# Patient Record
Sex: MALE | Race: WHITE | NOT HISPANIC OR LATINO | Employment: OTHER | URBAN - METROPOLITAN AREA
[De-identification: names, ages, dates, MRNs, and addresses within clinical notes are randomized per-mention and may not be internally consistent; named-entity substitution may affect disease eponyms.]

---

## 2017-08-04 ENCOUNTER — HISTORICAL (OUTPATIENT)
Dept: RADIOLOGY | Facility: HOSPITAL | Age: 77
End: 2017-08-04

## 2017-08-04 LAB — PSA SERPL-MCNC: 5.32 NG/ML (ref 0–4)

## 2018-04-17 ENCOUNTER — HISTORICAL (OUTPATIENT)
Dept: LAB | Facility: HOSPITAL | Age: 78
End: 2018-04-17

## 2018-04-17 LAB
ABS NEUT (OLG): 2.9
ALBUMIN SERPL-MCNC: 3.5 GM/DL (ref 3.4–5)
ALBUMIN/GLOB SERPL: 1 RATIO (ref 1.1–2)
ALP SERPL-CCNC: 124 UNIT/L (ref 46–116)
ALT SERPL-CCNC: 29 UNIT/L (ref 12–78)
AST SERPL-CCNC: 45 UNIT/L (ref 10–37)
BASOPHILS # BLD AUTO: 0.03 X10(3)/MCL
BASOPHILS NFR BLD AUTO: 0.7 %
BILIRUB SERPL-MCNC: 0.6 MG/DL (ref 0.2–1)
BILIRUBIN DIRECT+TOT PNL SERPL-MCNC: 0.15 MG/DL (ref 0–0.2)
BILIRUBIN DIRECT+TOT PNL SERPL-MCNC: 0.45 MG/DL
BUN SERPL-MCNC: 23 MG/DL (ref 7–18)
CALCIUM SERPL-MCNC: 9.4 MG/DL (ref 8.5–10.1)
CHLORIDE SERPL-SCNC: 102 MMOL/L (ref 98–107)
CHOLEST SERPL-MCNC: 183 MG/DL (ref 50–200)
CHOLEST/HDLC SERPL: 2 {RATIO} (ref 0–5)
CO2 SERPL-SCNC: 27.5 MMOL/L (ref 21–32)
CREAT SERPL-MCNC: 0.98 MG/DL (ref 0.7–1.3)
EOSINOPHIL # BLD AUTO: 0.16 X10(3)/MCL
EOSINOPHIL NFR BLD AUTO: 3.5 %
ERYTHROCYTE [DISTWIDTH] IN BLOOD BY AUTOMATED COUNT: 13 %
GLOBULIN SER-MCNC: 3.6 GM/DL (ref 2.4–3.5)
GLUCOSE SERPL-MCNC: 101 MG/DL (ref 74–106)
HCT VFR BLD AUTO: 36.6 % (ref 39–49)
HDLC SERPL-MCNC: 81 MG/DL (ref 35–60)
HGB BLD-MCNC: 11.9 GM/DL (ref 12.6–16.6)
IMM GRANULOCYTES # BLD AUTO: 0.01 10*3/UL (ref 0–0.1)
IMM GRANULOCYTES NFR BLD AUTO: 0.2 % (ref 0–1)
LDLC SERPL CALC-MCNC: 93 MG/DL (ref 50–140)
LYMPHOCYTES # BLD AUTO: 0.99 X10(3)/MCL
LYMPHOCYTES NFR BLD AUTO: 21.8 %
MCH RBC QN AUTO: 29.2 PG (ref 27–33)
MCHC RBC AUTO-ENTMCNC: 32.5 GM/DL (ref 32–35)
MCV RBC AUTO: 89.7 FL (ref 84–97)
MONOCYTES # BLD AUTO: 0.46 X10(3)/MCL
MONOCYTES NFR BLD AUTO: 10.1 %
NEUTROPHILS # BLD AUTO: 2.9 X10(3)/MCL
NEUTROPHILS NFR BLD AUTO: 63.7 %
PLATELET # BLD AUTO: 231 X10(3)/MCL (ref 151–368)
PMV BLD AUTO: 9 FL
POTASSIUM SERPL-SCNC: 4.2 MMOL/L (ref 3.5–5.1)
PROT SERPL-MCNC: 7.1 GM/DL (ref 6.4–8.2)
RBC # BLD AUTO: 4.08 X10(6)/MCL (ref 4.3–5.6)
SODIUM SERPL-SCNC: 138 MMOL/L (ref 136–145)
TRIGL SERPL-MCNC: 45 MG/DL (ref 30–150)
VLDLC SERPL CALC-MCNC: 9 MG/DL
WBC # SPEC AUTO: 4.55 X10(3)/MCL (ref 3.4–9.2)

## 2018-05-17 ENCOUNTER — HISTORICAL (OUTPATIENT)
Dept: RESPIRATORY THERAPY | Facility: HOSPITAL | Age: 78
End: 2018-05-17

## 2018-08-20 ENCOUNTER — HISTORICAL (OUTPATIENT)
Dept: LAB | Facility: HOSPITAL | Age: 78
End: 2018-08-20

## 2018-08-20 LAB
ALBUMIN SERPL-MCNC: 3.6 GM/DL (ref 3.4–5)
ALP SERPL-CCNC: 113 UNIT/L (ref 46–116)
ALT SERPL-CCNC: 29 UNIT/L (ref 12–78)
AST SERPL-CCNC: 36 UNIT/L (ref 10–37)
BILIRUB SERPL-MCNC: 0.6 MG/DL (ref 0.2–1)
BILIRUBIN DIRECT+TOT PNL SERPL-MCNC: 0.13 MG/DL (ref 0–0.2)
BILIRUBIN DIRECT+TOT PNL SERPL-MCNC: 0.47 MG/DL
PROT SERPL-MCNC: 6.9 GM/DL (ref 6.4–8.2)

## 2019-07-02 ENCOUNTER — HISTORICAL (OUTPATIENT)
Dept: LAB | Facility: HOSPITAL | Age: 79
End: 2019-07-02

## 2019-07-02 LAB
ALBUMIN SERPL-MCNC: 3.9 GM/DL (ref 3.4–5)
BUN SERPL-MCNC: 26 MG/DL (ref 7–18)
CALCIUM SERPL-MCNC: 9.5 MG/DL (ref 8.5–10.1)
CHLORIDE SERPL-SCNC: 104 MMOL/L (ref 98–107)
CO2 SERPL-SCNC: 28 MMOL/L (ref 21–32)
CREAT SERPL-MCNC: 0.92 MG/DL (ref 0.7–1.3)
GLUCOSE SERPL-MCNC: 89 MG/DL (ref 74–106)
PHOSPHATE SERPL-MCNC: 3.9 MG/DL (ref 2.5–4.9)
POTASSIUM SERPL-SCNC: 4.5 MMOL/L (ref 3.5–5.1)
SODIUM SERPL-SCNC: 138 MMOL/L (ref 136–145)

## 2021-04-23 ENCOUNTER — HISTORICAL (OUTPATIENT)
Dept: WOUND CARE | Facility: HOSPITAL | Age: 81
End: 2021-04-23

## 2021-06-01 ENCOUNTER — HISTORICAL (OUTPATIENT)
Dept: LAB | Facility: HOSPITAL | Age: 81
End: 2021-06-01

## 2021-06-01 LAB
ALBUMIN SERPL-MCNC: 3.4 GM/DL (ref 3.4–4.8)
ALBUMIN/GLOB SERPL: 0.9 RATIO (ref 1.1–2)
ALP SERPL-CCNC: 190 UNIT/L (ref 40–150)
ALT SERPL-CCNC: 17 UNIT/L (ref 0–55)
AST SERPL-CCNC: 24 UNIT/L (ref 5–34)
BILIRUB SERPL-MCNC: 0.5 MG/DL
BILIRUBIN DIRECT+TOT PNL SERPL-MCNC: 0.2 MG/DL
BILIRUBIN DIRECT+TOT PNL SERPL-MCNC: 0.3 MG/DL (ref 0–0.5)
BUN SERPL-MCNC: 17 MG/DL (ref 8.4–25.7)
CALCIUM SERPL-MCNC: 9.3 MG/DL (ref 8.8–10)
CHLORIDE SERPL-SCNC: 101 MMOL/L (ref 98–107)
CHOLEST SERPL-MCNC: 147 MG/DL
CHOLEST/HDLC SERPL: 2 {RATIO} (ref 0–5)
CO2 SERPL-SCNC: 28 MEQ/L (ref 23–31)
CREAT SERPL-MCNC: 0.83 MG/DL (ref 0.73–1.18)
GLOBULIN SER-MCNC: 3.6 GM/DL (ref 2.4–3.5)
GLUCOSE SERPL-MCNC: 101 MG/DL (ref 82–115)
HDLC SERPL-MCNC: 61 MG/DL (ref 35–60)
LDLC SERPL CALC-MCNC: 77 MG/DL (ref 50–140)
POTASSIUM SERPL-SCNC: 4.2 MMOL/L (ref 3.5–5.1)
PROT SERPL-MCNC: 7 GM/DL (ref 5.8–7.6)
SODIUM SERPL-SCNC: 137 MMOL/L (ref 136–145)
TRIGL SERPL-MCNC: 46 MG/DL (ref 34–140)
VLDLC SERPL CALC-MCNC: 9 MG/DL

## 2021-07-14 ENCOUNTER — HISTORICAL (OUTPATIENT)
Dept: LAB | Facility: HOSPITAL | Age: 81
End: 2021-07-14

## 2021-07-14 LAB
ALBUMIN SERPL-MCNC: 3.6 GM/DL (ref 3.4–4.8)
ALBUMIN/GLOB SERPL: 1 RATIO (ref 1.1–2)
ALP SERPL-CCNC: 157 UNIT/L (ref 40–150)
ALT SERPL-CCNC: 23 UNIT/L (ref 0–55)
AST SERPL-CCNC: 31 UNIT/L (ref 5–34)
BILIRUB SERPL-MCNC: 0.5 MG/DL
BILIRUBIN DIRECT+TOT PNL SERPL-MCNC: 0.2 MG/DL (ref 0–0.5)
BILIRUBIN DIRECT+TOT PNL SERPL-MCNC: 0.3 MG/DL
BUN SERPL-MCNC: 21 MG/DL (ref 8.4–25.7)
CALCIUM SERPL-MCNC: 9.6 MG/DL (ref 8.8–10)
CHLORIDE SERPL-SCNC: 99 MMOL/L (ref 98–107)
CHOLEST SERPL-MCNC: 185 MG/DL
CHOLEST/HDLC SERPL: 3 {RATIO} (ref 0–5)
CO2 SERPL-SCNC: 30 MEQ/L (ref 23–31)
CREAT SERPL-MCNC: 0.9 MG/DL (ref 0.73–1.18)
GLOBULIN SER-MCNC: 3.6 GM/DL (ref 2.4–3.5)
GLUCOSE SERPL-MCNC: 99 MG/DL (ref 82–115)
HDLC SERPL-MCNC: 67 MG/DL (ref 35–60)
LDLC SERPL CALC-MCNC: 108 MG/DL (ref 50–140)
POTASSIUM SERPL-SCNC: 5.2 MMOL/L (ref 3.5–5.1)
PROT SERPL-MCNC: 7.2 GM/DL (ref 5.8–7.6)
SODIUM SERPL-SCNC: 136 MMOL/L (ref 136–145)
T4 SERPL-MCNC: 9.42 UG/DL (ref 4.87–11.72)
TRIGL SERPL-MCNC: 52 MG/DL (ref 34–140)
TSH SERPL-ACNC: 6.77 UIU/ML (ref 0.35–4.94)
VLDLC SERPL CALC-MCNC: 10 MG/DL

## 2021-07-26 ENCOUNTER — HISTORICAL (OUTPATIENT)
Dept: LAB | Facility: HOSPITAL | Age: 81
End: 2021-07-26

## 2021-07-26 LAB — POTASSIUM SERPL-SCNC: 4.1 MMOL/L (ref 3.5–5.1)

## 2021-08-27 ENCOUNTER — HISTORICAL (OUTPATIENT)
Dept: LAB | Facility: HOSPITAL | Age: 81
End: 2021-08-27

## 2021-08-27 LAB
T4 SERPL-MCNC: 9.93 UG/DL (ref 4.87–11.72)
TSH SERPL-ACNC: 3.7 UIU/ML (ref 0.35–4.94)

## 2022-03-02 ENCOUNTER — HISTORICAL (OUTPATIENT)
Dept: LAB | Facility: HOSPITAL | Age: 82
End: 2022-03-02

## 2022-03-02 LAB
ALBUMIN SERPL-MCNC: 3.6 G/DL (ref 3.4–4.8)
ALBUMIN/GLOB SERPL: 1 {RATIO} (ref 1.1–2)
ALP SERPL-CCNC: 128 U/L (ref 40–150)
ALT SERPL-CCNC: 16 U/L (ref 0–55)
AST SERPL-CCNC: 29 U/L (ref 5–34)
BILIRUB SERPL-MCNC: 0.6 MG/DL
BILIRUBIN DIRECT+TOT PNL SERPL-MCNC: 0.3
BILIRUBIN DIRECT+TOT PNL SERPL-MCNC: 0.3 (ref 0–0.5)
BUN SERPL-MCNC: 19 MG/DL (ref 8.4–25.7)
CALCIUM SERPL-MCNC: 9.7 MG/DL (ref 8.7–10.5)
CHLORIDE SERPL-SCNC: 101 MMOL/L (ref 98–107)
CHOLEST SERPL-MCNC: 172 MG/DL
CHOLEST/HDLC SERPL: 3 {RATIO} (ref 0–5)
CO2 SERPL-SCNC: 29 MMOL/L (ref 23–31)
CREAT SERPL-MCNC: 0.82 MG/DL (ref 0.73–1.18)
GLOBULIN SER-MCNC: 3.7 G/DL (ref 2.4–3.5)
GLUCOSE SERPL-MCNC: 95 MG/DL (ref 82–115)
HDLC SERPL-MCNC: 65 MG/DL (ref 35–60)
HEMOLYSIS INTERF INDEX SERPL-ACNC: 3
ICTERIC INTERF INDEX SERPL-ACNC: 1
LDLC SERPL CALC-MCNC: 96 MG/DL (ref 50–140)
LIPEMIC INTERF INDEX SERPL-ACNC: -1
POTASSIUM SERPL-SCNC: 4.3 MMOL/L (ref 3.5–5.1)
PROT SERPL-MCNC: 7.3 G/DL (ref 5.8–7.6)
SODIUM SERPL-SCNC: 138 MMOL/L (ref 136–145)
T4 SERPL-MCNC: 11.1 UG/DL (ref 4.87–11.72)
TRIGL SERPL-MCNC: 55 MG/DL (ref 34–140)
TSH SERPL-ACNC: 3.09 M[IU]/L (ref 0.35–4.94)
VLDLC SERPL CALC-MCNC: 11 MG/DL

## 2022-07-06 ENCOUNTER — LAB VISIT (OUTPATIENT)
Dept: LAB | Facility: HOSPITAL | Age: 82
End: 2022-07-06
Attending: FAMILY MEDICINE
Payer: MEDICARE

## 2022-07-06 DIAGNOSIS — E03.9 HYPOTHYROIDISM, ADULT: Primary | ICD-10-CM

## 2022-07-06 LAB — T4 FREE SERPL-MCNC: 1.09 NG/DL (ref 0.7–1.48)

## 2022-07-06 PROCEDURE — 36415 COLL VENOUS BLD VENIPUNCTURE: CPT

## 2022-07-06 PROCEDURE — 84439 ASSAY OF FREE THYROXINE: CPT

## 2022-10-04 ENCOUNTER — LAB VISIT (OUTPATIENT)
Dept: LAB | Facility: HOSPITAL | Age: 82
End: 2022-10-04
Attending: INTERNAL MEDICINE
Payer: MEDICARE

## 2022-10-04 DIAGNOSIS — E78.5 HYPERLIPIDEMIA, UNSPECIFIED HYPERLIPIDEMIA TYPE: ICD-10-CM

## 2022-10-04 DIAGNOSIS — I10 ESSENTIAL HYPERTENSION, MALIGNANT: ICD-10-CM

## 2022-10-04 DIAGNOSIS — I25.10 CORONARY ATHEROSCLEROSIS OF NATIVE CORONARY ARTERY: Primary | ICD-10-CM

## 2022-10-04 LAB
ALBUMIN SERPL-MCNC: 3.8 GM/DL (ref 3.4–4.8)
ALBUMIN/GLOB SERPL: 1 RATIO (ref 1.1–2)
ALP SERPL-CCNC: 121 UNIT/L (ref 40–150)
ALT SERPL-CCNC: 18 UNIT/L (ref 0–55)
AST SERPL-CCNC: 34 UNIT/L (ref 5–34)
BILIRUBIN DIRECT+TOT PNL SERPL-MCNC: 0.7 MG/DL
BUN SERPL-MCNC: 17 MG/DL (ref 8.4–25.7)
CALCIUM SERPL-MCNC: 10 MG/DL (ref 8.8–10)
CHLORIDE SERPL-SCNC: 103 MMOL/L (ref 98–107)
CHOLEST SERPL-MCNC: 162 MG/DL
CHOLEST/HDLC SERPL: 3 {RATIO} (ref 0–5)
CO2 SERPL-SCNC: 29 MMOL/L (ref 23–31)
CREAT SERPL-MCNC: 1.07 MG/DL (ref 0.73–1.18)
GFR SERPLBLD CREATININE-BSD FMLA CKD-EPI: >60 MLS/MIN/1.73/M2
GLOBULIN SER-MCNC: 3.8 GM/DL (ref 2.4–3.5)
GLUCOSE SERPL-MCNC: 105 MG/DL (ref 82–115)
HDLC SERPL-MCNC: 56 MG/DL (ref 35–60)
LDLC SERPL CALC-MCNC: 65 MG/DL (ref 50–140)
POTASSIUM SERPL-SCNC: 5 MMOL/L (ref 3.5–5.1)
PROT SERPL-MCNC: 7.6 GM/DL (ref 5.8–7.6)
SODIUM SERPL-SCNC: 141 MMOL/L (ref 136–145)
T4 SERPL-MCNC: 9.63 UG/DL (ref 4.87–11.72)
TRIGL SERPL-MCNC: 204 MG/DL (ref 34–140)
TSH SERPL-ACNC: 3.68 UIU/ML (ref 0.35–4.94)
VLDLC SERPL CALC-MCNC: 41 MG/DL

## 2022-10-04 PROCEDURE — 36415 COLL VENOUS BLD VENIPUNCTURE: CPT

## 2022-10-04 PROCEDURE — 80053 COMPREHEN METABOLIC PANEL: CPT

## 2022-10-04 PROCEDURE — 84436 ASSAY OF TOTAL THYROXINE: CPT

## 2022-10-04 PROCEDURE — 80061 LIPID PANEL: CPT

## 2022-10-04 PROCEDURE — 84443 ASSAY THYROID STIM HORMONE: CPT

## 2023-09-11 ENCOUNTER — LAB VISIT (OUTPATIENT)
Dept: LAB | Facility: HOSPITAL | Age: 83
End: 2023-09-11
Attending: FAMILY MEDICINE
Payer: MEDICARE

## 2023-09-11 DIAGNOSIS — R00.1 SEVERE SINUS BRADYCARDIA: ICD-10-CM

## 2023-09-11 DIAGNOSIS — R42 DIZZINESS AND GIDDINESS: Primary | ICD-10-CM

## 2023-09-11 DIAGNOSIS — Z00.00 ROUTINE GENERAL MEDICAL EXAMINATION AT A HEALTH CARE FACILITY: ICD-10-CM

## 2023-09-11 DIAGNOSIS — E78.5 HYPERLIPIDEMIA, UNSPECIFIED HYPERLIPIDEMIA TYPE: ICD-10-CM

## 2023-09-11 DIAGNOSIS — I25.10 CORONARY ATHEROSCLEROSIS OF NATIVE CORONARY ARTERY: ICD-10-CM

## 2023-09-11 LAB
ALBUMIN SERPL-MCNC: 3.6 G/DL (ref 3.4–4.8)
ALBUMIN/GLOB SERPL: 1 RATIO (ref 1.1–2)
ALP SERPL-CCNC: 122 UNIT/L (ref 40–150)
ALT SERPL-CCNC: 13 UNIT/L (ref 0–55)
APPEARANCE UR: CLEAR
AST SERPL-CCNC: 28 UNIT/L (ref 5–34)
BACTERIA #/AREA URNS AUTO: NORMAL /HPF
BASOPHILS # BLD AUTO: 0.03 X10(3)/MCL
BASOPHILS NFR BLD AUTO: 0.8 %
BILIRUB SERPL-MCNC: 0.7 MG/DL
BILIRUB UR QL STRIP.AUTO: NEGATIVE
BUN SERPL-MCNC: 15 MG/DL (ref 8.4–25.7)
CALCIUM SERPL-MCNC: 9.7 MG/DL (ref 8.8–10)
CHLORIDE SERPL-SCNC: 104 MMOL/L (ref 98–107)
CHOLEST SERPL-MCNC: 169 MG/DL
CHOLEST/HDLC SERPL: 3 {RATIO} (ref 0–5)
CO2 SERPL-SCNC: 24 MMOL/L (ref 23–31)
COLOR UR: YELLOW
CREAT SERPL-MCNC: 0.88 MG/DL (ref 0.73–1.18)
EOSINOPHIL # BLD AUTO: 0.08 X10(3)/MCL (ref 0–0.9)
EOSINOPHIL NFR BLD AUTO: 2.1 %
ERYTHROCYTE [DISTWIDTH] IN BLOOD BY AUTOMATED COUNT: 13.5 % (ref 11.5–17)
GFR SERPLBLD CREATININE-BSD FMLA CKD-EPI: >60 MLS/MIN/1.73/M2
GLOBULIN SER-MCNC: 3.5 GM/DL (ref 2.4–3.5)
GLUCOSE SERPL-MCNC: 109 MG/DL (ref 82–115)
GLUCOSE UR QL STRIP.AUTO: NEGATIVE
HCT VFR BLD AUTO: 39.7 % (ref 42–52)
HDLC SERPL-MCNC: 59 MG/DL (ref 35–60)
HGB BLD-MCNC: 12.8 G/DL (ref 14–18)
IMM GRANULOCYTES # BLD AUTO: 0 X10(3)/MCL (ref 0–0.04)
IMM GRANULOCYTES NFR BLD AUTO: 0 %
KETONES UR QL STRIP.AUTO: NEGATIVE
LDLC SERPL CALC-MCNC: 96 MG/DL (ref 50–140)
LEUKOCYTE ESTERASE UR QL STRIP.AUTO: NEGATIVE
LYMPHOCYTES # BLD AUTO: 1.07 X10(3)/MCL (ref 0.6–4.6)
LYMPHOCYTES NFR BLD AUTO: 27.6 %
MCH RBC QN AUTO: 29.3 PG (ref 27–31)
MCHC RBC AUTO-ENTMCNC: 32.2 G/DL (ref 33–36)
MCV RBC AUTO: 90.8 FL (ref 80–94)
MONOCYTES # BLD AUTO: 0.32 X10(3)/MCL (ref 0.1–1.3)
MONOCYTES NFR BLD AUTO: 8.2 %
NEUTROPHILS # BLD AUTO: 2.38 X10(3)/MCL (ref 2.1–9.2)
NEUTROPHILS NFR BLD AUTO: 61.3 %
NITRITE UR QL STRIP.AUTO: NEGATIVE
NRBC BLD AUTO-RTO: 0 %
PH UR STRIP.AUTO: 6.5 [PH]
PLATELET # BLD AUTO: 191 X10(3)/MCL (ref 130–400)
PMV BLD AUTO: 9.7 FL (ref 7.4–10.4)
POTASSIUM SERPL-SCNC: 4.3 MMOL/L (ref 3.5–5.1)
PROT SERPL-MCNC: 7.1 GM/DL (ref 5.8–7.6)
PROT UR QL STRIP.AUTO: NEGATIVE
RBC # BLD AUTO: 4.37 X10(6)/MCL (ref 4.7–6.1)
RBC #/AREA URNS AUTO: NORMAL /HPF
RBC UR QL AUTO: ABNORMAL
SODIUM SERPL-SCNC: 138 MMOL/L (ref 136–145)
SP GR UR STRIP.AUTO: 1.01 (ref 1–1.03)
SQUAMOUS #/AREA URNS AUTO: NORMAL /HPF
T4 FREE SERPL-MCNC: 1.16 NG/DL (ref 0.7–1.48)
TRIGL SERPL-MCNC: 72 MG/DL (ref 34–140)
TSH SERPL-ACNC: 3.15 UIU/ML (ref 0.35–4.94)
UROBILINOGEN UR STRIP-ACNC: 0.2
VLDLC SERPL CALC-MCNC: 14 MG/DL
WBC # SPEC AUTO: 3.88 X10(3)/MCL (ref 4.5–11.5)
WBC #/AREA URNS AUTO: NORMAL /HPF

## 2023-09-11 PROCEDURE — 81001 URINALYSIS AUTO W/SCOPE: CPT

## 2023-09-11 PROCEDURE — 84439 ASSAY OF FREE THYROXINE: CPT

## 2023-09-11 PROCEDURE — 36415 COLL VENOUS BLD VENIPUNCTURE: CPT

## 2023-09-11 PROCEDURE — 85025 COMPLETE CBC W/AUTO DIFF WBC: CPT

## 2023-09-11 PROCEDURE — 84443 ASSAY THYROID STIM HORMONE: CPT

## 2023-09-11 PROCEDURE — 80053 COMPREHEN METABOLIC PANEL: CPT

## 2023-09-11 PROCEDURE — 80061 LIPID PANEL: CPT

## 2024-03-12 ENCOUNTER — LAB VISIT (OUTPATIENT)
Dept: LAB | Facility: HOSPITAL | Age: 84
End: 2024-03-12
Attending: INTERNAL MEDICINE
Payer: MEDICARE

## 2024-03-12 DIAGNOSIS — I10 HYPERTENSION, UNSPECIFIED TYPE: ICD-10-CM

## 2024-03-12 DIAGNOSIS — I51.9 HEART DISEASE, UNSPECIFIED: Primary | ICD-10-CM

## 2024-03-12 LAB
ALBUMIN SERPL-MCNC: 3.8 G/DL (ref 3.4–4.8)
ALBUMIN/GLOB SERPL: 1.1 RATIO (ref 1.1–2)
ALP SERPL-CCNC: 127 UNIT/L (ref 40–150)
ALT SERPL-CCNC: 13 UNIT/L (ref 0–55)
AST SERPL-CCNC: 28 UNIT/L (ref 5–34)
BILIRUB SERPL-MCNC: 0.5 MG/DL
BUN SERPL-MCNC: 21 MG/DL (ref 8.4–25.7)
CALCIUM SERPL-MCNC: 9.6 MG/DL (ref 8.8–10)
CHLORIDE SERPL-SCNC: 100 MMOL/L (ref 98–107)
CHOLEST SERPL-MCNC: 136 MG/DL
CHOLEST/HDLC SERPL: 2 {RATIO} (ref 0–5)
CO2 SERPL-SCNC: 27 MMOL/L (ref 23–31)
CREAT SERPL-MCNC: 0.82 MG/DL (ref 0.73–1.18)
GFR SERPLBLD CREATININE-BSD FMLA CKD-EPI: >60 MLS/MIN/1.73/M2
GLOBULIN SER-MCNC: 3.6 GM/DL (ref 2.4–3.5)
GLUCOSE SERPL-MCNC: 105 MG/DL (ref 82–115)
HDLC SERPL-MCNC: 55 MG/DL (ref 35–60)
LDLC SERPL CALC-MCNC: 73 MG/DL (ref 50–140)
POTASSIUM SERPL-SCNC: 4.2 MMOL/L (ref 3.5–5.1)
PROT SERPL-MCNC: 7.4 GM/DL (ref 5.8–7.6)
SODIUM SERPL-SCNC: 136 MMOL/L (ref 136–145)
T4 SERPL-MCNC: 9.02 UG/DL (ref 4.87–11.72)
TRIGL SERPL-MCNC: 42 MG/DL (ref 34–140)
TSH SERPL-ACNC: 4.18 UIU/ML (ref 0.35–4.94)
VLDLC SERPL CALC-MCNC: 8 MG/DL

## 2024-03-12 PROCEDURE — 84436 ASSAY OF TOTAL THYROXINE: CPT

## 2024-03-12 PROCEDURE — 84443 ASSAY THYROID STIM HORMONE: CPT

## 2024-03-12 PROCEDURE — 80053 COMPREHEN METABOLIC PANEL: CPT

## 2024-03-12 PROCEDURE — 80061 LIPID PANEL: CPT

## 2024-03-12 PROCEDURE — 36415 COLL VENOUS BLD VENIPUNCTURE: CPT

## 2024-06-18 ENCOUNTER — LAB VISIT (OUTPATIENT)
Dept: LAB | Facility: HOSPITAL | Age: 84
End: 2024-06-18
Attending: INTERNAL MEDICINE
Payer: MEDICARE

## 2024-06-18 DIAGNOSIS — I51.9 HEART DISEASE, UNSPECIFIED: Primary | ICD-10-CM

## 2024-06-18 LAB
ANION GAP SERPL CALC-SCNC: 8 MEQ/L
BUN SERPL-MCNC: 22 MG/DL (ref 8.4–25.7)
CALCIUM SERPL-MCNC: 9.6 MG/DL (ref 8.8–10)
CHLORIDE SERPL-SCNC: 102 MMOL/L (ref 98–107)
CO2 SERPL-SCNC: 26 MMOL/L (ref 23–31)
CREAT SERPL-MCNC: 0.82 MG/DL (ref 0.73–1.18)
CREAT/UREA NIT SERPL: 27
GFR SERPLBLD CREATININE-BSD FMLA CKD-EPI: >60 ML/MIN/1.73/M2
GLUCOSE SERPL-MCNC: 104 MG/DL (ref 82–115)
POTASSIUM SERPL-SCNC: 4.5 MMOL/L (ref 3.5–5.1)
SODIUM SERPL-SCNC: 136 MMOL/L (ref 136–145)

## 2024-06-18 PROCEDURE — 36415 COLL VENOUS BLD VENIPUNCTURE: CPT

## 2024-06-18 PROCEDURE — 80048 BASIC METABOLIC PNL TOTAL CA: CPT

## 2024-07-10 ENCOUNTER — HOSPITAL ENCOUNTER (OUTPATIENT)
Dept: WOUND CARE | Facility: HOSPITAL | Age: 84
Discharge: HOME | End: 2024-07-10
Attending: NURSE PRACTITIONER
Payer: MEDICARE

## 2024-07-10 VITALS
SYSTOLIC BLOOD PRESSURE: 139 MMHG | BODY MASS INDEX: 33.49 KG/M2 | HEART RATE: 97 BPM | HEIGHT: 76 IN | DIASTOLIC BLOOD PRESSURE: 66 MMHG | WEIGHT: 275 LBS | RESPIRATION RATE: 18 BRPM

## 2024-07-10 DIAGNOSIS — I83.019 VENOUS STASIS ULCERS OF BOTH LOWER EXTREMITIES: Primary | ICD-10-CM

## 2024-07-10 DIAGNOSIS — L97.919 VENOUS STASIS ULCERS OF BOTH LOWER EXTREMITIES: Primary | ICD-10-CM

## 2024-07-10 DIAGNOSIS — L97.929 VENOUS STASIS ULCERS OF BOTH LOWER EXTREMITIES: Primary | ICD-10-CM

## 2024-07-10 DIAGNOSIS — I83.029 VENOUS STASIS ULCERS OF BOTH LOWER EXTREMITIES: Primary | ICD-10-CM

## 2024-07-10 PROCEDURE — 27000999 HC MEDICAL RECORD PHOTO DOCUMENTATION

## 2024-07-10 PROCEDURE — 99203 OFFICE O/P NEW LOW 30 MIN: CPT | Mod: 25

## 2024-07-10 PROCEDURE — 11719 TRIM NAIL(S) ANY NUMBER: CPT

## 2024-07-10 RX ORDER — ROSUVASTATIN CALCIUM 20 MG/1
20 TABLET, COATED ORAL NIGHTLY
COMMUNITY
Start: 2024-05-23

## 2024-07-10 RX ORDER — POTASSIUM CHLORIDE 20 MEQ/1
20 TABLET, EXTENDED RELEASE ORAL
COMMUNITY

## 2024-07-10 RX ORDER — RIVAROXABAN 20 MG/1
20 TABLET, FILM COATED ORAL
COMMUNITY
Start: 2024-06-18

## 2024-07-10 RX ORDER — MUPIROCIN 20 MG/G
OINTMENT TOPICAL
COMMUNITY
Start: 2024-04-16

## 2024-07-10 RX ORDER — CEFPROZIL 500 MG/1
TABLET, FILM COATED ORAL
COMMUNITY
Start: 2024-07-09

## 2024-07-10 RX ORDER — METOPROLOL SUCCINATE 25 MG/1
TABLET, EXTENDED RELEASE ORAL
COMMUNITY
Start: 2024-05-29

## 2024-07-10 RX ORDER — SILVER SULFADIAZINE 10 G/1000G
CREAM TOPICAL
COMMUNITY
Start: 2024-05-02

## 2024-07-10 RX ORDER — TAMSULOSIN HYDROCHLORIDE 0.4 MG/1
0.4 CAPSULE ORAL
COMMUNITY

## 2024-07-10 RX ORDER — FUROSEMIDE 40 MG/1
40 TABLET ORAL
COMMUNITY

## 2024-07-10 NOTE — PROGRESS NOTES
Home Health: None  Smoker   [] Yes   [x] No   Diabetic  [] Yes  [x] No   Vascular Surgeon:   Vascular procedures [] Yes [x] No   If so, when and what was done?  Recent Ultrasounds [x] Yes [] No   If so, when were they done? 6/2024 with Dr. Carter  Compression Pumps  [] Yes [x] No       Right ankle - 25.5 cm     Right calf - 45.5 cm     Left ankle - 26 cm     Left calf - 45.5 cm  Doppler done in office? [x] Yes [] No   Date: 7/10/24     Right dorsalis: monophasic     Right posterior: biphasic     Left dorsalis: biphasic     Left posterior: abnormal  Is the patient eligible for a graft, and/or currently grafting? [] Yes [] No    If so, which one/size?        Subjective:       Patient ID: Piter Farrell is a 84 y.o. male.    Chief Complaint: Venous Ulcer (Right lower leg/Left lower leg)    HPI    7/10/24 - Mr. Farrell is an 84 year old  male who presents with bilateral lower extremity venous ulcers.  His PCP is Dr. Jesus Hernandez, who referred him to wound clinic.    4/16/24: saw PCP for multiple sores on lower extremities, was given Augmentin 875 mg BID and Bumetanide (no culture was obtained).  4/25/24: saw PCP again for bacterial infection of lower extremities, refilled Augmentin 875 mg BID for 5 days (no culture was obtained)  5/2/24: saw PCP for left leg, was given Silvadine  5/28/24: saw PCP, suggested that he use compression stockings and follow up with his cardiologist (Dr. Carter), he did not do either of these things  7/9/24: saw PCP given Lasix, Cefzil and referred to wound care  He has a history of triple bypass and CHF.   He states that he had venous US done on his legs about 1 month ago with Dr. Carter.  These results were reviewed.  No arterial U/S were performed and the study shows that it was incomplete due to patient resistance.   He is not a diabetic.   He states that he was seen in our clinic about 3 years ago for the same issue.    Today his bilateral lower extremities have multiple open areas  that do appear venous in nature.  Because his venous ultrasounds were in conclusive an incomplete we will be ordering full cardiovascular ultrasounds.  He does have 2+ edema as well as hemosiderin staining.  We would like to compress, however, until we have arterial clearing we are hesitant to do so as this has not been addressed.  We did have a lengthy discussion today regarding his congestive heart failure, the need to compress, the impact that compression may have on his CHF due to moving the fluid from his legs to his chest, as well as the need to verify his arterial status prior to compressing.  He is in agreement with all of this and today we will begin covering the open ulcers and applying a Tubigrip over the Kerlix to hold the dressings in place.  He was instructed that he should alternate short elevation of the legs with dependent hanging of the legs so that blood flow can continue to the feet but at the same time tried to begin offloading some of the legs.  He is ambulatory and therefore would be able to wear an Unna boot wound we are at that point.  He does been the majority of his time at a local skilled nursing facility with his handicapped son.   All 10 toe nails today were quite lengthy and were trimmed for the patient.      Review of Systems      Objective:        Vitals:    07/10/24 1138   BP: 139/66   Pulse: 97   Resp: 18     Physical Exam         Wound 07/10/24 1154 Venous Ulcer Right anterior;lower Leg #1 (Active)   07/10/24 1154   Present on Original Admission: Y   Primary Wound Type: Venous ulcer   Side: Right   Orientation: anterior;lower   Location: Leg   Wound Approximate Age at First Assessment (Weeks):    Wound Number: #1   Is this injury device related?:    Incision Type:    Closure Method:    Wound Description (Comments):    Type:    Additional Comments:    Ankle-Brachial Index:    Pulses:    Removal Indication and Assessment:    Wound Outcome:    Dressing Appearance Moist drainage  07/10/24 1154   Drainage Amount Moderate 07/10/24 1154   Drainage Characteristics/Odor Serosanguineous 07/10/24 1154   Appearance Red;Blistered;Moist 07/10/24 1154   Tissue loss description Full thickness 07/10/24 1154   Periwound Area Edematous;Redness;Moist 07/10/24 1154   Wound Edges Callused 07/10/24 1154   Wound Length (cm) 11 cm 07/10/24 1154   Wound Width (cm) 3.5 cm 07/10/24 1154   Wound Depth (cm) 0.2 cm 07/10/24 1154   Wound Volume (cm^3) 7.7 cm^3 07/10/24 1154   Wound Surface Area (cm^2) 38.5 cm^2 07/10/24 1154   Care Cleansed with:;Wound cleanser 07/10/24 1154   Dressing Applied 07/10/24 1154            Wound 07/10/24 1154 Venous Ulcer Left lower Leg #2 (Active)   07/10/24 1154   Present on Original Admission: Y   Primary Wound Type: Venous ulcer   Side: Left   Orientation: lower   Location: Leg   Wound Approximate Age at First Assessment (Weeks):    Wound Number: #2   Is this injury device related?:    Incision Type:    Closure Method:    Wound Description (Comments):    Type:    Additional Comments:    Ankle-Brachial Index:    Pulses:    Removal Indication and Assessment:    Wound Outcome:    Dressing Appearance Moist drainage 07/10/24 1154   Drainage Amount Moderate 07/10/24 1154   Drainage Characteristics/Odor Serosanguineous 07/10/24 1154   Appearance Red;Moist 07/10/24 1154   Tissue loss description Full thickness 07/10/24 1154   Periwound Area Edematous;Redness;Moist 07/10/24 1154   Wound Edges Defined 07/10/24 1154   Wound Length (cm) 11.5 cm 07/10/24 1154   Wound Width (cm) 19 cm 07/10/24 1154   Wound Depth (cm) 0.2 cm 07/10/24 1154   Wound Volume (cm^3) 43.7 cm^3 07/10/24 1154   Wound Surface Area (cm^2) 218.5 cm^2 07/10/24 1154   Care Cleansed with:;Wound cleanser 07/10/24 1154   Dressing Applied 07/10/24 1154            Wound 07/10/24 1155 Venous Ulcer Right Toe, second #3 (Active)   07/10/24 1155   Present on Original Admission: Y   Primary Wound Type: Venous ulcer   Side: Right  "  Orientation:    Location: Toe, second   Wound Approximate Age at First Assessment (Weeks):    Wound Number: #3   Is this injury device related?:    Incision Type:    Closure Method:    Wound Description (Comments):    Type:    Additional Comments:    Ankle-Brachial Index:    Pulses:    Removal Indication and Assessment:    Wound Outcome:    Dressing Appearance Moist drainage 07/10/24 1154   Drainage Amount Moderate 07/10/24 1154   Drainage Characteristics/Odor Serosanguineous 07/10/24 1154   Appearance Red;Yellow;Fibrin;Moist 07/10/24 1154   Tissue loss description Full thickness 07/10/24 1154   Periwound Area Redness 07/10/24 1154   Wound Edges Defined 07/10/24 1154   Wound Length (cm) 1 cm 07/10/24 1154   Wound Width (cm) 0.8 cm 07/10/24 1154   Wound Depth (cm) 0.2 cm 07/10/24 1154   Wound Volume (cm^3) 0.16 cm^3 07/10/24 1154   Wound Surface Area (cm^2) 0.8 cm^2 07/10/24 1154   Care Cleansed with:;Wound cleanser 07/10/24 1154   Dressing Applied 07/10/24 1154     7/10/24       Right 2nd toe (pre)                                       Right lower anterior leg (pre)  (Silver alginate, 2" kerlix, tape)                   (silver alginate, kerlix, tubigirp)         Left lower anterior leg (pre)                          Left lower medial leg (pre)  (silver alginate, kerlix, tubigirp)          Assessment:           ICD-10-CM ICD-9-CM   1. Venous stasis ulcers of both lower extremities  I83.019 459.81    I83.029 707.10    L97.919     L97.929          Procedures:   Mechanical debridement only  Nail trimmings x 10    [] Yes [] No   I & D performed  [] Yes [] No   Excisional debridement performed  [] Yes [] No   Selective debridement performed           [x] Yes [] No   Mechanical debridement performed         [] Yes [] No  Silver nitrate applied                                     [] Yes [] No  Labs ordered this visit                                  [] Yes [] No   Imaging ordered this visit                           [] " "Yes [] No   Tissue pathology and/or culture taken           MEDICATIONS    Current Outpatient Medications:     cefPROZIL (CEFZIL) 500 MG tablet, , Disp: , Rfl:     LASIX 40 mg tablet, Take 40 mg by mouth., Disp: , Rfl:     metoprolol succinate (TOPROL-XL) 25 MG 24 hr tablet, Take by mouth., Disp: , Rfl:     potassium chloride SA (K-DUR,KLOR-CON) 20 MEQ tablet, Take 20 mEq by mouth., Disp: , Rfl:     rosuvastatin (CRESTOR) 20 MG tablet, Take 20 mg by mouth every evening., Disp: , Rfl:     SSD 1 % cream, APPLY TO SORES TWICE DAILY, Disp: , Rfl:     tamsulosin (FLOMAX) 0.4 mg Cap, Take 0.4 mg by mouth., Disp: , Rfl:     XARELTO 20 mg Tab, Take 20 mg by mouth., Disp: , Rfl:     mupirocin (BACTROBAN) 2 % ointment, APPLY TO SORE 3 TIMES A DAY (Patient not taking: Reported on 7/10/2024), Disp: , Rfl:  Review of patient's allergies indicates:  No Known Allergies    DIAGNOSTICS      Labs/Scans/Micro:    CBC:   Lab Results   Component Value Date    WBC 3.88 (L) 09/11/2023    HGB 12.8 (L) 09/11/2023    HCT 39.7 (L) 09/11/2023    MCV 90.8 09/11/2023     09/11/2023     Sedimentation rate: No results found for: "SEDRATE" C-reactive protein: No results found for: "CRP" Chemistry: [unfilled]  HBA1C: No components found for: "HBA1C"    Ankle Brachial Index: No results found for this or any previous visit.    Imaging:    Plain film: No results found for this or any previous visit.    MRI: No results found for this or any previous visit.    Bone Scan: No results found for this or any previous visit.    Vascular studies:      Microbiology: No results found for: "MICRO"    HOME HEALTH AGENCY:  none  TIMES PER WEEK/DAYS:    WOUND CARE ORDERS:  Bilateral lower extremities: cleanse with wound cleanser, apply silver alginate to open areas, cover with 4x4 gauze, wrap lightly with kerlix and secure with tape, wear tubigrip, change daily  Right 2nd toe: cleanse with wound cleanser, apply silver alginate to wound bed, wrap with 2" " kerlix, secure with tape, change daily    Follow up in 1 week (on 7/17/2024) for BLE.

## 2024-07-17 ENCOUNTER — HOSPITAL ENCOUNTER (OUTPATIENT)
Dept: WOUND CARE | Facility: HOSPITAL | Age: 84
Discharge: HOME | End: 2024-07-17
Attending: NURSE PRACTITIONER
Payer: MEDICARE

## 2024-07-17 VITALS
DIASTOLIC BLOOD PRESSURE: 78 MMHG | SYSTOLIC BLOOD PRESSURE: 136 MMHG | RESPIRATION RATE: 19 BRPM | WEIGHT: 274.94 LBS | HEIGHT: 76 IN | HEART RATE: 97 BPM | BODY MASS INDEX: 33.48 KG/M2

## 2024-07-17 DIAGNOSIS — L97.919 VENOUS STASIS ULCERS OF BOTH LOWER EXTREMITIES: Primary | ICD-10-CM

## 2024-07-17 DIAGNOSIS — L97.929 VENOUS STASIS ULCERS OF BOTH LOWER EXTREMITIES: Primary | ICD-10-CM

## 2024-07-17 DIAGNOSIS — I83.029 VENOUS STASIS ULCERS OF BOTH LOWER EXTREMITIES: Primary | ICD-10-CM

## 2024-07-17 DIAGNOSIS — I83.019 VENOUS STASIS ULCERS OF BOTH LOWER EXTREMITIES: Primary | ICD-10-CM

## 2024-07-17 PROCEDURE — 97597 DBRDMT OPN WND 1ST 20 CM/<: CPT

## 2024-07-17 PROCEDURE — 27000999 HC MEDICAL RECORD PHOTO DOCUMENTATION

## 2024-07-17 PROCEDURE — 99213 OFFICE O/P EST LOW 20 MIN: CPT | Mod: 25

## 2024-07-17 NOTE — PROGRESS NOTES
Home Health: None  Smoker   [] Yes   [x] No   Diabetic  [] Yes  [x] No   Vascular Surgeon:   Vascular procedures [] Yes [x] No   If so, when and what was done?  Recent Ultrasounds [x] Yes [] No   If so, when were they done? 6/2024 with Dr. Carter  Compression Pumps  [] Yes [x] No       Right ankle - 25.5 cm     Right calf - 45.5 cm     Left ankle - 26 cm     Left calf - 45.5 cm  Doppler done in office? [x] Yes [] No   Date: 7/10/24     Right dorsalis: monophasic     Right posterior: biphasic     Left dorsalis: biphasic     Left posterior: abnormal  Is the patient eligible for a graft, and/or currently grafting? [] Yes [] No    If so, which one/size?      Subjective:       Patient ID: Piter Farrell is a 84 y.o. male.    Chief Complaint: Venous Ulcer (Right lower leg/Left lower leg) and Wound Care (Right second toe )    HPI    7/10/24 - Mr. Farrell is an 84 year old  male who presents with bilateral lower extremity venous ulcers.  His PCP is Dr. Jesus Hernandez, who referred him to wound clinic.    4/16/24: saw PCP for multiple sores on lower extremities, was given Augmentin 875 mg BID and Bumetanide (no culture was obtained).  4/25/24: saw PCP again for bacterial infection of lower extremities, refilled Augmentin 875 mg BID for 5 days (no culture was obtained)  5/2/24: saw PCP for left leg, was given Silvadine  5/28/24: saw PCP, suggested that he use compression stockings and follow up with his cardiologist (Dr. Carter), he did not do either of these things  7/9/24: saw PCP given Lasix, Cefzil and referred to wound care  He has a history of triple bypass and CHF.   He states that he had venous US done on his legs about 1 month ago with Dr. Carter.  These results were reviewed.  No arterial U/S were performed and the study shows that it was incomplete due to patient resistance.   He is not a diabetic.   He states that he was seen in our clinic about 3 years ago for the same issue.    Today his bilateral lower  extremities have multiple open areas that do appear venous in nature.  Because his venous ultrasounds were in conclusive an incomplete we will be ordering full cardiovascular ultrasounds.  He does have 2+ edema as well as hemosiderin staining.  We would like to compress, however, until we have arterial clearing we are hesitant to do so as this has not been addressed.  We did have a lengthy discussion today regarding his congestive heart failure, the need to compress, the impact that compression may have on his CHF due to moving the fluid from his legs to his chest, as well as the need to verify his arterial status prior to compressing.  He is in agreement with all of this and today we will begin covering the open ulcers and applying a Tubigrip over the Kerlix to hold the dressings in place.  He was instructed that he should alternate short elevation of the legs with dependent hanging of the legs so that blood flow can continue to the feet but at the same time tried to begin offloading some of the legs.  He is ambulatory and therefore would be able to wear an Unna boot wound we are at that point.  He does been the majority of his time at a local skilled nursing facility with his handicapped son.   All 10 toe nails today were quite lengthy and were trimmed for the patient.    7/17/24 - Mr. Farrell is here today for followup on his bilateral lower extremity venous ulcers as well as a small wound to the right 2nd toe.  The right 2nd toe was assessed and remains stable.  The scattered wounds at the lower extremities were selectively debrided.  They are beginning to make progress and there are couple of areas were epithelial tissue is beginning to my great from the edges.  He has completed his p.o. Cefzil.  We are anxious to begin compressing the lower extremities, however, we do not have arterial clearing at this time.  He is scheduled for cardiovascular ultrasounds on 07/29/2024 at 10:00 a.m..  We do have venous  ultrasounds on file, however, we do not have arterial.  Once arterial ultrasounds or clear we will begin compressing using Unna boots.      Review of Systems      Objective:        Vitals:    07/17/24 1404   BP: 136/78   Pulse: 97   Resp: 19     Physical Exam         Wound 07/10/24 1154 Venous Ulcer Right anterior;lower Leg #1 (Active)   07/10/24 1154   Present on Original Admission: Y   Primary Wound Type: Venous ulcer   Side: Right   Orientation: anterior;lower   Location: Leg   Wound Approximate Age at First Assessment (Weeks):    Wound Number: #1   Is this injury device related?:    Incision Type:    Closure Method:    Wound Description (Comments):    Type:    Additional Comments:    Ankle-Brachial Index:    Pulses:    Removal Indication and Assessment:    Wound Outcome:    Dressing Appearance Moist drainage 07/17/24 1406   Drainage Amount Moderate 07/17/24 1406   Drainage Characteristics/Odor Serosanguineous 07/17/24 1406   Appearance Red;Moist;Granulating;Slough 07/17/24 1406   Tissue loss description Full thickness 07/17/24 1406   Periwound Area Edematous 07/17/24 1406   Wound Edges Open 07/17/24 1406   Wound Length (cm) 8.8 cm 07/17/24 1406   Wound Width (cm) 2.8 cm 07/17/24 1406   Wound Depth (cm) 0.2 cm 07/17/24 1406   Wound Volume (cm^3) 4.928 cm^3 07/17/24 1406   Wound Surface Area (cm^2) 24.64 cm^2 07/17/24 1406   Care Cleansed with:;Wound cleanser 07/17/24 1406   Dressing Applied;Other (comment) 07/17/24 1406   Dressing Change Due 07/18/24 07/17/24 1406            Wound 07/10/24 1154 Venous Ulcer Left lower Leg #2 (Active)   07/10/24 1154   Present on Original Admission: Y   Primary Wound Type: Venous ulcer   Side: Left   Orientation: lower   Location: Leg   Wound Approximate Age at First Assessment (Weeks):    Wound Number: #2   Is this injury device related?:    Incision Type:    Closure Method:    Wound Description (Comments):    Type:    Additional Comments:    Ankle-Brachial Index:    Pulses:     Removal Indication and Assessment:    Wound Outcome:    Dressing Appearance Moist drainage 07/17/24 1406   Drainage Amount Moderate 07/17/24 1406   Drainage Characteristics/Odor Serosanguineous 07/17/24 1406   Appearance Red;Slough;Granulating 07/17/24 1406   Periwound Area Edematous 07/17/24 1406   Wound Edges Open 07/17/24 1406   Wound Length (cm) 11.5 cm 07/17/24 1406   Wound Width (cm) 14 cm 07/17/24 1406   Wound Depth (cm) 0.2 cm 07/17/24 1406   Wound Volume (cm^3) 32.2 cm^3 07/17/24 1406   Wound Surface Area (cm^2) 161 cm^2 07/17/24 1406   Care Cleansed with:;Wound cleanser 07/17/24 1406   Dressing Applied;Other (comment) 07/17/24 1406   Dressing Change Due 07/18/24 07/17/24 1406            Wound 07/10/24 1155 Venous Ulcer Right Toe, second #3 (Active)   07/10/24 1155   Present on Original Admission: Y   Primary Wound Type: Venous ulcer   Side: Right   Orientation:    Location: Toe, second   Wound Approximate Age at First Assessment (Weeks):    Wound Number: #3   Is this injury device related?:    Incision Type:    Closure Method:    Wound Description (Comments):    Type:    Additional Comments:    Ankle-Brachial Index:    Pulses:    Removal Indication and Assessment:    Wound Outcome:    Dressing Appearance Moist drainage 07/17/24 1406   Drainage Amount Moderate 07/17/24 1406   Drainage Characteristics/Odor Serosanguineous 07/17/24 1406   Appearance Red 07/17/24 1406   Tissue loss description Full thickness 07/17/24 1406   Periwound Area Intact 07/17/24 1406   Wound Edges Open 07/17/24 1406   Wound Length (cm) 0.8 cm 07/17/24 1406   Wound Width (cm) 0.4 cm 07/17/24 1406   Wound Depth (cm) 0.2 cm 07/17/24 1406   Wound Volume (cm^3) 0.064 cm^3 07/17/24 1406   Wound Surface Area (cm^2) 0.32 cm^2 07/17/24 1406   Care Cleansed with:;Wound cleanser 07/17/24 1406   Dressing Applied;Other (comment) 07/17/24 1406   Dressing Change Due 07/18/24 07/17/24 1406     7/17/24      Left anterior lower leg      Post  "debridement  Silver alginate, kerlix, tape, tubigrip        Left medial lower leg      Post debridement  Silver alginate, kerlix, tape, tubigrip         Right anterior lower leg       Post debridement  Silver alginate, kerlix, tape, tubigrip       Right 2nd toe   Silver alginate, bandage  CT          Assessment:         ICD-10-CM ICD-9-CM   1. Venous stasis ulcers of both lower extremities  I83.019 459.81    I83.029 707.10    L97.919     L97.929          Procedures:     Debridement     Date/Time: 7/17/2024 1:42 PM     Performed by: Bonita Willis NP  Authorized by: Bonita Willis NP    Time out: Immediately prior to procedure a "time out" was called to verify the correct patient, procedure, equipment, support staff and site/side marked as required.     Consent Done?:  Yes (Verbal)     Preparation: Patient was prepped and draped with clean technique    Local anesthesia used?: No       Wound Details:    Location:  Left leg    Type of Debridement:  Non-excisional       Length (cm):  11.5       Area (sq cm):  161       Width (cm):  14       Percent Debrided (%):  10       Depth (cm):  0.2       Total Area Debrided (sq cm):  16.1    Depth of debridement:  Epidermis/Dermis    Devitalized tissue debrided:  Biofilm, Callus, Exudate, Fibrin and Slough    Instruments:  Forceps and Scissors  Bleeding:  None     2nd Wound Details:     Location:  Right leg    Type of Debridement:  Non-excisional       Length (cm):  8.8       Area (sq cm):  24.64       Width (cm):  2.8       Percent Debrided (%):  20       Depth (cm):  0.2       Total Area Debrided (sq cm):  4.93    Depth of debridement:  Epidermis/Dermis    Devitalized tissue debrided:  Biofilm, Callus, Exudate, Slough and Fibrin    Instruments:  Forceps and Scissors  Bleeding:  None  Patient tolerance:  Patient tolerated the procedure well with no immediate complications    [] Yes [] No   I & D performed  [] Yes [] No   Excisional debridement performed  [x] Yes [] No " "  Selective debridement performed           [] Yes [] No   Mechanical debridement performed         [] Yes [] No  Silver nitrate applied                                     [] Yes [] No  Labs ordered this visit                                  [] Yes [] No   Imaging ordered this visit                           [] Yes [] No   Tissue pathology and/or culture taken           MEDICATIONS    Current Outpatient Medications:     LASIX 40 mg tablet, Take 40 mg by mouth., Disp: , Rfl:     metoprolol succinate (TOPROL-XL) 25 MG 24 hr tablet, Take by mouth., Disp: , Rfl:     mupirocin (BACTROBAN) 2 % ointment, , Disp: , Rfl:     potassium chloride SA (K-DUR,KLOR-CON) 20 MEQ tablet, Take 20 mEq by mouth., Disp: , Rfl:     rosuvastatin (CRESTOR) 20 MG tablet, Take 20 mg by mouth every evening., Disp: , Rfl:     SSD 1 % cream, APPLY TO SORES TWICE DAILY, Disp: , Rfl:     tamsulosin (FLOMAX) 0.4 mg Cap, Take 0.4 mg by mouth., Disp: , Rfl:     XARELTO 20 mg Tab, Take 20 mg by mouth., Disp: , Rfl:  Review of patient's allergies indicates:  No Known Allergies    DIAGNOSTICS      Labs/Scans/Micro:    CBC:   Lab Results   Component Value Date    WBC 3.88 (L) 09/11/2023    HGB 12.8 (L) 09/11/2023    HCT 39.7 (L) 09/11/2023    MCV 90.8 09/11/2023     09/11/2023     Sedimentation rate: No results found for: "SEDRATE" C-reactive protein: No results found for: "CRP" Chemistry: [unfilled]  HBA1C: No components found for: "HBA1C"    Ankle Brachial Index: No results found for this or any previous visit.    Imaging:    Plain film: No results found for this or any previous visit.    MRI: No results found for this or any previous visit.    Bone Scan: No results found for this or any previous visit.    Vascular studies:      Microbiology: No results found for: "MICRO"    HOME HEALTH AGENCY:  none  TIMES PER WEEK/DAYS:    WOUND CARE ORDERS:  Bilateral lower extremities: cleanse with wound cleanser, apply silver alginate to open areas, " "cover with 4x4 gauze, wrap lightly with kerlix and secure with tape, wear tubigrip, change daily  Right 2nd toe: cleanse with wound cleanser, apply silver alginate to wound bed, wrap with 2" kerlix, secure with tape, change daily    Follow up in 1 week (on 7/24/2024) for BLE.       "

## 2024-07-17 NOTE — PROCEDURES
"Debridement    Date/Time: 7/17/2024 1:42 PM    Performed by: Bonita Willis NP  Authorized by: Bonita Willis NP    Time out: Immediately prior to procedure a "time out" was called to verify the correct patient, procedure, equipment, support staff and site/side marked as required.    Consent Done?:  Yes (Verbal)    Preparation: Patient was prepped and draped with clean technique    Local anesthesia used?: No      Wound Details:    Location:  Left leg    Type of Debridement:  Non-excisional       Length (cm):  11.5       Area (sq cm):  161       Width (cm):  14       Percent Debrided (%):  10       Depth (cm):  0.2       Total Area Debrided (sq cm):  16.1    Depth of debridement:  Epidermis/Dermis    Devitalized tissue debrided:  Biofilm, Callus, Exudate, Fibrin and Slough    Instruments:  Forceps and Scissors  Bleeding:  None    2nd Wound Details:     Location:  Right leg    Type of Debridement:  Non-excisional       Length (cm):  8.8       Area (sq cm):  24.64       Width (cm):  2.8       Percent Debrided (%):  20       Depth (cm):  0.2       Total Area Debrided (sq cm):  4.93    Depth of debridement:  Epidermis/Dermis    Devitalized tissue debrided:  Biofilm, Callus, Exudate, Slough and Fibrin    Instruments:  Forceps and Scissors  Bleeding:  None  Patient tolerance:  Patient tolerated the procedure well with no immediate complications    "

## 2024-07-24 ENCOUNTER — HOSPITAL ENCOUNTER (OUTPATIENT)
Dept: WOUND CARE | Facility: HOSPITAL | Age: 84
Discharge: HOME | End: 2024-07-24
Attending: NURSE PRACTITIONER
Payer: MEDICARE

## 2024-07-24 VITALS
HEIGHT: 76 IN | DIASTOLIC BLOOD PRESSURE: 73 MMHG | BODY MASS INDEX: 33.48 KG/M2 | WEIGHT: 274.94 LBS | SYSTOLIC BLOOD PRESSURE: 133 MMHG | RESPIRATION RATE: 18 BRPM | HEART RATE: 101 BPM

## 2024-07-24 DIAGNOSIS — I83.029 VENOUS STASIS ULCERS OF BOTH LOWER EXTREMITIES: Primary | ICD-10-CM

## 2024-07-24 DIAGNOSIS — I83.019 VENOUS STASIS ULCERS OF BOTH LOWER EXTREMITIES: Primary | ICD-10-CM

## 2024-07-24 DIAGNOSIS — L97.919 VENOUS STASIS ULCERS OF BOTH LOWER EXTREMITIES: Primary | ICD-10-CM

## 2024-07-24 DIAGNOSIS — L97.929 VENOUS STASIS ULCERS OF BOTH LOWER EXTREMITIES: Primary | ICD-10-CM

## 2024-07-24 PROCEDURE — 99213 OFFICE O/P EST LOW 20 MIN: CPT | Mod: 25

## 2024-07-24 PROCEDURE — 97597 DBRDMT OPN WND 1ST 20 CM/<: CPT

## 2024-07-24 PROCEDURE — 27000999 HC MEDICAL RECORD PHOTO DOCUMENTATION

## 2024-07-24 NOTE — PROGRESS NOTES
Home Health: None  Smoker   [] Yes   [x] No   Diabetic  [] Yes  [x] No   Vascular Surgeon: Dr. Carter  Vascular procedures [] Yes [x] No   If so, when and what was done?  Recent Ultrasounds [x] Yes [] No   If so, when were they done? (7/29/24 - venous/arterial)  Compression Pumps  [] Yes [x] No       Right ankle - 25.3 cm     Right calf - 46 cm     Left ankle - 25.5 cm     Left calf - 47 cm  Doppler done in office? [x] Yes [] No   Date: 7/10/24     Right dorsalis: monophasic     Right posterior: biphasic     Left dorsalis: biphasic     Left posterior: abnormal  Is the patient eligible for a graft, and/or currently grafting? [] Yes [] No    If so, which one/size?      Subjective:       Patient ID: Piter Farrell is a 84 y.o. male.    Chief Complaint: Venous Ulcer (Venous Ulcer (Right lower leg/Left lower leg)  /) and Wound Care ((Right second toe )/ /)    HPI    7/10/24 - Mr. Farrell is an 84 year old  male who presents with bilateral lower extremity venous ulcers.  His PCP is Dr. Jesus Hernandez, who referred him to wound clinic.    4/16/24: saw PCP for multiple sores on lower extremities, was given Augmentin 875 mg BID and Bumetanide (no culture was obtained).  4/25/24: saw PCP again for bacterial infection of lower extremities, refilled Augmentin 875 mg BID for 5 days (no culture was obtained)  5/2/24: saw PCP for left leg, was given Silvadine  5/28/24: saw PCP, suggested that he use compression stockings and follow up with his cardiologist (Dr. Carter), he did not do either of these things  7/9/24: saw PCP given Lasix, Cefzil and referred to wound care  He has a history of triple bypass and CHF.   He states that he had venous US done on his legs about 1 month ago with Dr. Carter.  These results were reviewed.  No arterial U/S were performed and the study shows that it was incomplete due to patient resistance.   He is not a diabetic.   He states that he was seen in our clinic about 3 years ago for the same  issue.    Today his bilateral lower extremities have multiple open areas that do appear venous in nature.  Because his venous ultrasounds were in conclusive an incomplete we will be ordering full cardiovascular ultrasounds.  He does have 2+ edema as well as hemosiderin staining.  We would like to compress, however, until we have arterial clearing we are hesitant to do so as this has not been addressed.  We did have a lengthy discussion today regarding his congestive heart failure, the need to compress, the impact that compression may have on his CHF due to moving the fluid from his legs to his chest, as well as the need to verify his arterial status prior to compressing.  He is in agreement with all of this and today we will begin covering the open ulcers and applying a Tubigrip over the Kerlix to hold the dressings in place.  He was instructed that he should alternate short elevation of the legs with dependent hanging of the legs so that blood flow can continue to the feet but at the same time tried to begin offloading some of the legs.  He is ambulatory and therefore would be able to wear an Unna boot wound we are at that point.  He does been the majority of his time at a local skilled nursing facility with his handicapped son.   All 10 toe nails today were quite lengthy and were trimmed for the patient.    7/17/24 - Mr. Farrell is here today for followup on his bilateral lower extremity venous ulcers as well as a small wound to the right 2nd toe.  The right 2nd toe was assessed and remains stable.  The scattered wounds at the lower extremities were selectively debrided.  They are beginning to make progress and there are couple of areas were epithelial tissue is beginning to my great from the edges.  He has completed his p.o. Cefzil.  We are anxious to begin compressing the lower extremities, however, we do not have arterial clearing at this time.  He is scheduled for cardiovascular ultrasounds on 07/29/2024 at  10:00 a.m..  We do have venous ultrasounds on file, however, we do not have arterial.  Once arterial ultrasounds or clear we will begin compressing using Unna boots.    7/25/24 - The patient returns today for f/up on his bilateral lower extremity venous ulcers.  The wounds have all improved since his last visit on 7/17/24, however there is some slight erythema at the left lower extremity.  A culture was obtained of the left leg.  He is scheduled for bilateral venous/arterial US on 7/29/24. However, he is concerned about using compression and wound like to discuss it with his cardiologist, Dr. Carter.   Once the U/S are done, if there is any venous reflux noted these will be sent to Dr. Carter for evaluation and consultation with the patient prior to compression.       Review of Systems      Objective:        Vitals:    07/24/24 1151   BP: 133/73   Pulse: 101   Resp: 18     Physical Exam         Wound 07/10/24 1154 Venous Ulcer Right anterior;lower Leg #1 (Active)   07/10/24 1154   Present on Original Admission: Y   Primary Wound Type: Venous ulcer   Side: Right   Orientation: anterior;lower   Location: Leg   Wound Approximate Age at First Assessment (Weeks):    Wound Number: #1   Is this injury device related?:    Incision Type:    Closure Method:    Wound Description (Comments):    Type:    Additional Comments:    Ankle-Brachial Index:    Pulses:    Removal Indication and Assessment:    Wound Outcome:    Dressing Appearance Moist drainage 07/24/24 1203   Drainage Amount Moderate 07/24/24 1203   Drainage Characteristics/Odor Serosanguineous 07/24/24 1203   Appearance Red;Yellow;Moist;Slough 07/24/24 1203   Tissue loss description Full thickness 07/24/24 1203   Periwound Area Dry;Edematous 07/24/24 1203   Wound Edges Open 07/24/24 1203   Wound Length (cm) 8.4 cm 07/24/24 1203   Wound Width (cm) 8.5 cm 07/24/24 1203   Wound Depth (cm) 0.2 cm 07/24/24 1203   Wound Volume (cm^3) 14.28 cm^3 07/24/24 1203   Wound Surface  Area (cm^2) 71.4 cm^2 07/24/24 1203   Care Cleansed with:;Wound cleanser 07/24/24 1203   Dressing Applied 07/24/24 1203   Dressing Change Due 07/25/24 07/24/24 1203            Wound 07/10/24 1154 Venous Ulcer Left lower Leg #2 (Active)   07/10/24 1154   Present on Original Admission: Y   Primary Wound Type: Venous ulcer   Side: Left   Orientation: lower   Location: Leg   Wound Approximate Age at First Assessment (Weeks):    Wound Number: #2   Is this injury device related?:    Incision Type:    Closure Method:    Wound Description (Comments):    Type:    Additional Comments:    Ankle-Brachial Index:    Pulses:    Removal Indication and Assessment:    Wound Outcome:    Dressing Appearance Moist drainage 07/24/24 1203   Drainage Amount Moderate 07/24/24 1203   Drainage Characteristics/Odor Serosanguineous 07/24/24 1203   Appearance Red;Yellow;Slough;Moist 07/24/24 1203   Tissue loss description Full thickness 07/24/24 1203   Periwound Area Dry;Edematous 07/24/24 1203   Wound Edges Open 07/24/24 1203   Wound Length (cm) 5.5 cm 07/24/24 1203   Wound Width (cm) 4.2 cm 07/24/24 1203   Wound Depth (cm) 0.2 cm 07/24/24 1203   Wound Volume (cm^3) 4.62 cm^3 07/24/24 1203   Wound Surface Area (cm^2) 23.1 cm^2 07/24/24 1203   Care Cleansed with:;Wound cleanser 07/24/24 1203   Dressing Applied 07/24/24 1203            Wound 07/10/24 1155 Venous Ulcer Right Toe, second #3 (Active)   07/10/24 1155   Present on Original Admission: Y   Primary Wound Type: Venous ulcer   Side: Right   Orientation:    Location: Toe, second   Wound Approximate Age at First Assessment (Weeks):    Wound Number: #3   Is this injury device related?:    Incision Type:    Closure Method:    Wound Description (Comments):    Type:    Additional Comments:    Ankle-Brachial Index:    Pulses:    Removal Indication and Assessment:    Wound Outcome:    Dressing Appearance Open to air 07/24/24 1203   Drainage Amount None 07/24/24 1203   Appearance Maroon;Dry  "07/24/24 1203   Tissue loss description Full thickness 07/24/24 1203   Periwound Area Dry 07/24/24 1203   Wound Edges Defined 07/24/24 1203   Wound Length (cm) 0.5 cm 07/24/24 1203   Wound Width (cm) 0.3 cm 07/24/24 1203   Wound Depth (cm) 0.2 cm 07/24/24 1203   Wound Volume (cm^3) 0.03 cm^3 07/24/24 1203   Wound Surface Area (cm^2) 0.15 cm^2 07/24/24 1203   Care Cleansed with:;Wound cleanser 07/24/24 1203   Dressing Applied 07/24/24 1203   Dressing Change Due 07/25/24 07/24/24 1203     7/17/24      Left anterior lower leg      Post debridement  Silver alginate, kerlix, tape, tubigrip        Left medial lower leg      Post debridement  Silver alginate, kerlix, tape, tubigrip         Right anterior lower leg       Post debridement  Silver alginate, kerlix, tape, tubigrip       Right 2nd toe   Silver alginate, bandage  CT    7/24/24            Right lower leg (pre)                                          Left lower leg (pre)                                            Right 2nd toe (pre)  (mesalt, calcium alginate, 4x4, kerlix, tape, tubigrip)          Left lower leg (post)                                             Right 2nd toe (post)  (mesalt, calcium alginate, 4x4, kerlix, tape, tubigrip)          Assessment:         ICD-10-CM ICD-9-CM   1. Venous stasis ulcers of both lower extremities  I83.019 459.81    I83.029 707.10    L97.919     L97.929            Procedures:     Debridement     Date/Time: 7/24/2024 11:15 AM     Performed by: Bonita Willis NP  Authorized by: Bonita Willis NP    Time out: Immediately prior to procedure a "time out" was called to verify the correct patient, procedure, equipment, support staff and site/side marked as required.     Consent Done?:  Yes (Verbal)     Preparation: Patient was prepped and draped with clean technique    Local anesthesia used?: No       Wound Details:    Location:  Left leg    Type of Debridement:  Non-excisional       Length (cm):  5.5       Area (sq cm):  " "23.1       Width (cm):  4.2       Percent Debrided (%):  10       Depth (cm):  0.2       Total Area Debrided (sq cm):  2.31    Depth of debridement:  Epidermis/Dermis    Devitalized tissue debrided:  Biofilm, Callus, Exudate and Fibrin    Instruments:  Curette (Dermal)  Bleeding:  None  Patient tolerance:  Patient tolerated the procedure well with no immediate complications       [] Yes [] No   I & D performed  [] Yes [] No   Excisional debridement performed  [x] Yes [] No   Selective debridement performed           [] Yes [] No   Mechanical debridement performed         [] Yes [] No  Silver nitrate applied                                     [] Yes [] No  Labs ordered this visit                                  [] Yes [] No   Imaging ordered this visit                           [x] Yes [] No   Tissue pathology and/or culture taken  Culture obtained left leg         MEDICATIONS    Current Outpatient Medications:     LASIX 40 mg tablet, Take 40 mg by mouth., Disp: , Rfl:     metoprolol succinate (TOPROL-XL) 25 MG 24 hr tablet, Take by mouth., Disp: , Rfl:     potassium chloride SA (K-DUR,KLOR-CON) 20 MEQ tablet, Take 20 mEq by mouth., Disp: , Rfl:     rosuvastatin (CRESTOR) 20 MG tablet, Take 20 mg by mouth every evening., Disp: , Rfl:     tamsulosin (FLOMAX) 0.4 mg Cap, Take 0.4 mg by mouth., Disp: , Rfl:     XARELTO 20 mg Tab, Take 20 mg by mouth., Disp: , Rfl:     mupirocin (BACTROBAN) 2 % ointment, , Disp: , Rfl:     SSD 1 % cream, APPLY TO SORES TWICE DAILY (Patient not taking: Reported on 7/24/2024), Disp: , Rfl:  Review of patient's allergies indicates:  No Known Allergies    DIAGNOSTICS      Labs/Scans/Micro:    CBC:   Lab Results   Component Value Date    WBC 3.88 (L) 09/11/2023    HGB 12.8 (L) 09/11/2023    HCT 39.7 (L) 09/11/2023    MCV 90.8 09/11/2023     09/11/2023     Sedimentation rate: No results found for: "SEDRATE" C-reactive protein: No results found for: "CRP" Chemistry: " "[unfilled]  HBA1C: No components found for: "HBA1C"    Ankle Brachial Index: No results found for this or any previous visit.    Imaging:    Plain film: No results found for this or any previous visit.    MRI: No results found for this or any previous visit.    Bone Scan: No results found for this or any previous visit.    Vascular studies:      Microbiology: No results found for: "MICRO"    HOME HEALTH AGENCY:  none  TIMES PER WEEK/DAYS:    WOUND CARE ORDERS:  Bilateral lower extremities: cleanse with wound cleanser, apply mesalt to open wounds, cover with calcium alginate, 4x4 gauze, wrap lightly with kerlix and secure with tape, wear tubigrip, change daily  Right 2nd toe: cleanse with wound cleanser, apply bandaid, change daily    Follow up in 1 week (on 7/31/2024) for BLE/stretcher.       "

## 2024-07-25 NOTE — PROCEDURES
"Debridement    Date/Time: 7/24/2024 11:15 AM    Performed by: Bonita Willis NP  Authorized by: Bonita Willis NP    Time out: Immediately prior to procedure a "time out" was called to verify the correct patient, procedure, equipment, support staff and site/side marked as required.    Consent Done?:  Yes (Verbal)    Preparation: Patient was prepped and draped with clean technique    Local anesthesia used?: No      Wound Details:    Location:  Left leg    Type of Debridement:  Non-excisional       Length (cm):  5.5       Area (sq cm):  23.1       Width (cm):  4.2       Percent Debrided (%):  10       Depth (cm):  0.2       Total Area Debrided (sq cm):  2.31    Depth of debridement:  Epidermis/Dermis    Devitalized tissue debrided:  Biofilm, Callus, Exudate and Fibrin    Instruments:  Curette (Dermal)  Bleeding:  None  Patient tolerance:  Patient tolerated the procedure well with no immediate complications    "

## 2024-07-29 ENCOUNTER — HOSPITAL ENCOUNTER (OUTPATIENT)
Dept: RADIOLOGY | Facility: HOSPITAL | Age: 84
Discharge: HOME | End: 2024-07-29
Attending: NURSE PRACTITIONER
Payer: MEDICARE

## 2024-07-29 DIAGNOSIS — L97.821 SKIN ULCER OF KNEE, LEFT, LIMITED TO BREAKDOWN OF SKIN: ICD-10-CM

## 2024-07-29 DIAGNOSIS — R60.0 LOCALIZED EDEMA: ICD-10-CM

## 2024-07-29 PROCEDURE — 93922 UPR/L XTREMITY ART 2 LEVELS: CPT | Mod: TC,52

## 2024-07-29 PROCEDURE — 93926 LOWER EXTREMITY STUDY: CPT | Mod: TC,LT

## 2024-07-29 PROCEDURE — 93970 EXTREMITY STUDY: CPT | Mod: TC

## 2024-07-31 ENCOUNTER — HOSPITAL ENCOUNTER (OUTPATIENT)
Dept: WOUND CARE | Facility: HOSPITAL | Age: 84
Discharge: HOME | End: 2024-07-31
Attending: NURSE PRACTITIONER
Payer: MEDICARE

## 2024-07-31 VITALS
WEIGHT: 274.94 LBS | DIASTOLIC BLOOD PRESSURE: 78 MMHG | HEART RATE: 112 BPM | HEIGHT: 76 IN | BODY MASS INDEX: 33.48 KG/M2 | RESPIRATION RATE: 18 BRPM | SYSTOLIC BLOOD PRESSURE: 145 MMHG

## 2024-07-31 DIAGNOSIS — L97.919 VENOUS STASIS ULCERS OF BOTH LOWER EXTREMITIES: Primary | ICD-10-CM

## 2024-07-31 DIAGNOSIS — I83.019 VENOUS STASIS ULCERS OF BOTH LOWER EXTREMITIES: Primary | ICD-10-CM

## 2024-07-31 DIAGNOSIS — L97.929 VENOUS STASIS ULCERS OF BOTH LOWER EXTREMITIES: Primary | ICD-10-CM

## 2024-07-31 DIAGNOSIS — I83.029 VENOUS STASIS ULCERS OF BOTH LOWER EXTREMITIES: Primary | ICD-10-CM

## 2024-07-31 PROCEDURE — 99213 OFFICE O/P EST LOW 20 MIN: CPT

## 2024-07-31 PROCEDURE — 97597 DBRDMT OPN WND 1ST 20 CM/<: CPT

## 2024-07-31 PROCEDURE — 27000999 HC MEDICAL RECORD PHOTO DOCUMENTATION

## 2024-07-31 RX ORDER — DOXYCYCLINE HYCLATE 100 MG
100 TABLET ORAL 2 TIMES DAILY
Qty: 14 TABLET | Refills: 0 | Status: SHIPPED | OUTPATIENT
Start: 2024-07-31 | End: 2024-08-07

## 2024-07-31 RX ORDER — COLLAGENASE SANTYL 250 [ARB'U]/G
OINTMENT TOPICAL DAILY
Qty: 90 G | Refills: 2 | Status: SHIPPED | OUTPATIENT
Start: 2024-07-31 | End: 2024-08-30

## 2024-07-31 RX ORDER — SULFAMETHOXAZOLE AND TRIMETHOPRIM 800; 160 MG/1; MG/1
1 TABLET ORAL 2 TIMES DAILY
Qty: 14 TABLET | Refills: 0 | Status: SHIPPED | OUTPATIENT
Start: 2024-07-31 | End: 2024-08-07

## 2024-07-31 NOTE — PROCEDURES
"Debridement    Date/Time: 7/31/2024 8:57 AM    Performed by: Bonita Willis NP  Authorized by: Bonita Willis NP    Time out: Immediately prior to procedure a "time out" was called to verify the correct patient, procedure, equipment, support staff and site/side marked as required.    Consent Done?:  Yes (Verbal)    Preparation: Patient was prepped and draped with clean technique    Local anesthesia used?: No      Wound Details:    Location:  Left leg (Medial)    Type of Debridement:  Non-excisional       Length (cm):  4.3       Area (sq cm):  17.2       Width (cm):  4       Percent Debrided (%):  100       Depth (cm):  0.2       Total Area Debrided (sq cm):  17.2    Depth of debridement:  Epidermis/Dermis    Devitalized tissue debrided:  Biofilm, Callus, Exudate, Fibrin and Slough    Instruments:  Other and Forceps ([place])  Bleeding:  None    2nd Wound Details:     Location:  Left leg (Lateral)    Type of Debridement:  Non-excisional       Length (cm):  12.2       Area (sq cm):  73.2       Width (cm):  6       Percent Debrided (%):  25       Depth (cm):  0.2       Total Area Debrided (sq cm):  18.3    Depth of debridement:  Epidermis/Dermis    Devitalized tissue debrided:  Biofilm, Callus, Exudate, Fibrin and Slough    Instruments:  Forceps and Scissors  Bleeding:  None  Patient tolerance:  Patient tolerated the procedure well with no immediate complications    "

## 2024-07-31 NOTE — PROGRESS NOTES
Home Health: None  Smoker   [] Yes   [x] No   Diabetic  [] Yes  [x] No   Vascular Surgeon: Dr. Carter  Vascular procedures [] Yes [x] No   If so, when and what was done?  Recent Ultrasounds [x] Yes [] No   If so, when were they done? (7/29/24 - venous/arterial)  Compression Pumps  [] Yes [x] No       Right ankle - 25 cm     Right calf - 46.5 cm     Left ankle - 26.3 cm     Left calf - 47 cm  Doppler done in office? [x] Yes [] No   Date: 7/10/24     Right dorsalis: monophasic     Right posterior: biphasic     Left dorsalis: biphasic     Left posterior: abnormal  Is the patient eligible for a graft, and/or currently grafting? [] Yes [] No    If so, which one/size?    Subjective:       Patient ID: Piter Farrell is a 84 y.o. male.    Chief Complaint: Venous Ulcer (Right lower anterior leg/Left lower medial leg /Left lower lateral leg) and Wound Care ((Right second toe - healed))    HPI    7/10/24 - Mr. Farrell is an 84 year old  male who presents with bilateral lower extremity venous ulcers.  His PCP is Dr. Jesus Hernandez, who referred him to wound clinic.    4/16/24: saw PCP for multiple sores on lower extremities, was given Augmentin 875 mg BID and Bumetanide (no culture was obtained).  4/25/24: saw PCP again for bacterial infection of lower extremities, refilled Augmentin 875 mg BID for 5 days (no culture was obtained)  5/2/24: saw PCP for left leg, was given Silvadine  5/28/24: saw PCP, suggested that he use compression stockings and follow up with his cardiologist (Dr. Carter), he did not do either of these things  7/9/24: saw PCP given Lasix, Cefzil and referred to wound care  He has a history of triple bypass and CHF.   He states that he had venous US done on his legs about 1 month ago with Dr. Carter.  These results were reviewed.  No arterial U/S were performed and the study shows that it was incomplete due to patient resistance.   He is not a diabetic.   He states that he was seen in our clinic about 3  years ago for the same issue.    Today his bilateral lower extremities have multiple open areas that do appear venous in nature.  Because his venous ultrasounds were in conclusive an incomplete we will be ordering full cardiovascular ultrasounds.  He does have 2+ edema as well as hemosiderin staining.  We would like to compress, however, until we have arterial clearing we are hesitant to do so as this has not been addressed.  We did have a lengthy discussion today regarding his congestive heart failure, the need to compress, the impact that compression may have on his CHF due to moving the fluid from his legs to his chest, as well as the need to verify his arterial status prior to compressing.  He is in agreement with all of this and today we will begin covering the open ulcers and applying a Tubigrip over the Kerlix to hold the dressings in place.  He was instructed that he should alternate short elevation of the legs with dependent hanging of the legs so that blood flow can continue to the feet but at the same time tried to begin offloading some of the legs.  He is ambulatory and therefore would be able to wear an Unna boot wound we are at that point.  He does been the majority of his time at a local skilled nursing facility with his handicapped son.   All 10 toe nails today were quite lengthy and were trimmed for the patient.    7/17/24 - Mr. Farrell is here today for followup on his bilateral lower extremity venous ulcers as well as a small wound to the right 2nd toe.  The right 2nd toe was assessed and remains stable.  The scattered wounds at the lower extremities were selectively debrided.  They are beginning to make progress and there are couple of areas were epithelial tissue is beginning to my great from the edges.  He has completed his p.o. Cefzil.  We are anxious to begin compressing the lower extremities, however, we do not have arterial clearing at this time.  He is scheduled for cardiovascular  ultrasounds on 07/29/2024 at 10:00 a.m..  We do have venous ultrasounds on file, however, we do not have arterial.  Once arterial ultrasounds or clear we will begin compressing using Unna boots.    7/25/24 - The patient returns today for f/up on his bilateral lower extremity venous ulcers.  The wounds have all improved since his last visit on 7/17/24, however there is some slight erythema at the left lower extremity.  A culture was obtained of the left leg.  He is scheduled for bilateral venous/arterial US on 7/29/24. However, he is concerned about using compression and wound like to discuss it with his cardiologist, Dr. Carter.   Once the U/S are done, if there is any venous reflux noted these will be sent to Dr. Carter for evaluation and consultation with the patient prior to compression.     7/31/24 - Mr. Farrell returns today for the bilateral lower extremity venous ulcers.  He did complete his cardiovascular U/S on 7/29/24.  Both his arterial and venous studies were normal.  He also had a wound culture done on 7/25/24 which shows 100% Staph aureus.  He was prescribed Doxycycline today x 10 days.  We will also be authorizing Dalvance infusion for him in treatment of Staph aureus.  We would like to begin compression with Unna Boots, however, the patient would first like to clear that with his cardiologist.  He does have an adequate EF.  He does have an appt with Dr. Carter (cardio) this afternoon at 2:30 to have a halter monitor placed due to his a-fib. He was given a copy of his vascular studies to review at that time and will discus compression. Today the wounds on the left leg were selectively debrided.  Santyl has been ordered and we discussed the cost.  If he is unable to obtain he will continue with Mesalt.       Review of Systems      Objective:        Vitals:    07/31/24 0906   BP: (!) 145/78   Pulse: (!) 112   Resp: 18     Physical Exam         Wound 07/10/24 1154 Venous Ulcer Right anterior;lower Leg #1  (Active)   07/10/24 1154   Present on Original Admission: Y   Primary Wound Type: Venous ulcer   Side: Right   Orientation: anterior;lower   Location: Leg   Wound Approximate Age at First Assessment (Weeks):    Wound Number: #1   Is this injury device related?:    Incision Type:    Closure Method:    Wound Description (Comments):    Type:    Additional Comments:    Ankle-Brachial Index:    Pulses:    Removal Indication and Assessment:    Wound Outcome:    Dressing Appearance Moist drainage 07/31/24 0915   Drainage Amount Moderate 07/31/24 0915   Drainage Characteristics/Odor Serosanguineous 07/31/24 0915   Appearance Red;Yellow;Slough;Moist 07/31/24 0915   Tissue loss description Full thickness 07/31/24 0915   Periwound Area Dry;Edematous;Redness 07/31/24 0915   Wound Edges Open 07/31/24 0915   Wound Length (cm) 9 cm 07/31/24 0915   Wound Width (cm) 10 cm 07/31/24 0915   Wound Depth (cm) 0.2 cm 07/31/24 0915   Wound Volume (cm^3) 18 cm^3 07/31/24 0915   Wound Surface Area (cm^2) 90 cm^2 07/31/24 0915   Care Cleansed with:;Wound cleanser 07/31/24 0915   Dressing Applied 07/31/24 0915   Dressing Change Due 08/01/24 07/31/24 0915            Wound 07/10/24 1154 Venous Ulcer Left lower;medial Leg #2 (Active)   07/10/24 1154   Present on Original Admission: Y   Primary Wound Type: Venous ulcer   Side: Left   Orientation: lower;medial   Location: Leg   Wound Approximate Age at First Assessment (Weeks):    Wound Number: #2   Is this injury device related?:    Incision Type:    Closure Method:    Wound Description (Comments):    Type:    Additional Comments:    Ankle-Brachial Index:    Pulses:    Removal Indication and Assessment:    Wound Outcome:    Dressing Appearance Moist drainage 07/31/24 0915   Drainage Amount Moderate 07/31/24 0915   Drainage Characteristics/Odor Serosanguineous 07/31/24 0915   Appearance Red;Tan;Yellow;Slough;Moist 07/31/24 0915   Tissue loss description Full thickness 07/31/24 0915   Periwound Area  Dry;Edematous;Redness 07/31/24 0915   Wound Edges Open 07/31/24 0915   Wound Length (cm) 4.3 cm 07/31/24 0915   Wound Width (cm) 4 cm 07/31/24 0915   Wound Depth (cm) 0.2 cm 07/31/24 0915   Wound Volume (cm^3) 3.44 cm^3 07/31/24 0915   Wound Surface Area (cm^2) 17.2 cm^2 07/31/24 0915   Care Cleansed with:;Wound cleanser 07/31/24 0915   Dressing Applied 07/31/24 0915   Dressing Change Due 08/01/24 07/31/24 0915            Wound 07/31/24 0917 Venous Ulcer Left lower;lateral Leg #3 (Active)   07/31/24 0917   Present on Original Admission: Y   Primary Wound Type: Venous ulcer   Side: Left   Orientation: lower;lateral   Location: Leg   Wound Approximate Age at First Assessment (Weeks):    Wound Number: #3   Is this injury device related?:    Incision Type:    Closure Method:    Wound Description (Comments):    Type:    Additional Comments:    Ankle-Brachial Index:    Pulses:    Removal Indication and Assessment:    Wound Outcome:    Dressing Appearance Moist drainage 07/31/24 0917   Drainage Amount Moderate 07/31/24 0917   Drainage Characteristics/Odor Serosanguineous 07/31/24 0917   Appearance Red;Yellow;Slough;Moist 07/31/24 0917   Tissue loss description Full thickness 07/31/24 0917   Periwound Area Dry;Redness;Edematous 07/31/24 0917   Wound Edges Open 07/31/24 0917   Wound Length (cm) 12.2 cm 07/31/24 0917   Wound Width (cm) 6 cm 07/31/24 0917   Wound Depth (cm) 0.2 cm 07/31/24 0917   Wound Volume (cm^3) 14.64 cm^3 07/31/24 0917   Wound Surface Area (cm^2) 73.2 cm^2 07/31/24 0917   Care Cleansed with:;Wound cleanser 07/31/24 0917   Dressing Applied 07/31/24 0917   Dressing Change Due 08/01/24 07/31/24 0917       [REMOVED]      Wound 07/10/24 1155 Venous Ulcer Right Toe, second #3 (Removed)   07/10/24 1155   Present on Original Admission: Y   Primary Wound Type: Venous ulcer   Side: Right   Orientation:    Location: Toe, second   Wound Approximate Age at First Assessment (Weeks):    Wound Number: #3   Is this injury  "device related?:    Incision Type:    Closure Method:    Wound Description (Comments):    Type:    Additional Comments:    Ankle-Brachial Index:    Pulses:    Removal Indication and Assessment:    Wound Outcome: Healed   Removed 07/31/24 0947   Dressing Appearance Open to air 07/31/24 0915   Drainage Amount None 07/31/24 0915   Appearance Pink;Intact;Dry 07/31/24 0915   Periwound Area Intact;Dry;Pink 07/31/24 0915   Wound Length (cm) 0 cm 07/31/24 0915   Wound Width (cm) 0 cm 07/31/24 0915   Wound Depth (cm) 0 cm 07/31/24 0915   Wound Volume (cm^3) 0 cm^3 07/31/24 0915   Wound Surface Area (cm^2) 0 cm^2 07/31/24 0915     7/24/24            Right lower leg (pre)                                          Left lower leg (pre)                                            Right 2nd toe (pre)  (mesalt, calcium alginate, 4x4, kerlix, tape, tubigrip)          Left lower leg (post)                                             Right 2nd toe (post)  (mesalt, calcium alginate, 4x4, kerlix, tape, tubigrip)    7/31/24             Left lower lateral leg (pre)                            Left lower medial leg (pre)                          Right lower anterior leg (pre)                      Right 2nd toe (pre,healed)          Left lower medial leg (post)                            Left lower lateral leg (post)  (Mesalt, calcium alginate, kerlix, tubigrip - bilateral lower extremities)          Assessment:         ICD-10-CM ICD-9-CM   1. Venous stasis ulcers of both lower extremities  I83.019 459.81    I83.029 707.10    L97.919     L97.929            Procedures:     Debridement     Date/Time: 7/31/2024 8:57 AM     Performed by: Bonita Willis NP  Authorized by: Bonita Willis NP    Time out: Immediately prior to procedure a "time out" was called to verify the correct patient, procedure, equipment, support staff and site/side marked as required.     Consent Done?:  Yes (Verbal)     Preparation: Patient was prepped and draped with " clean technique    Local anesthesia used?: No       Wound Details:    Location:  Left leg (Medial)    Type of Debridement:  Non-excisional       Length (cm):  4.3       Area (sq cm):  17.2       Width (cm):  4       Percent Debrided (%):  100       Depth (cm):  0.2       Total Area Debrided (sq cm):  17.2    Depth of debridement:  Epidermis/Dermis    Devitalized tissue debrided:  Biofilm, Callus, Exudate, Fibrin and Slough    Instruments:  Other and Forceps ([place]erson)  Bleeding:  None     2nd Wound Details:     Location:  Left leg (Lateral)    Type of Debridement:  Non-excisional       Length (cm):  12.2       Area (sq cm):  73.2       Width (cm):  6       Percent Debrided (%):  25       Depth (cm):  0.2       Total Area Debrided (sq cm):  18.3    Depth of debridement:  Epidermis/Dermis    Devitalized tissue debrided:  Biofilm, Callus, Exudate, Fibrin and Slough    Instruments:  Forceps and Scissors  Bleeding:  None  Patient tolerance:  Patient tolerated the procedure well with no immediate complications       [] Yes [] No   I & D performed  [] Yes [] No   Excisional debridement performed  [x] Yes [] No   Selective debridement performed           [] Yes [] No   Mechanical debridement performed         [] Yes [] No  Silver nitrate applied                                     [] Yes [] No  Labs ordered this visit                                  [] Yes [] No   Imaging ordered this visit                           [] Yes [] No   Tissue pathology and/or culture taken           MEDICATIONS    Current Outpatient Medications:     LASIX 40 mg tablet, Take 40 mg by mouth., Disp: , Rfl:     metoprolol succinate (TOPROL-XL) 25 MG 24 hr tablet, Take by mouth., Disp: , Rfl:     potassium chloride SA (K-DUR,KLOR-CON) 20 MEQ tablet, Take 20 mEq by mouth., Disp: , Rfl:     rosuvastatin (CRESTOR) 20 MG tablet, Take 20 mg by mouth every evening., Disp: , Rfl:     tamsulosin (FLOMAX) 0.4 mg Cap, Take 0.4 mg by mouth., Disp: , Rfl:      "XARELTO 20 mg Tab, Take 20 mg by mouth., Disp: , Rfl:     collagenase (SANTYL) ointment, Apply topically once daily. Apply nickel thick to wound bed, changing daily, Disp: 90 g, Rfl: 2    doxycycline (VIBRA-TABS) 100 MG tablet, Take 1 tablet (100 mg total) by mouth 2 (two) times daily. for 7 days, Disp: 14 tablet, Rfl: 0    mupirocin (BACTROBAN) 2 % ointment, , Disp: , Rfl:     SSD 1 % cream, APPLY TO SORES TWICE DAILY (Patient not taking: Reported on 7/31/2024), Disp: , Rfl:     sulfamethoxazole-trimethoprim 800-160mg (BACTRIM DS) 800-160 mg Tab, Take 1 tablet by mouth 2 (two) times daily. for 7 days, Disp: 14 tablet, Rfl: 0 Review of patient's allergies indicates:  No Known Allergies    DIAGNOSTICS      Labs/Scans/Micro:    CBC:   Lab Results   Component Value Date    WBC 3.88 (L) 09/11/2023    HGB 12.8 (L) 09/11/2023    HCT 39.7 (L) 09/11/2023    MCV 90.8 09/11/2023     09/11/2023     Sedimentation rate: No results found for: "SEDRATE"     C-reactive protein: No results found for: "CRP"     Chemistry: 3/12/24  Comprehensive Metabolic Panel       Component Ref Range & Units 4 mo ago  (3/12/24)   Sodium 136 - 145 mmol/L 136   Potassium 3.5 - 5.1 mmol/L 4.2   Chloride 98 - 107 mmol/L 100   CO2 23 - 31 mmol/L 27   Glucose 82 - 115 mg/dL 105   Blood Urea Nitrogen 8.4 - 25.7 mg/dL 21.0   Creatinine 0.73 - 1.18 mg/dL 0.82   Calcium 8.8 - 10.0 mg/dL 9.6   Protein Total 5.8 - 7.6 gm/dL 7.4   Albumin 3.4 - 4.8 g/dL 3.8   Globulin 2.4 - 3.5 gm/dL 3.6 High    Albumin/Globulin Ratio 1.1 - 2.0 ratio 1.1   Bilirubin Total <=1.5 mg/dL 0.5   ALP 40 - 150 unit/L 127   ALT 0 - 55 unit/L 13   AST 5 - 34 unit/L 28   eGFR mls/min/1.73/m2 >60   Resulting Agency  [facility]          HBA1C: No components found for: "HBA1C"    Ankle Brachial Index: 7/11/24 -   Right - 1.34  Left - 1.29    Imaging:    Plain film: No results found for this or any previous visit.    MRI: No results found for this or any previous visit.    Bone Scan: No " results found for this or any previous visit.    Vascular studies:    7/11/24 -   Arterial:  FINDINGS:  Ultrasound Doppler and color flow imaging were performed on the arteries of the left lower extremity.  A tri phasic flow wave pattern is evident throughout the visualized arteries of the left lower extremity no significant localized atherosclerotic plaque formation or focal stenosis noted to the visualized arteries of the left lower extremity.  The right ankle brachial index is 1.34 and the left is 1.29.  Impression:  1.  No significant localized atherosclerotic plaque formation or focal stenosis noted to the visualized arteries of theleft lower extremity.    Venous:  FINDINGS:  There is normal compression and flow noted in the  common femoral vein, superficial femoral vein, popliteal vein, and  posterior tibial veinbilaterally.  No evidence of deep venous thrombosis is identified. No significant venous reflux is identified.  The left greater saphenous vein has been surgically harvested.  Impression:  1. No deep venous thrombosis identified to the lower extremities bilaterally.    Microbiology:   7/24/24 - Staph Aureus - Doxy Rx sent    HOME HEALTH AGENCY:  none  TIMES PER WEEK/DAYS:    WOUND CARE ORDERS:  Bilateral lower extremities: cleanse with hibicleans for 3 days, after 3 days, use dial soap, apply mesalt to open wounds, cover with calcium alginate, 4x4 gauze, wrap lightly with kerlix and secure with tape, wear tubigrip, change daily  **If able to get Santyl, apply to left lower medial leg wound only, apply nickel thick, then cover with mesalt, calcium alginate, 4x4, kerlix and tubigrip**    Follow up in 1 week (on 8/7/2024) for BLE.

## 2024-08-07 ENCOUNTER — HOSPITAL ENCOUNTER (OUTPATIENT)
Dept: WOUND CARE | Facility: HOSPITAL | Age: 84
Discharge: HOME | End: 2024-08-07
Attending: FAMILY MEDICINE
Payer: MEDICARE

## 2024-08-07 VITALS
SYSTOLIC BLOOD PRESSURE: 126 MMHG | DIASTOLIC BLOOD PRESSURE: 65 MMHG | BODY MASS INDEX: 33.48 KG/M2 | HEIGHT: 76 IN | WEIGHT: 274.94 LBS | RESPIRATION RATE: 18 BRPM | HEART RATE: 107 BPM

## 2024-08-07 DIAGNOSIS — I83.029 VENOUS STASIS ULCERS OF BOTH LOWER EXTREMITIES: Primary | ICD-10-CM

## 2024-08-07 DIAGNOSIS — L97.929 VENOUS STASIS ULCERS OF BOTH LOWER EXTREMITIES: Primary | ICD-10-CM

## 2024-08-07 DIAGNOSIS — I83.019 VENOUS STASIS ULCERS OF BOTH LOWER EXTREMITIES: Primary | ICD-10-CM

## 2024-08-07 DIAGNOSIS — I87.2 CHRONIC VENOUS INSUFFICIENCY: ICD-10-CM

## 2024-08-07 DIAGNOSIS — L97.919 VENOUS STASIS ULCERS OF BOTH LOWER EXTREMITIES: Primary | ICD-10-CM

## 2024-08-07 PROCEDURE — 27000999 HC MEDICAL RECORD PHOTO DOCUMENTATION

## 2024-08-07 PROCEDURE — 29580 STRAPPING UNNA BOOT: CPT

## 2024-08-07 PROCEDURE — 99213 OFFICE O/P EST LOW 20 MIN: CPT | Mod: 25

## 2024-08-07 PROCEDURE — 97597 DBRDMT OPN WND 1ST 20 CM/<: CPT

## 2024-08-09 ENCOUNTER — HOSPITAL ENCOUNTER (OUTPATIENT)
Dept: WOUND CARE | Facility: HOSPITAL | Age: 84
Discharge: HOME | End: 2024-08-09
Attending: FAMILY MEDICINE
Payer: MEDICARE

## 2024-08-09 VITALS
WEIGHT: 274.94 LBS | DIASTOLIC BLOOD PRESSURE: 76 MMHG | RESPIRATION RATE: 18 BRPM | HEART RATE: 104 BPM | HEIGHT: 76 IN | BODY MASS INDEX: 33.48 KG/M2 | SYSTOLIC BLOOD PRESSURE: 125 MMHG

## 2024-08-09 DIAGNOSIS — L97.919 VENOUS STASIS ULCERS OF BOTH LOWER EXTREMITIES: Primary | ICD-10-CM

## 2024-08-09 DIAGNOSIS — I83.029 VENOUS STASIS ULCERS OF BOTH LOWER EXTREMITIES: Primary | ICD-10-CM

## 2024-08-09 DIAGNOSIS — I83.019 VENOUS STASIS ULCERS OF BOTH LOWER EXTREMITIES: Primary | ICD-10-CM

## 2024-08-09 DIAGNOSIS — L97.929 VENOUS STASIS ULCERS OF BOTH LOWER EXTREMITIES: Primary | ICD-10-CM

## 2024-08-09 PROCEDURE — 99211 OFF/OP EST MAY X REQ PHY/QHP: CPT | Mod: 25

## 2024-08-09 PROCEDURE — 27000999 HC MEDICAL RECORD PHOTO DOCUMENTATION

## 2024-08-09 PROCEDURE — 29580 STRAPPING UNNA BOOT: CPT

## 2024-08-13 ENCOUNTER — HOSPITAL ENCOUNTER (OUTPATIENT)
Dept: WOUND CARE | Facility: HOSPITAL | Age: 84
Discharge: HOME | End: 2024-08-13
Attending: FAMILY MEDICINE
Payer: MEDICARE

## 2024-08-13 DIAGNOSIS — L97.919 VENOUS STASIS ULCERS OF BOTH LOWER EXTREMITIES: ICD-10-CM

## 2024-08-13 DIAGNOSIS — L97.929 VENOUS STASIS ULCERS OF BOTH LOWER EXTREMITIES: ICD-10-CM

## 2024-08-13 DIAGNOSIS — I83.029 VENOUS STASIS ULCERS OF BOTH LOWER EXTREMITIES: ICD-10-CM

## 2024-08-13 DIAGNOSIS — I87.2 CHRONIC VENOUS INSUFFICIENCY: Primary | ICD-10-CM

## 2024-08-13 DIAGNOSIS — I83.019 VENOUS STASIS ULCERS OF BOTH LOWER EXTREMITIES: ICD-10-CM

## 2024-08-13 PROCEDURE — 97597 DBRDMT OPN WND 1ST 20 CM/<: CPT

## 2024-08-13 PROCEDURE — 99213 OFFICE O/P EST LOW 20 MIN: CPT | Mod: 25

## 2024-08-13 PROCEDURE — 27000999 HC MEDICAL RECORD PHOTO DOCUMENTATION

## 2024-08-13 PROCEDURE — 29580 STRAPPING UNNA BOOT: CPT

## 2024-08-13 NOTE — PROCEDURES
"Debridement    Date/Time: 8/13/2024 7:41 AM    Performed by: Luis Alfredo Ch MD  Authorized by: Luis Alfredo Ch MD    Time out: Immediately prior to procedure a "time out" was called to verify the correct patient, procedure, equipment, support staff and site/side marked as required.    Consent Done?:  Yes (Verbal)    Preparation: Patient was prepped and draped with aseptic technique    Local anesthesia used?: No      Wound Details:    Location:  Left leg (Lower medial)    Type of Debridement:  Non-excisional       Length (cm):  4.2       Area (sq cm):  13.44       Width (cm):  3.2       Percent Debrided (%):  30       Depth (cm):  0.2       Total Area Debrided (sq cm):  4.03    Depth of debridement:  Epidermis/Dermis    Tissue debrided:  Epidermis    Devitalized tissue debrided:  Biofilm, Exudate and Slough    Instruments:  Curette (freer)  Bleeding:  None  Patient tolerance:  Patient tolerated the procedure well with no immediate complications  1st Wound Pain Assessment: 1    "

## 2024-08-13 NOTE — PROGRESS NOTES
Home Health: None  Smoker   [] Yes   [x] No   Diabetic  [] Yes  [x] No   Vascular Surgeon: Dr. Carter  Vascular procedures [] Yes [x] No   If so, when and what was done?  Recent Ultrasounds [x] Yes [] No   If so, when were they done? (7/29/24 - venous/arterial)  Compression Pumps  [] Yes [x] No       Right ankle - 24.6 cm     Right calf - 41.3 cm     Left ankle - 26 cm     Left calf - 42.3 cm  Doppler done in office? [x] Yes [] No   Date: 7/10/24     Right dorsalis: monophasic     Right posterior: biphasic     Left dorsalis: biphasic     Left posterior: abnormal  Is the patient eligible for a graft, and/or currently grafting? [] Yes [] No    If so, which one/size?    NOTES:  States that Dr. Carter (cardiologist) increased his dosage of Metoprolol recently but he has not noticed a change in his heart rate, he will call Dr. Carter to make an appt to see about this. The left lateral leg is healed today    Subjective:       Patient ID: Piter Farrell is a 84 y.o. male.    Chief Complaint: Venous Ulcer ((Right lower anterior leg/Left lower medial leg /Left lower lateral leg)/ /)    HPI    7/10/24 - Mr. Farrell is an 84 year old  male who presents with bilateral lower extremity venous ulcers.  His PCP is Dr. Jesus Hernandez, who referred him to wound clinic.    4/16/24: saw PCP for multiple sores on lower extremities, was given Augmentin 875 mg BID and Bumetanide (no culture was obtained).  4/25/24: saw PCP again for bacterial infection of lower extremities, refilled Augmentin 875 mg BID for 5 days (no culture was obtained)  5/2/24: saw PCP for left leg, was given Silvadine  5/28/24: saw PCP, suggested that he use compression stockings and follow up with his cardiologist (Dr. Carter), he did not do either of these things  7/9/24: saw PCP given Lasix, Cefzil and referred to wound care  He has a history of triple bypass and CHF.   He states that he had venous US done on his legs about 1 month ago with Dr. Carter.  These  results were reviewed.  No arterial U/S were performed and the study shows that it was incomplete due to patient resistance.   He is not a diabetic.   He states that he was seen in our clinic about 3 years ago for the same issue.    Today his bilateral lower extremities have multiple open areas that do appear venous in nature.  Because his venous ultrasounds were in conclusive an incomplete we will be ordering full cardiovascular ultrasounds.  He does have 2+ edema as well as hemosiderin staining.  We would like to compress, however, until we have arterial clearing we are hesitant to do so as this has not been addressed.  We did have a lengthy discussion today regarding his congestive heart failure, the need to compress, the impact that compression may have on his CHF due to moving the fluid from his legs to his chest, as well as the need to verify his arterial status prior to compressing.  He is in agreement with all of this and today we will begin covering the open ulcers and applying a Tubigrip over the Kerlix to hold the dressings in place.  He was instructed that he should alternate short elevation of the legs with dependent hanging of the legs so that blood flow can continue to the feet but at the same time tried to begin offloading some of the legs.  He is ambulatory and therefore would be able to wear an Unna boot wound we are at that point.  He does been the majority of his time at a local skilled nursing facility with his handicapped son.   All 10 toe nails today were quite lengthy and were trimmed for the patient.    7/17/24 - Mr. Farrell is here today for followup on his bilateral lower extremity venous ulcers as well as a small wound to the right 2nd toe.  The right 2nd toe was assessed and remains stable.  The scattered wounds at the lower extremities were selectively debrided.  They are beginning to make progress and there are couple of areas were epithelial tissue is beginning to my great from the  edges.  He has completed his p.o. Cefzil.  We are anxious to begin compressing the lower extremities, however, we do not have arterial clearing at this time.  He is scheduled for cardiovascular ultrasounds on 07/29/2024 at 10:00 a.m..  We do have venous ultrasounds on file, however, we do not have arterial.  Once arterial ultrasounds or clear we will begin compressing using Unna boots.    7/25/24 - The patient returns today for f/up on his bilateral lower extremity venous ulcers.  The wounds have all improved since his last visit on 7/17/24, however there is some slight erythema at the left lower extremity.  A culture was obtained of the left leg.  He is scheduled for bilateral venous/arterial US on 7/29/24. However, he is concerned about using compression and wound like to discuss it with his cardiologist, Dr. Carter.   Once the U/S are done, if there is any venous reflux noted these will be sent to Dr. Carter for evaluation and consultation with the patient prior to compression.     7/31/24 - Mr. Farrell returns today for the bilateral lower extremity venous ulcers.  He did complete his cardiovascular U/S on 7/29/24.  Both his arterial and venous studies were normal.  He also had a wound culture done on 7/25/24 which shows 100% Staph aureus.  He was prescribed Doxycycline today x 10 days.  We will also be authorizing Dalvance infusion for him in treatment of Staph aureus.  We would like to begin compression with Unna Boots, however, the patient would first like to clear that with his cardiologist.  He does have an adequate EF.  He does have an appt with Dr. Carter (cardio) this afternoon at 2:30 to have a halter monitor placed due to his a-fib. He was given a copy of his vascular studies to review at that time and will discus compression. Today the wounds on the left leg were selectively debrided.  Santyl has been ordered and we discussed the cost.  If he is unable to obtain he will continue with Mesalt.     August  7, 2024:    84-year-old white male, she of edema in his legs, and some venous stasis ulcers.  The recent venous ultrasound showed good flow in all of his major veins.  There was no DVT.  His CHF appears to be well controlled.  He will an attempt at compression of both legs.  The venous stasis ulcers in his legs are somewhat stalled at this time.  There is a left lower medial venous stasis ulcer, with slough present.  It is slightly painful today.    August 9, 2024:  84-year-old white male, who is here for a nurse visit only, to have his dressings changed.  There are no new issues.    August 13, 2024:  84-year-old white male, who is here for follow up of his chronic venous insufficiency, left lower leg venous stasis ulcer.  These wounds are all improving.  The wound on his right lower leg has just about healed.  The edema is much improved with Unna boots.  He does have compression stockings at home, for future needs.    Review of Systems   Constitutional: Negative.    Respiratory: Negative.     Cardiovascular: Negative.    Skin:         As documented in the HPI.   All other systems reviewed and are negative.        Objective:        There were no vitals filed for this visit.    Physical Exam  Vitals and nursing note reviewed.   Constitutional:       Appearance: Normal appearance.   Cardiovascular:      Rate and Rhythm: Normal rate.   Pulmonary:      Effort: Pulmonary effort is normal.   Skin:     General: Skin is warm and dry.      Comments: There is only trace edema in the pretibial legs.  He has the stasis ulcer on the left medial leg, which I debrided with a curette, removing some biofilm, exudate, slough.  I was able to only debride 30% of the wound bed, secondary to pain.  There is a small right lower leg venous stasis ulcer.  This has just about completely healed.   Neurological:      Mental Status: He is alert.            Wound 07/10/24 1154 Venous Ulcer Right anterior;lower Leg #1 (Active)   07/10/24 1154    Present on Original Admission: Y   Primary Wound Type: Venous ulcer   Side: Right   Orientation: anterior;lower   Location: Leg   Wound Approximate Age at First Assessment (Weeks):    Wound Number: #1   Is this injury device related?:    Incision Type:    Closure Method:    Wound Description (Comments):    Type:    Additional Comments:    Ankle-Brachial Index:    Pulses:    Removal Indication and Assessment:    Wound Outcome:    Dressing Appearance Dried drainage 08/13/24 0808   Drainage Amount Small 08/13/24 0808   Drainage Characteristics/Odor Serosanguineous 08/13/24 0808   Appearance Pink;Moist 08/13/24 0808   Tissue loss description Full thickness 08/13/24 0808   Periwound Area Dry 08/13/24 0808   Wound Edges Defined 08/13/24 0808   Wound Length (cm) 0.2 cm 08/13/24 0808   Wound Width (cm) 0.2 cm 08/13/24 0808   Wound Depth (cm) 0.2 cm 08/13/24 0808   Wound Volume (cm^3) 0.008 cm^3 08/13/24 0808   Wound Surface Area (cm^2) 0.04 cm^2 08/13/24 0808   Care Cleansed with:;Wound cleanser 08/13/24 0808   Dressing Applied 08/13/24 0808   Dressing Change Due 08/16/24 08/13/24 0808            Wound 07/10/24 1154 Venous Ulcer Left lower;medial Leg #2 (Active)   07/10/24 1154   Present on Original Admission: Y   Primary Wound Type: Venous ulcer   Side: Left   Orientation: lower;medial   Location: Leg   Wound Approximate Age at First Assessment (Weeks):    Wound Number: #2   Is this injury device related?:    Incision Type:    Closure Method:    Wound Description (Comments):    Type:    Additional Comments:    Ankle-Brachial Index:    Pulses:    Removal Indication and Assessment:    Wound Outcome:    Dressing Appearance Moist drainage 08/13/24 0808   Drainage Amount Moderate 08/13/24 0808   Drainage Characteristics/Odor Serosanguineous 08/13/24 0808   Appearance Red;Yellow;Moist;Slough;Epithelialization 08/13/24 0808   Tissue loss description Full thickness 08/13/24 0808   Periwound Area Intact;Dry 08/13/24 0808   Wound  Edges Open 08/13/24 0808   Wound Length (cm) 4.2 cm 08/13/24 0808   Wound Width (cm) 3.2 cm 08/13/24 0808   Wound Depth (cm) 0.2 cm 08/13/24 0808   Wound Volume (cm^3) 2.688 cm^3 08/13/24 0808   Wound Surface Area (cm^2) 13.44 cm^2 08/13/24 0808   Care Cleansed with:;Wound cleanser 08/13/24 0808   Dressing Applied 08/13/24 0808   Dressing Change Due 08/16/24 08/13/24 0808       [REMOVED]      Wound 07/31/24 0917 Venous Ulcer Left lower;lateral Leg #3 (Removed)   07/31/24 0917   Present on Original Admission: Y   Primary Wound Type: Venous ulcer   Side: Left   Orientation: lower;lateral   Location: Leg   Wound Approximate Age at First Assessment (Weeks):    Wound Number: #3   Is this injury device related?:    Incision Type:    Closure Method:    Wound Description (Comments):    Type:    Additional Comments:    Ankle-Brachial Index:    Pulses:    Removal Indication and Assessment:    Wound Outcome: Healed   Removed 08/13/24 0809   Dressing Appearance Dry;Intact;Clean 08/13/24 0808   Drainage Amount None 08/13/24 0808   Appearance Pink;Intact;Dry 08/13/24 0808   Periwound Area Intact;Dry 08/13/24 0808   Wound Length (cm) 0 cm 08/13/24 0808   Wound Width (cm) 0 cm 08/13/24 0808   Wound Depth (cm) 0 cm 08/13/24 0808   Wound Volume (cm^3) 0 cm^3 08/13/24 0808   Wound Surface Area (cm^2) 0 cm^2 08/13/24 0808   08/09/24        Left medial lower leg                                  Left lateral lower leg                                   Right anterior lower leg   (Zinc unna boot, kerlix, coban)   ML    8/13/24            Left lower medial leg (pre)                             Left lower lateral leg (healed)                      Right lower anterior leg (pre)                          Left lower medial leg (post)  (Silver alginate, zinc unna boot, kerlix, coban)      Debridement     Date/Time: 8/13/2024 7:41 AM     Performed by: Luis Alfredo Ch MD  Authorized by: Luis Alfredo Ch MD    Time out: Immediately prior to  "procedure a "time out" was called to verify the correct patient, procedure, equipment, support staff and site/side marked as required.     Consent Done?:  Yes (Verbal)     Preparation: Patient was prepped and draped with aseptic technique    Local anesthesia used?: No       Wound Details:    Location:  Left leg (Lower medial)    Type of Debridement:  Non-excisional       Length (cm):  4.2       Area (sq cm):  13.44       Width (cm):  3.2       Percent Debrided (%):  30       Depth (cm):  0.2       Total Area Debrided (sq cm):  4.03    Depth of debridement:  Epidermis/Dermis    Tissue debrided:  Epidermis    Devitalized tissue debrided:  Biofilm, Exudate and Slough    Instruments:  Curette (freer)  Bleeding:  None  Patient tolerance:  Patient tolerated the procedure well with no immediate complications  1st Wound Pain Assessment: 1                Assessment:           ICD-10-CM ICD-9-CM   1. Chronic venous insufficiency  I87.2 459.81   2. Venous stasis ulcers of both lower extremities  I83.019 459.81    I83.029 707.10    L97.919     L97.929              Procedures:   Selective - freer    [] Yes [] No   I & D performed  [] Yes [] No   Excisional debridement performed  [x] Yes [] No   Selective debridement performed           [] Yes [] No   Mechanical debridement performed         [] Yes [x] No  Silver nitrate applied                                     [] Yes [] No  Labs ordered this visit                                  [] Yes [] No   Imaging ordered this visit                           [] Yes [] No   Tissue pathology and/or culture taken         MEDICATIONS    Current Outpatient Medications:     LASIX 40 mg tablet, Take 40 mg by mouth., Disp: , Rfl:     metoprolol succinate (TOPROL-XL) 25 MG 24 hr tablet, Take 50 mg by mouth 2 (two) times daily., Disp: , Rfl:     potassium chloride SA (K-DUR,KLOR-CON) 20 MEQ tablet, Take 20 mEq by mouth., Disp: , Rfl:     rosuvastatin (CRESTOR) 20 MG tablet, Take 20 mg by mouth " "every evening., Disp: , Rfl:     tamsulosin (FLOMAX) 0.4 mg Cap, Take 0.4 mg by mouth., Disp: , Rfl:     XARELTO 20 mg Tab, Take 20 mg by mouth., Disp: , Rfl:     collagenase (SANTYL) ointment, Apply topically once daily. Apply nickel thick to wound bed, changing daily (Patient not taking: Reported on 8/13/2024), Disp: 90 g, Rfl: 2    mupirocin (BACTROBAN) 2 % ointment, , Disp: , Rfl:     SSD 1 % cream, , Disp: , Rfl:  Review of patient's allergies indicates:  No Known Allergies    DIAGNOSTICS    Labs/Scans/Micro:    CBC:   Lab Results   Component Value Date    WBC 3.88 (L) 09/11/2023    HGB 12.8 (L) 09/11/2023    HCT 39.7 (L) 09/11/2023    MCV 90.8 09/11/2023     09/11/2023     Sedimentation rate: No results found for: "SEDRATE"     C-reactive protein: No results found for: "CRP"     Chemistry: 3/12/24  Comprehensive Metabolic Panel       Component Ref Range & Units 4 mo ago  (3/12/24)   Sodium 136 - 145 mmol/L 136   Potassium 3.5 - 5.1 mmol/L 4.2   Chloride 98 - 107 mmol/L 100   CO2 23 - 31 mmol/L 27   Glucose 82 - 115 mg/dL 105   Blood Urea Nitrogen 8.4 - 25.7 mg/dL 21.0   Creatinine 0.73 - 1.18 mg/dL 0.82   Calcium 8.8 - 10.0 mg/dL 9.6   Protein Total 5.8 - 7.6 gm/dL 7.4   Albumin 3.4 - 4.8 g/dL 3.8   Globulin 2.4 - 3.5 gm/dL 3.6 High    Albumin/Globulin Ratio 1.1 - 2.0 ratio 1.1   Bilirubin Total <=1.5 mg/dL 0.5   ALP 40 - 150 unit/L 127   ALT 0 - 55 unit/L 13   AST 5 - 34 unit/L 28   eGFR mls/min/1.73/m2 >60   Resulting Agency  [facility]        HBA1C: No components found for: "HBA1C"    Ankle Brachial Index: 7/11/24 -   Right - 1.34  Left - 1.29    Imaging:    Plain film: No results found for this or any previous visit.    MRI: No results found for this or any previous visit.    Bone Scan: No results found for this or any previous visit.    Vascular studies:    7/11/24 -   Arterial:  FINDINGS:  Ultrasound Doppler and color flow imaging were performed on the arteries of the left lower extremity.  A tri " phasic flow wave pattern is evident throughout the visualized arteries of the left lower extremity no significant localized atherosclerotic plaque formation or focal stenosis noted to the visualized arteries of the left lower extremity.  The right ankle brachial index is 1.34 and the left is 1.29.  Impression:  1.  No significant localized atherosclerotic plaque formation or focal stenosis noted to the visualized arteries of theleft lower extremity.    Venous:  FINDINGS:  There is normal compression and flow noted in the  common femoral vein, superficial femoral vein, popliteal vein, and  posterior tibial veinbilaterally.  No evidence of deep venous thrombosis is identified. No significant venous reflux is identified.  The left greater saphenous vein has been surgically harvested.  Impression:  1. No deep venous thrombosis identified to the lower extremities bilaterally.    Microbiology:   7/24/24 - Staph Aureus - Doxy Rx sent        HOME HEALTH AGENCY:  none  WOUND CARE ORDERS:  Left medial lower leg wound: Cleanse with wound cleanser, apply silver alginate, zinc unna boot, kerlix, and coban to be changed on Tuesday and Fridays   Right anterior lower leg wound: Cleanse with wound cleanser, apply zinc unna boot, kerlix, and coban to be changed on Tuesday and Fridays     Follow up in 3 days (on 8/16/2024) for nurse visit/unna boots.

## 2024-08-16 ENCOUNTER — HOSPITAL ENCOUNTER (OUTPATIENT)
Dept: WOUND CARE | Facility: HOSPITAL | Age: 84
Discharge: HOME | End: 2024-08-16
Attending: FAMILY MEDICINE
Payer: MEDICARE

## 2024-08-16 VITALS
BODY MASS INDEX: 33.48 KG/M2 | DIASTOLIC BLOOD PRESSURE: 72 MMHG | HEART RATE: 107 BPM | RESPIRATION RATE: 19 BRPM | SYSTOLIC BLOOD PRESSURE: 127 MMHG | WEIGHT: 274.94 LBS | HEIGHT: 76 IN

## 2024-08-16 DIAGNOSIS — I83.029 VENOUS STASIS ULCERS OF BOTH LOWER EXTREMITIES: ICD-10-CM

## 2024-08-16 DIAGNOSIS — L97.929 VENOUS STASIS ULCERS OF BOTH LOWER EXTREMITIES: ICD-10-CM

## 2024-08-16 DIAGNOSIS — I83.019 VENOUS STASIS ULCERS OF BOTH LOWER EXTREMITIES: ICD-10-CM

## 2024-08-16 DIAGNOSIS — I87.2 CHRONIC VENOUS INSUFFICIENCY: Primary | ICD-10-CM

## 2024-08-16 DIAGNOSIS — L97.919 VENOUS STASIS ULCERS OF BOTH LOWER EXTREMITIES: ICD-10-CM

## 2024-08-16 PROCEDURE — 27000999 HC MEDICAL RECORD PHOTO DOCUMENTATION

## 2024-08-16 PROCEDURE — 99211 OFF/OP EST MAY X REQ PHY/QHP: CPT | Mod: 25

## 2024-08-16 PROCEDURE — 29580 STRAPPING UNNA BOOT: CPT

## 2024-08-16 NOTE — PROGRESS NOTES
Home Health: None  Smoker   [] Yes   [x] No   Diabetic  [] Yes  [x] No   Vascular Surgeon: Dr. Carter  Vascular procedures [] Yes [x] No   If so, when and what was done?  Recent Ultrasounds [x] Yes [] No   If so, when were they done? (7/29/24 - venous/arterial)  Compression Pumps  [] Yes [x] No       Right ankle - 24.6 cm     Right calf - 41.3 cm     Left ankle - 26 cm     Left calf - 42.3 cm  Doppler done in office? [x] Yes [] No   Date: 7/10/24     Right dorsalis: monophasic     Right posterior: biphasic     Left dorsalis: biphasic     Left posterior: abnormal  Is the patient eligible for a graft, and/or currently grafting? [] Yes [] No    If so, which one/size?    NOTES  Nurse visit only today (8/16/24)    Subjective:       Patient ID: Piter Farrell is a 84 y.o. male.    Chief Complaint: Venous Ulcer (Right lower anterior leg/Left lower medial leg)    HPI    7/10/24 - Mr. Farrell is an 84 year old  male who presents with bilateral lower extremity venous ulcers.  His PCP is Dr. Jesus Hernandez, who referred him to wound clinic.    4/16/24: saw PCP for multiple sores on lower extremities, was given Augmentin 875 mg BID and Bumetanide (no culture was obtained).  4/25/24: saw PCP again for bacterial infection of lower extremities, refilled Augmentin 875 mg BID for 5 days (no culture was obtained)  5/2/24: saw PCP for left leg, was given Silvadine  5/28/24: saw PCP, suggested that he use compression stockings and follow up with his cardiologist (Dr. Carter), he did not do either of these things  7/9/24: saw PCP given Lasix, Cefzil and referred to wound care  He has a history of triple bypass and CHF.   He states that he had venous US done on his legs about 1 month ago with Dr. Carter.  These results were reviewed.  No arterial U/S were performed and the study shows that it was incomplete due to patient resistance.   He is not a diabetic.   He states that he was seen in our clinic about 3 years ago for the same  issue.    Today his bilateral lower extremities have multiple open areas that do appear venous in nature.  Because his venous ultrasounds were in conclusive an incomplete we will be ordering full cardiovascular ultrasounds.  He does have 2+ edema as well as hemosiderin staining.  We would like to compress, however, until we have arterial clearing we are hesitant to do so as this has not been addressed.  We did have a lengthy discussion today regarding his congestive heart failure, the need to compress, the impact that compression may have on his CHF due to moving the fluid from his legs to his chest, as well as the need to verify his arterial status prior to compressing.  He is in agreement with all of this and today we will begin covering the open ulcers and applying a Tubigrip over the Kerlix to hold the dressings in place.  He was instructed that he should alternate short elevation of the legs with dependent hanging of the legs so that blood flow can continue to the feet but at the same time tried to begin offloading some of the legs.  He is ambulatory and therefore would be able to wear an Unna boot wound we are at that point.  He does been the majority of his time at a local skilled nursing facility with his handicapped son.   All 10 toe nails today were quite lengthy and were trimmed for the patient.    7/17/24 - Mr. Farrell is here today for followup on his bilateral lower extremity venous ulcers as well as a small wound to the right 2nd toe.  The right 2nd toe was assessed and remains stable.  The scattered wounds at the lower extremities were selectively debrided.  They are beginning to make progress and there are couple of areas were epithelial tissue is beginning to my great from the edges.  He has completed his p.o. Cefzil.  We are anxious to begin compressing the lower extremities, however, we do not have arterial clearing at this time.  He is scheduled for cardiovascular ultrasounds on 07/29/2024 at  10:00 a.m..  We do have venous ultrasounds on file, however, we do not have arterial.  Once arterial ultrasounds or clear we will begin compressing using Unna boots.    7/25/24 - The patient returns today for f/up on his bilateral lower extremity venous ulcers.  The wounds have all improved since his last visit on 7/17/24, however there is some slight erythema at the left lower extremity.  A culture was obtained of the left leg.  He is scheduled for bilateral venous/arterial US on 7/29/24. However, he is concerned about using compression and wound like to discuss it with his cardiologist, Dr. Carter.   Once the U/S are done, if there is any venous reflux noted these will be sent to Dr. Carter for evaluation and consultation with the patient prior to compression.     7/31/24 - Mr. Farrell returns today for the bilateral lower extremity venous ulcers.  He did complete his cardiovascular U/S on 7/29/24.  Both his arterial and venous studies were normal.  He also had a wound culture done on 7/25/24 which shows 100% Staph aureus.  He was prescribed Doxycycline today x 10 days.  We will also be authorizing Dalvance infusion for him in treatment of Staph aureus.  We would like to begin compression with Unna Boots, however, the patient would first like to clear that with his cardiologist.  He does have an adequate EF.  He does have an appt with Dr. Carter (cardio) this afternoon at 2:30 to have a halter monitor placed due to his a-fib. He was given a copy of his vascular studies to review at that time and will discus compression. Today the wounds on the left leg were selectively debrided.  Santyl has been ordered and we discussed the cost.  If he is unable to obtain he will continue with Mesalt.     August 7, 2024:    84-year-old white male, she of edema in his legs, and some venous stasis ulcers.  The recent venous ultrasound showed good flow in all of his major veins.  There was no DVT.  His CHF appears to be well  controlled.  He will an attempt at compression of both legs.  The venous stasis ulcers in his legs are somewhat stalled at this time.  There is a left lower medial venous stasis ulcer, with slough present.  It is slightly painful today.    August 9, 2024:  84-year-old white male, who is here for a nurse visit only, to have his dressings changed.  There are no new issues.    August 13, 2024:  84-year-old white male, who is here for follow up of his chronic venous insufficiency, left lower leg venous stasis ulcer.  These wounds are all improving.  The wound on his right lower leg has just about healed.  The edema is much improved with Unna boots.  He does have compression stockings at home, for future needs.    August 16, 2024:  84-year-old white male, who is here for a nurse visit only, to have his Unna boot changed.  There are no new issues.  He has venous stasis ulcers.    Review of Systems   Constitutional: Negative.    Respiratory: Negative.     Cardiovascular: Negative.    Skin:         As documented in the HPI.   All other systems reviewed and are negative.        Objective:        Vitals:    08/16/24 0743   BP: 127/72   Pulse: 107   Resp: 19       Physical Exam  Vitals and nursing note reviewed.   Constitutional:       Appearance: Normal appearance.   Cardiovascular:      Rate and Rhythm: Normal rate.   Pulmonary:      Effort: Pulmonary effort is normal.   Skin:     General: Skin is warm and dry.      Comments: There is only trace edema in the pretibial legs.  He has the stasis ulcer on the left medial leg, which I debrided with a curette, removing some biofilm, exudate, slough.  I was able to only debride 30% of the wound bed, secondary to pain.  There is a small right lower leg venous stasis ulcer.  This has just about completely healed.   Neurological:      Mental Status: He is alert.            Wound 07/10/24 1154 Venous Ulcer Right anterior;lower Leg #1 (Active)   07/10/24 1154   Present on Original  Admission: Y   Primary Wound Type: Venous ulcer   Side: Right   Orientation: anterior;lower   Location: Leg   Wound Approximate Age at First Assessment (Weeks):    Wound Number: #1   Is this injury device related?:    Incision Type:    Closure Method:    Wound Description (Comments):    Type:    Additional Comments:    Ankle-Brachial Index:    Pulses:    Removal Indication and Assessment:    Wound Outcome:    Dressing Appearance Dried drainage 08/16/24 0743   Drainage Amount Small 08/16/24 0743   Appearance Pink;Moist 08/16/24 0743   Tissue loss description Full thickness 08/16/24 0743   Periwound Area Intact 08/16/24 0743   Wound Edges Open 08/16/24 0743   Wound Length (cm) 0.2 cm 08/16/24 0743   Wound Width (cm) 0.2 cm 08/16/24 0743   Wound Depth (cm) 0.2 cm 08/16/24 0743   Wound Volume (cm^3) 0.008 cm^3 08/16/24 0743   Wound Surface Area (cm^2) 0.04 cm^2 08/16/24 0743   Care Cleansed with:;Wound cleanser 08/16/24 0743   Dressing Applied;Other (comment) 08/16/24 0743   Dressing Change Due 08/23/24 08/16/24 0743            Wound 07/10/24 1154 Venous Ulcer Left lower;medial Leg #2 (Active)   07/10/24 1154   Present on Original Admission: Y   Primary Wound Type: Venous ulcer   Side: Left   Orientation: lower;medial   Location: Leg   Wound Approximate Age at First Assessment (Weeks):    Wound Number: #2   Is this injury device related?:    Incision Type:    Closure Method:    Wound Description (Comments):    Type:    Additional Comments:    Ankle-Brachial Index:    Pulses:    Removal Indication and Assessment:    Wound Outcome:    Dressing Appearance Moist drainage 08/16/24 0743   Drainage Amount Moderate 08/16/24 0743   Drainage Characteristics/Odor Serosanguineous 08/16/24 0743   Appearance Moist;Red;Granulating;Epithelialization 08/16/24 0743   Tissue loss description Full thickness 08/16/24 0743   Periwound Area Intact 08/16/24 0743   Wound Edges Open 08/16/24 0743   Wound Length (cm) 4 cm 08/16/24 0743   Wound  Width (cm) 3 cm 08/16/24 0743   Wound Depth (cm) 0.2 cm 08/16/24 0743   Wound Volume (cm^3) 2.4 cm^3 08/16/24 0743   Wound Surface Area (cm^2) 12 cm^2 08/16/24 0743   Care Cleansed with:;Wound cleanser 08/16/24 0743   Dressing Applied;Other (comment) 08/16/24 0743   Dressing Change Due 08/23/24 08/16/24 0743     8/16/24    Left medial lower leg   Silver alginate, zinc unna boot, kerlix, coban       Left anterior lower leg   Zinc unna boot, kerlix, coban   CT          Assessment:       No diagnosis found.      Procedures:       [] Yes [] No   I & D performed  [] Yes [] No   Excisional debridement performed  [] Yes [] No   Selective debridement performed           [] Yes [] No   Mechanical debridement performed         [] Yes [] No  Silver nitrate applied                                     [] Yes [] No  Labs ordered this visit                                  [] Yes [] No   Imaging ordered this visit                           [] Yes [] No   Tissue pathology and/or culture taken         MEDICATIONS    Current Outpatient Medications:     collagenase (SANTYL) ointment, Apply topically once daily. Apply nickel thick to wound bed, changing daily, Disp: 90 g, Rfl: 2    LASIX 40 mg tablet, Take 40 mg by mouth., Disp: , Rfl:     metoprolol succinate (TOPROL-XL) 25 MG 24 hr tablet, Take 50 mg by mouth 2 (two) times daily., Disp: , Rfl:     mupirocin (BACTROBAN) 2 % ointment, , Disp: , Rfl:     potassium chloride SA (K-DUR,KLOR-CON) 20 MEQ tablet, Take 20 mEq by mouth., Disp: , Rfl:     rosuvastatin (CRESTOR) 20 MG tablet, Take 20 mg by mouth every evening., Disp: , Rfl:     SSD 1 % cream, , Disp: , Rfl:     tamsulosin (FLOMAX) 0.4 mg Cap, Take 0.4 mg by mouth., Disp: , Rfl:     XARELTO 20 mg Tab, Take 20 mg by mouth., Disp: , Rfl:  Review of patient's allergies indicates:  No Known Allergies    DIAGNOSTICS    Labs/Scans/Micro:    CBC:   Lab Results   Component Value Date    WBC 3.88 (L) 09/11/2023    HGB 12.8 (L) 09/11/2023  "   HCT 39.7 (L) 09/11/2023    MCV 90.8 09/11/2023     09/11/2023     Sedimentation rate: No results found for: "SEDRATE"     C-reactive protein: No results found for: "CRP"     Chemistry: 3/12/24  Comprehensive Metabolic Panel       Component Ref Range & Units 4 mo ago  (3/12/24)   Sodium 136 - 145 mmol/L 136   Potassium 3.5 - 5.1 mmol/L 4.2   Chloride 98 - 107 mmol/L 100   CO2 23 - 31 mmol/L 27   Glucose 82 - 115 mg/dL 105   Blood Urea Nitrogen 8.4 - 25.7 mg/dL 21.0   Creatinine 0.73 - 1.18 mg/dL 0.82   Calcium 8.8 - 10.0 mg/dL 9.6   Protein Total 5.8 - 7.6 gm/dL 7.4   Albumin 3.4 - 4.8 g/dL 3.8   Globulin 2.4 - 3.5 gm/dL 3.6 High    Albumin/Globulin Ratio 1.1 - 2.0 ratio 1.1   Bilirubin Total <=1.5 mg/dL 0.5   ALP 40 - 150 unit/L 127   ALT 0 - 55 unit/L 13   AST 5 - 34 unit/L 28   eGFR mls/min/1.73/m2 >60   Resulting Agency  [facility]        HBA1C: No components found for: "HBA1C"    Ankle Brachial Index: 7/11/24 -   Right - 1.34  Left - 1.29    Imaging:    Plain film: No results found for this or any previous visit.    MRI: No results found for this or any previous visit.    Bone Scan: No results found for this or any previous visit.    Vascular studies:    7/11/24 -   Arterial:  FINDINGS:  Ultrasound Doppler and color flow imaging were performed on the arteries of the left lower extremity.  A tri phasic flow wave pattern is evident throughout the visualized arteries of the left lower extremity no significant localized atherosclerotic plaque formation or focal stenosis noted to the visualized arteries of the left lower extremity.  The right ankle brachial index is 1.34 and the left is 1.29.  Impression:  1.  No significant localized atherosclerotic plaque formation or focal stenosis noted to the visualized arteries of theleft lower extremity.    Venous:  FINDINGS:  There is normal compression and flow noted in the  common femoral vein, superficial femoral vein, popliteal vein, and  posterior tibial " veinbilaterally.  No evidence of deep venous thrombosis is identified. No significant venous reflux is identified.  The left greater saphenous vein has been surgically harvested.  Impression:  1. No deep venous thrombosis identified to the lower extremities bilaterally.    Microbiology:   7/24/24 - Staph Aureus - Doxy Rx sent        HOME HEALTH AGENCY:  none  WOUND CARE ORDERS:  Left medial lower leg wound: Cleanse with wound cleanser, apply silver alginate, zinc unna boot, kerlix, and coban to be changed on Tuesday and Fridays   Right anterior lower leg wound: Cleanse with wound cleanser, apply zinc unna boot, kerlix, and coban to be changed on Tuesday and Fridays     Follow up in 5 days (on 8/21/2024) for Nurse visit - BLE.

## 2024-08-21 ENCOUNTER — HOSPITAL ENCOUNTER (OUTPATIENT)
Dept: WOUND CARE | Facility: HOSPITAL | Age: 84
Discharge: HOME | End: 2024-08-21
Attending: FAMILY MEDICINE
Payer: MEDICARE

## 2024-08-21 VITALS
SYSTOLIC BLOOD PRESSURE: 107 MMHG | WEIGHT: 274.94 LBS | HEART RATE: 104 BPM | HEIGHT: 76 IN | DIASTOLIC BLOOD PRESSURE: 58 MMHG | BODY MASS INDEX: 33.48 KG/M2 | RESPIRATION RATE: 18 BRPM

## 2024-08-21 DIAGNOSIS — I83.019 VENOUS STASIS ULCERS OF BOTH LOWER EXTREMITIES: ICD-10-CM

## 2024-08-21 DIAGNOSIS — I83.029 VENOUS STASIS ULCERS OF BOTH LOWER EXTREMITIES: ICD-10-CM

## 2024-08-21 DIAGNOSIS — L97.929 VENOUS STASIS ULCERS OF BOTH LOWER EXTREMITIES: ICD-10-CM

## 2024-08-21 DIAGNOSIS — L97.919 VENOUS STASIS ULCERS OF BOTH LOWER EXTREMITIES: ICD-10-CM

## 2024-08-21 DIAGNOSIS — I87.2 CHRONIC VENOUS INSUFFICIENCY: Primary | ICD-10-CM

## 2024-08-21 PROCEDURE — 99211 OFF/OP EST MAY X REQ PHY/QHP: CPT

## 2024-08-21 PROCEDURE — 27000999 HC MEDICAL RECORD PHOTO DOCUMENTATION

## 2024-08-21 PROCEDURE — 29580 STRAPPING UNNA BOOT: CPT

## 2024-08-21 NOTE — PROGRESS NOTES
[facility]: None  Smoker   [] Yes   [x] No   Diabetic  [] Yes  [x] No   Vascular Surgeon: Dr. Carter  Vascular procedures [] Yes [x] No   If so, when and what was done?  Recent Ultrasounds [x] Yes [] No   If so, when were they done? (7/29/24 - venous/arterial)  Compression Pumps  [] Yes [x] No       Right ankle - 24.5 cm     Right calf - 41.5 cm     Left ankle - 26.4 cm     Left calf - 42.6 cm  Doppler done in office? [x] Yes [] No   Date: 7/10/24     Right dorsalis: monophasic     Right posterior: biphasic     Left dorsalis: biphasic     Left posterior: abnormal  Is the patient eligible for a graft, and/or currently grafting? [] Yes [] No    If so, which one/size?        Subjective:       Patient ID: Piter Farrell is a 84 y.o. male.    Chief Complaint: Venous Ulcer ((Right lower anterior leg/Left lower medial leg))    HPI    7/10/24 - Mr. Farrell is an 84 year old  male who presents with bilateral lower extremity venous ulcers.  His PCP is Dr. Jesus Hernandez, who referred him to wound clinic.    4/16/24: saw PCP for multiple sores on lower extremities, was given Augmentin 875 mg BID and Bumetanide (no culture was obtained).  4/25/24: saw PCP again for bacterial infection of lower extremities, refilled Augmentin 875 mg BID for 5 days (no culture was obtained)  5/2/24: saw PCP for left leg, was given Silvadine  5/28/24: saw PCP, suggested that he use compression stockings and follow up with his cardiologist (Dr. Carter), he did not do either of these things  7/9/24: saw PCP given Lasix, Cefzil and referred to wound care  He has a history of triple bypass and CHF.   He states that he had venous US done on his legs about 1 month ago with Dr. Carter.  These results were reviewed.  No arterial U/S were performed and the study shows that it was incomplete due to patient resistance.   He is not a diabetic.   He states that he was seen in our clinic about 3 years ago for the same issue.    Today his bilateral lower  extremities have multiple open areas that do appear venous in nature.  Because his venous ultrasounds were in conclusive an incomplete we will be ordering full cardiovascular ultrasounds.  He does have 2+ edema as well as hemosiderin staining.  We would like to compress, however, until we have arterial clearing we are hesitant to do so as this has not been addressed.  We did have a lengthy discussion today regarding his congestive heart failure, the need to compress, the impact that compression may have on his CHF due to moving the fluid from his legs to his chest, as well as the need to verify his arterial status prior to compressing.  He is in agreement with all of this and today we will begin covering the open ulcers and applying a Tubigrip over the Kerlix to hold the dressings in place.  He was instructed that he should alternate short elevation of the legs with dependent hanging of the legs so that blood flow can continue to the feet but at the same time tried to begin offloading some of the legs.  He is ambulatory and therefore would be able to wear an Unna boot wound we are at that point.  He does been the majority of his time at a local skilled nursing facility with his handicapped son.   All 10 toe nails today were quite lengthy and were trimmed for the patient.    7/17/24 - Mr. Farrell is here today for followup on his bilateral lower extremity venous ulcers as well as a small wound to the right 2nd toe.  The right 2nd toe was assessed and remains stable.  The scattered wounds at the lower extremities were selectively debrided.  They are beginning to make progress and there are couple of areas were epithelial tissue is beginning to my great from the edges.  He has completed his p.o. Cefzil.  We are anxious to begin compressing the lower extremities, however, we do not have arterial clearing at this time.  He is scheduled for cardiovascular ultrasounds on 07/29/2024 at 10:00 a.m..  We do have venous  ultrasounds on file, however, we do not have arterial.  Once arterial ultrasounds or clear we will begin compressing using Unna boots.    7/25/24 - The patient returns today for f/up on his bilateral lower extremity venous ulcers.  The wounds have all improved since his last visit on 7/17/24, however there is some slight erythema at the left lower extremity.  A culture was obtained of the left leg.  He is scheduled for bilateral venous/arterial US on 7/29/24. However, he is concerned about using compression and wound like to discuss it with his cardiologist, Dr. Carter.   Once the U/S are done, if there is any venous reflux noted these will be sent to Dr. Carter for evaluation and consultation with the patient prior to compression.     7/31/24 - Mr. Farrell returns today for the bilateral lower extremity venous ulcers.  He did complete his cardiovascular U/S on 7/29/24.  Both his arterial and venous studies were normal.  He also had a wound culture done on 7/25/24 which shows 100% Staph aureus.  He was prescribed Doxycycline today x 10 days.  We will also be authorizing Dalvance infusion for him in treatment of Staph aureus.  We would like to begin compression with Unna Boots, however, the patient would first like to clear that with his cardiologist.  He does have an adequate EF.  He does have an appt with Dr. Carter (cardio) this afternoon at 2:30 to have a halter monitor placed due to his a-fib. He was given a copy of his vascular studies to review at that time and will discus compression. Today the wounds on the left leg were selectively debrided.  Santyl has been ordered and we discussed the cost.  If he is unable to obtain he will continue with Mesalt.     August 7, 2024:    84-year-old white male, she of edema in his legs, and some venous stasis ulcers.  The recent venous ultrasound showed good flow in all of his major veins.  There was no DVT.  His CHF appears to be well controlled.  He will an attempt at  compression of both legs.  The venous stasis ulcers in his legs are somewhat stalled at this time.  There is a left lower medial venous stasis ulcer, with slough present.  It is slightly painful today.    August 9, 2024:  84-year-old white male, who is here for a nurse visit only, to have his dressings changed.  There are no new issues.    August 13, 2024:  84-year-old white male, who is here for follow up of his chronic venous insufficiency, left lower leg venous stasis ulcer.  These wounds are all improving.  The wound on his right lower leg has just about healed.  The edema is much improved with Unna boots.  He does have compression stockings at home, for future needs.    August 16, 2024:  84-year-old white male, who is here for a nurse visit only, to have his Unna boot changed.  There are no new issues.  He has venous stasis ulcers.    8/21/24 - Patient is seen today for a nurse visit only for Unna boot change.  No issues or complaints were brought to this provider's attention during this visit.     Review of Systems   Constitutional: Negative.    Respiratory: Negative.     Cardiovascular: Negative.    Skin:         As documented in the HPI.   All other systems reviewed and are negative.        Objective:        Vitals:    08/21/24 0909   BP: (!) 107/58   Pulse: 104   Resp: 18         Physical Exam  Vitals and nursing note reviewed.   Constitutional:       Appearance: Normal appearance.   Cardiovascular:      Rate and Rhythm: Normal rate.   Pulmonary:      Effort: Pulmonary effort is normal.   Skin:     General: Skin is warm and dry.      Comments: Venous ulcers bilateral   Neurological:      Mental Status: He is alert.            Wound 07/10/24 1154 Venous Ulcer Right anterior;lower Leg #1 (Active)   07/10/24 1154   Present on Original Admission: Y   Primary Wound Type: Venous ulcer   Side: Right   Orientation: anterior;lower   Location: Leg   Wound Approximate Age at First Assessment (Weeks):    Wound Number:  #1   Is this injury device related?:    Incision Type:    Closure Method:    Wound Description (Comments):    Type:    Additional Comments:    Ankle-Brachial Index:    Pulses:    Removal Indication and Assessment:    Wound Outcome:    Dressing Appearance Moist drainage 08/21/24 0800   Drainage Amount Moderate 08/21/24 0800   Drainage Characteristics/Odor Serosanguineous 08/21/24 0800   Appearance Pink;Moist 08/21/24 0800   Tissue loss description Full thickness 08/21/24 0800   Periwound Area Dry 08/21/24 0800   Wound Edges Open 08/21/24 0800   Wound Length (cm) 0.2 cm 08/21/24 0800   Wound Width (cm) 0.2 cm 08/21/24 0800   Wound Depth (cm) 0.2 cm 08/21/24 0800   Wound Volume (cm^3) 0.008 cm^3 08/21/24 0800   Wound Surface Area (cm^2) 0.04 cm^2 08/21/24 0800   Care Cleansed with:;Wound cleanser 08/21/24 0800   Dressing Applied 08/21/24 0800   Dressing Change Due 08/23/24 08/21/24 0800            Wound 07/10/24 1154 Venous Ulcer Left lower;medial Leg #2 (Active)   07/10/24 1154   Present on Original Admission: Y   Primary Wound Type: Venous ulcer   Side: Left   Orientation: lower;medial   Location: Leg   Wound Approximate Age at First Assessment (Weeks):    Wound Number: #2   Is this injury device related?:    Incision Type:    Closure Method:    Wound Description (Comments):    Type:    Additional Comments:    Ankle-Brachial Index:    Pulses:    Removal Indication and Assessment:    Wound Outcome:    Dressing Appearance Moist drainage 08/21/24 0800   Drainage Amount Moderate 08/21/24 0800   Drainage Characteristics/Odor Serosanguineous 08/21/24 0800   Appearance Red;Moist;Bleeding;Epithelialization 08/21/24 0800   Tissue loss description Full thickness 08/21/24 0800   Periwound Area Edematous;Dry 08/21/24 0800   Wound Edges Open 08/21/24 0800   Wound Length (cm) 3.7 cm 08/21/24 0800   Wound Width (cm) 2.5 cm 08/21/24 0800   Wound Depth (cm) 0.2 cm 08/21/24 0800   Wound Volume (cm^3) 1.85 cm^3 08/21/24 0800   Wound  Surface Area (cm^2) 9.25 cm^2 08/21/24 0800   Care Cleansed with:;Wound cleanser 08/21/24 0800   Dressing Applied 08/21/24 0800   Dressing Change Due 08/23/24 08/21/24 0800     8/21/24        Right lower leg (pre)                                       Left lower medial leg (pre)  (zinc unna boot, kerlix, coban)                     (silver alginate, ABD, zinc unna boot, kerlix, coban)        Assessment:         ICD-10-CM ICD-9-CM   1. Chronic venous insufficiency  I87.2 459.81   2. Venous stasis ulcers of both lower extremities  I83.019 459.81    I83.029 707.10    L97.919     L97.929          Procedures:     Nurse visit only; unna boot change only    [] Yes [] No   I & D performed  [] Yes [] No   Excisional debridement performed  [] Yes [] No   Selective debridement performed           [] Yes [] No   Mechanical debridement performed         [] Yes [] No  Silver nitrate applied                                     [] Yes [] No  Labs ordered this visit                                  [] Yes [] No   Imaging ordered this visit                           [] Yes [] No   Tissue pathology and/or culture taken         MEDICATIONS    Current Outpatient Medications:     LASIX 40 mg tablet, Take 40 mg by mouth., Disp: , Rfl:     metoprolol succinate (TOPROL-XL) 25 MG 24 hr tablet, Take 50 mg by mouth 2 (two) times daily., Disp: , Rfl:     potassium chloride SA (K-DUR,KLOR-CON) 20 MEQ tablet, Take 20 mEq by mouth., Disp: , Rfl:     rosuvastatin (CRESTOR) 20 MG tablet, Take 20 mg by mouth every evening., Disp: , Rfl:     tamsulosin (FLOMAX) 0.4 mg Cap, Take 0.4 mg by mouth., Disp: , Rfl:     XARELTO 20 mg Tab, Take 20 mg by mouth., Disp: , Rfl:     collagenase (SANTYL) ointment, Apply topically once daily. Apply nickel thick to wound bed, changing daily (Patient not taking: Reported on 8/21/2024), Disp: 90 g, Rfl: 2    mupirocin (BACTROBAN) 2 % ointment, , Disp: , Rfl:     SSD 1 % cream, , Disp: , Rfl:  Review of patient's  "allergies indicates:  No Known Allergies    DIAGNOSTICS    Labs/Scans/Micro:    CBC:   Lab Results   Component Value Date    WBC 3.88 (L) 09/11/2023    HGB 12.8 (L) 09/11/2023    HCT 39.7 (L) 09/11/2023    MCV 90.8 09/11/2023     09/11/2023     Sedimentation rate: No results found for: "SEDRATE"     C-reactive protein: No results found for: "CRP"     Chemistry: 3/12/24  Comprehensive Metabolic Panel       Component Ref Range & Units 4 mo ago  (3/12/24)   Sodium 136 - 145 mmol/L 136   Potassium 3.5 - 5.1 mmol/L 4.2   Chloride 98 - 107 mmol/L 100   CO2 23 - 31 mmol/L 27   Glucose 82 - 115 mg/dL 105   Blood Urea Nitrogen 8.4 - 25.7 mg/dL 21.0   Creatinine 0.73 - 1.18 mg/dL 0.82   Calcium 8.8 - 10.0 mg/dL 9.6   Protein Total 5.8 - 7.6 gm/dL 7.4   Albumin 3.4 - 4.8 g/dL 3.8   Globulin 2.4 - 3.5 gm/dL 3.6 High    Albumin/Globulin Ratio 1.1 - 2.0 ratio 1.1   Bilirubin Total <=1.5 mg/dL 0.5   ALP 40 - 150 unit/L 127   ALT 0 - 55 unit/L 13   AST 5 - 34 unit/L 28   eGFR mls/min/1.73/m2 >60   Resulting Agency  [facility]        HBA1C: No components found for: "HBA1C"    Ankle Brachial Index: 7/11/24 -   Right - 1.34  Left - 1.29    Imaging:    Plain film: No results found for this or any previous visit.    MRI: No results found for this or any previous visit.    Bone Scan: No results found for this or any previous visit.    Vascular studies:    7/11/24 -   Arterial:  FINDINGS:  Ultrasound Doppler and color flow imaging were performed on the arteries of the left lower extremity.  A tri phasic flow wave pattern is evident throughout the visualized arteries of the left lower extremity no significant localized atherosclerotic plaque formation or focal stenosis noted to the visualized arteries of the left lower extremity.  The right ankle brachial index is 1.34 and the left is 1.29.  Impression:  1.  No significant localized atherosclerotic plaque formation or focal stenosis noted to the visualized arteries of theleft lower " extremity.    Venous:  FINDINGS:  There is normal compression and flow noted in the  common femoral vein, superficial femoral vein, popliteal vein, and  posterior tibial veinbilaterally.  No evidence of deep venous thrombosis is identified. No significant venous reflux is identified.  The left greater saphenous vein has been surgically harvested.  Impression:  1. No deep venous thrombosis identified to the lower extremities bilaterally.    Microbiology:   7/24/24 - Staph Aureus - Doxy Rx sent        HOME HEALTH AGENCY:  none  WOUND CARE ORDERS:  Left medial lower leg wound: Cleanse with wound cleanser, apply silver alginate, ABD, zinc unna boot, kerlix, and coban to be changed on Tuesday and Fridays   Right anterior lower leg wound: Cleanse with wound cleanser, apply zinc unna boot, kerlix, and coban to be changed on Tuesday and Fridays     Follow up in 2 days (on 8/23/2024) for unna boot.

## 2024-08-23 ENCOUNTER — HOSPITAL ENCOUNTER (OUTPATIENT)
Dept: WOUND CARE | Facility: HOSPITAL | Age: 84
Discharge: HOME | End: 2024-08-23
Attending: FAMILY MEDICINE
Payer: MEDICARE

## 2024-08-23 VITALS
WEIGHT: 274.94 LBS | SYSTOLIC BLOOD PRESSURE: 114 MMHG | RESPIRATION RATE: 18 BRPM | DIASTOLIC BLOOD PRESSURE: 64 MMHG | HEART RATE: 113 BPM | HEIGHT: 76 IN | BODY MASS INDEX: 33.48 KG/M2

## 2024-08-23 DIAGNOSIS — L97.221 VENOUS STASIS ULCER OF LEFT CALF LIMITED TO BREAKDOWN OF SKIN WITHOUT VARICOSE VEINS: ICD-10-CM

## 2024-08-23 DIAGNOSIS — I87.2 VENOUS STASIS ULCER OF LEFT CALF LIMITED TO BREAKDOWN OF SKIN WITHOUT VARICOSE VEINS: ICD-10-CM

## 2024-08-23 DIAGNOSIS — I87.2 CHRONIC VENOUS INSUFFICIENCY: Primary | ICD-10-CM

## 2024-08-23 PROCEDURE — 99213 OFFICE O/P EST LOW 20 MIN: CPT

## 2024-08-23 PROCEDURE — 27000999 HC MEDICAL RECORD PHOTO DOCUMENTATION

## 2024-08-23 PROCEDURE — 97597 DBRDMT OPN WND 1ST 20 CM/<: CPT

## 2024-08-23 PROCEDURE — 29580 STRAPPING UNNA BOOT: CPT

## 2024-08-23 NOTE — PROCEDURES
"Debridement    Date/Time: 8/23/2024 7:49 AM    Performed by: Luis Alfredo Ch MD  Authorized by: Luis Alfredo Ch MD    Time out: Immediately prior to procedure a "time out" was called to verify the correct patient, procedure, equipment, support staff and site/side marked as required.    Consent Done?:  Yes (Verbal)    Preparation: Patient was prepped and draped with aseptic technique    Local anesthesia used?: No      Wound Details:    Location:  Left leg (calf)    Type of Debridement:  Non-excisional       Length (cm):  3.5       Area (sq cm):  8.05       Width (cm):  2.3       Percent Debrided (%):  50       Depth (cm):  0.2       Total Area Debrided (sq cm):  4.03    Depth of debridement:  Epidermis/Dermis    Tissue debrided:  Epidermis    Devitalized tissue debrided:  Biofilm, Exudate and Slough    Instruments:  Curette (freer)  Bleeding:  None  Patient tolerance:  Patient tolerated the procedure well with no immediate complications  1st Wound Pain Assessment: 2    "

## 2024-08-27 ENCOUNTER — HOSPITAL ENCOUNTER (OUTPATIENT)
Dept: WOUND CARE | Facility: HOSPITAL | Age: 84
Discharge: HOME | End: 2024-08-27
Attending: FAMILY MEDICINE
Payer: MEDICARE

## 2024-08-27 VITALS
SYSTOLIC BLOOD PRESSURE: 129 MMHG | HEART RATE: 104 BPM | RESPIRATION RATE: 18 BRPM | WEIGHT: 274.94 LBS | BODY MASS INDEX: 33.48 KG/M2 | DIASTOLIC BLOOD PRESSURE: 62 MMHG | HEIGHT: 76 IN

## 2024-08-27 DIAGNOSIS — I87.2 CHRONIC VENOUS INSUFFICIENCY: Primary | ICD-10-CM

## 2024-08-27 DIAGNOSIS — L97.221 VENOUS STASIS ULCER OF LEFT CALF LIMITED TO BREAKDOWN OF SKIN WITHOUT VARICOSE VEINS: ICD-10-CM

## 2024-08-27 DIAGNOSIS — I87.2 VENOUS STASIS ULCER OF LEFT CALF LIMITED TO BREAKDOWN OF SKIN WITHOUT VARICOSE VEINS: ICD-10-CM

## 2024-08-27 PROCEDURE — 27000999 HC MEDICAL RECORD PHOTO DOCUMENTATION

## 2024-08-27 PROCEDURE — 99211 OFF/OP EST MAY X REQ PHY/QHP: CPT

## 2024-08-27 NOTE — PROGRESS NOTES
[facility]: None  Smoker   [] Yes   [x] No   Diabetic  [] Yes  [x] No   Vascular Surgeon: Dr. Carter  Vascular procedures [] Yes [x] No   If so, when and what was done?  Recent Ultrasounds [x] Yes [] No   If so, when were they done? (7/29/24 - venous/arterial)  Compression Pumps  [] Yes [x] No       Right ankle - 26 cm     Right calf - 44 cm     Left ankle - 26.2 cm     Left calf - 41 cm  Doppler done in office? [x] Yes [] No   Date: 7/10/24     Right dorsalis: monophasic     Right posterior: biphasic     Left dorsalis: biphasic     Left posterior: abnormal  Is the patient eligible for a graft, and/or currently grafting? [] Yes [] No    If so, which one/size?    Subjective:       Patient ID: Piter Farrell is a 84 y.o. male.    Chief Complaint: Venous Ulcer ((Left lower medial leg))    HPI    7/10/24 - Mr. Farrell is an 84 year old  male who presents with bilateral lower extremity venous ulcers.  His PCP is Dr. Jesus Hernandez, who referred him to wound clinic.    4/16/24: saw PCP for multiple sores on lower extremities, was given Augmentin 875 mg BID and Bumetanide (no culture was obtained).  4/25/24: saw PCP again for bacterial infection of lower extremities, refilled Augmentin 875 mg BID for 5 days (no culture was obtained)  5/2/24: saw PCP for left leg, was given Silvadine  5/28/24: saw PCP, suggested that he use compression stockings and follow up with his cardiologist (Dr. Carter), he did not do either of these things  7/9/24: saw PCP given Lasix, Cefzil and referred to wound care  He has a history of triple bypass and CHF.   He states that he had venous US done on his legs about 1 month ago with Dr. Carter.  These results were reviewed.  No arterial U/S were performed and the study shows that it was incomplete due to patient resistance.   He is not a diabetic.   He states that he was seen in our clinic about 3 years ago for the same issue.    Today his bilateral lower extremities have multiple open areas  that do appear venous in nature.  Because his venous ultrasounds were in conclusive an incomplete we will be ordering full cardiovascular ultrasounds.  He does have 2+ edema as well as hemosiderin staining.  We would like to compress, however, until we have arterial clearing we are hesitant to do so as this has not been addressed.  We did have a lengthy discussion today regarding his congestive heart failure, the need to compress, the impact that compression may have on his CHF due to moving the fluid from his legs to his chest, as well as the need to verify his arterial status prior to compressing.  He is in agreement with all of this and today we will begin covering the open ulcers and applying a Tubigrip over the Kerlix to hold the dressings in place.  He was instructed that he should alternate short elevation of the legs with dependent hanging of the legs so that blood flow can continue to the feet but at the same time tried to begin offloading some of the legs.  He is ambulatory and therefore would be able to wear an Unna boot wound we are at that point.  He does been the majority of his time at a local skilled nursing facility with his handicapped son.   All 10 toe nails today were quite lengthy and were trimmed for the patient.    7/17/24 - Mr. Farrell is here today for followup on his bilateral lower extremity venous ulcers as well as a small wound to the right 2nd toe.  The right 2nd toe was assessed and remains stable.  The scattered wounds at the lower extremities were selectively debrided.  They are beginning to make progress and there are couple of areas were epithelial tissue is beginning to my great from the edges.  He has completed his p.o. Cefzil.  We are anxious to begin compressing the lower extremities, however, we do not have arterial clearing at this time.  He is scheduled for cardiovascular ultrasounds on 07/29/2024 at 10:00 a.m..  We do have venous ultrasounds on file, however, we do not have  arterial.  Once arterial ultrasounds or clear we will begin compressing using Unna boots.    7/25/24 - The patient returns today for f/up on his bilateral lower extremity venous ulcers.  The wounds have all improved since his last visit on 7/17/24, however there is some slight erythema at the left lower extremity.  A culture was obtained of the left leg.  He is scheduled for bilateral venous/arterial US on 7/29/24. However, he is concerned about using compression and wound like to discuss it with his cardiologist, Dr. Carter.   Once the U/S are done, if there is any venous reflux noted these will be sent to Dr. Carter for evaluation and consultation with the patient prior to compression.     7/31/24 - Mr. Farrell returns today for the bilateral lower extremity venous ulcers.  He did complete his cardiovascular U/S on 7/29/24.  Both his arterial and venous studies were normal.  He also had a wound culture done on 7/25/24 which shows 100% Staph aureus.  He was prescribed Doxycycline today x 10 days.  We will also be authorizing Dalvance infusion for him in treatment of Staph aureus.  We would like to begin compression with Unna Boots, however, the patient would first like to clear that with his cardiologist.  He does have an adequate EF.  He does have an appt with Dr. Carter (cardio) this afternoon at 2:30 to have a halter monitor placed due to his a-fib. He was given a copy of his vascular studies to review at that time and will discus compression. Today the wounds on the left leg were selectively debrided.  Santyl has been ordered and we discussed the cost.  If he is unable to obtain he will continue with Mesalt.     August 7, 2024:    84-year-old white male, she of edema in his legs, and some venous stasis ulcers.  The recent venous ultrasound showed good flow in all of his major veins.  There was no DVT.  His CHF appears to be well controlled.  He will an attempt at compression of both legs.  The venous stasis  ulcers in his legs are somewhat stalled at this time.  There is a left lower medial venous stasis ulcer, with slough present.  It is slightly painful today.    August 9, 2024:  84-year-old white male, who is here for a nurse visit only, to have his dressings changed.  There are no new issues.    August 13, 2024:  84-year-old white male, who is here for follow up of his chronic venous insufficiency, left lower leg venous stasis ulcer.  These wounds are all improving.  The wound on his right lower leg has just about healed.  The edema is much improved with Unna boots.  He does have compression stockings at home, for future needs.    August 16, 2024:  84-year-old white male, who is here for a nurse visit only, to have his Unna boot changed.  There are no new issues.  He has venous stasis ulcers.    8/21/24 - Patient is seen today for a nurse visit only for Unna boot change.  No issues or complaints were brought to this provider's attention during this visit.     August 23, 2024:  84-year-old white male, who is here for follow up of his chronic venous insufficiency, left calf venous stasis ulcer.  He has no open wounds on the right leg.  He will no longer need an Unna boot on the right leg.  The wound on the left calf is improving each week.    8/27/24 - Mr. Farrell is seen today for a nurse visit only for an Unna Boot change for the left venous stasis ulcer.  No issues or concerns were brought to this providers attention during this visit.     Review of Systems   Constitutional: Negative.    Respiratory: Negative.     Cardiovascular: Negative.    Skin:         As documented in the HPI.   All other systems reviewed and are negative.        Objective:        Vitals:    08/27/24 0818   BP: 129/62   Pulse: 104   Resp: 18         Physical Exam  Vitals and nursing note reviewed.   Constitutional:       Appearance: Normal appearance.   Cardiovascular:      Rate and Rhythm: Normal rate.   Pulmonary:      Effort: Pulmonary effort  is normal.   Skin:     General: Skin is warm and dry.      Comments: Venous stasis ulcer on the left calf    Neurological:      Mental Status: He is alert.            Wound 07/10/24 1154 Venous Ulcer Left lower;medial Leg #2 (Active)   07/10/24 1154   Present on Original Admission: Y   Primary Wound Type: Venous ulcer   Side: Left   Orientation: lower;medial   Location: Leg   Wound Approximate Age at First Assessment (Weeks):    Wound Number: #2   Is this injury device related?:    Incision Type:    Closure Method:    Wound Description (Comments):    Type:    Additional Comments:    Ankle-Brachial Index:    Pulses:    Removal Indication and Assessment:    Wound Outcome:    Dressing Appearance Moist drainage 08/27/24 0819   Drainage Amount Moderate 08/27/24 0819   Drainage Characteristics/Odor Serosanguineous 08/27/24 0819   Appearance Red;Moist;Granulating;Epithelialization 08/27/24 0819   Tissue loss description Full thickness 08/27/24 0819   Periwound Area Dry;Edematous 08/27/24 0819   Wound Edges Open 08/27/24 0819   Wound Length (cm) 3.4 cm 08/27/24 0819   Wound Width (cm) 2.3 cm 08/27/24 0819   Wound Depth (cm) 0.2 cm 08/27/24 0819   Wound Volume (cm^3) 1.564 cm^3 08/27/24 0819   Wound Surface Area (cm^2) 7.82 cm^2 08/27/24 0819   Care Cleansed with:;Wound cleanser 08/27/24 0819   Dressing Applied 08/27/24 0819   Dressing Change Due 08/30/24 08/27/24 0819     8/23/24             Right lower leg (pre, healed)                           Left lower medial leg (pre)                          Left lower medial leg (post)                                                                                                                                             (Silver alginate, ABD, calamine unna boot, kerlix, coban)    8/27/24    Left lower medial leg (pre)  (silver alginate, ABD, calamine unna boot, kerlix, coban)            Assessment:         ICD-10-CM ICD-9-CM   1. Chronic venous insufficiency  I87.2 459.81   2.  "Venous stasis ulcer of left calf limited to breakdown of skin without varicose veins  I87.2 459.81    L97.221 707.12         Procedures:     Nurse visit only    [] Yes [] No   I & D performed  [] Yes [] No   Excisional debridement performed  [] Yes [] No   Selective debridement performed           [] Yes [] No   Mechanical debridement performed         [] Yes [] No  Silver nitrate applied                                     [] Yes [] No  Labs ordered this visit                                  [] Yes [] No   Imaging ordered this visit                           [] Yes [] No   Tissue pathology and/or culture taken         MEDICATIONS    Current Outpatient Medications:     LASIX 40 mg tablet, Take 40 mg by mouth., Disp: , Rfl:     metoprolol succinate (TOPROL-XL) 25 MG 24 hr tablet, Take 50 mg by mouth 2 (two) times daily., Disp: , Rfl:     potassium chloride SA (K-DUR,KLOR-CON) 20 MEQ tablet, Take 20 mEq by mouth., Disp: , Rfl:     rosuvastatin (CRESTOR) 20 MG tablet, Take 20 mg by mouth every evening., Disp: , Rfl:     tamsulosin (FLOMAX) 0.4 mg Cap, Take 0.4 mg by mouth., Disp: , Rfl:     XARELTO 20 mg Tab, Take 20 mg by mouth., Disp: , Rfl:     collagenase (SANTYL) ointment, Apply topically once daily. Apply nickel thick to wound bed, changing daily (Patient not taking: Reported on 8/21/2024), Disp: 90 g, Rfl: 2    mupirocin (BACTROBAN) 2 % ointment, , Disp: , Rfl:     SSD 1 % cream, , Disp: , Rfl:  Review of patient's allergies indicates:  No Known Allergies    DIAGNOSTICS    Labs/Scans/Micro:    CBC:   Lab Results   Component Value Date    WBC 3.88 (L) 09/11/2023    HGB 12.8 (L) 09/11/2023    HCT 39.7 (L) 09/11/2023    MCV 90.8 09/11/2023     09/11/2023     Sedimentation rate: No results found for: "SEDRATE"     C-reactive protein: No results found for: "CRP"     Chemistry: 3/12/24  Comprehensive Metabolic Panel       Component Ref Range & Units 4 mo ago  (3/12/24)   Sodium 136 - 145 mmol/L 136   Potassium " "3.5 - 5.1 mmol/L 4.2   Chloride 98 - 107 mmol/L 100   CO2 23 - 31 mmol/L 27   Glucose 82 - 115 mg/dL 105   Blood Urea Nitrogen 8.4 - 25.7 mg/dL 21.0   Creatinine 0.73 - 1.18 mg/dL 0.82   Calcium 8.8 - 10.0 mg/dL 9.6   Protein Total 5.8 - 7.6 gm/dL 7.4   Albumin 3.4 - 4.8 g/dL 3.8   Globulin 2.4 - 3.5 gm/dL 3.6 High    Albumin/Globulin Ratio 1.1 - 2.0 ratio 1.1   Bilirubin Total <=1.5 mg/dL 0.5   ALP 40 - 150 unit/L 127   ALT 0 - 55 unit/L 13   AST 5 - 34 unit/L 28   eGFR mls/min/1.73/m2 >60   Resulting Agency  [facility]        HBA1C: No components found for: "HBA1C"    Ankle Brachial Index: 7/11/24 -   Right - 1.34  Left - 1.29    Imaging:    Plain film: No results found for this or any previous visit.    MRI: No results found for this or any previous visit.    Bone Scan: No results found for this or any previous visit.    Vascular studies:    7/11/24 -   Arterial:  FINDINGS:  Ultrasound Doppler and color flow imaging were performed on the arteries of the left lower extremity.  A tri phasic flow wave pattern is evident throughout the visualized arteries of the left lower extremity no significant localized atherosclerotic plaque formation or focal stenosis noted to the visualized arteries of the left lower extremity.  The right ankle brachial index is 1.34 and the left is 1.29.  Impression:  1.  No significant localized atherosclerotic plaque formation or focal stenosis noted to the visualized arteries of theleft lower extremity.    Venous:  FINDINGS:  There is normal compression and flow noted in the  common femoral vein, superficial femoral vein, popliteal vein, and  posterior tibial veinbilaterally.  No evidence of deep venous thrombosis is identified. No significant venous reflux is identified.  The left greater saphenous vein has been surgically harvested.  Impression:  1. No deep venous thrombosis identified to the lower extremities bilaterally.    Microbiology:   7/24/24 - Staph Aureus - Doxy Rx " sent        HOME HEALTH AGENCY:  none  WOUND CARE ORDERS:  Left medial lower leg wound: Cleanse with wound cleanser, apply silver alginate, ABD, zinc unna boot, kerlix, and coban to be changed on Tuesday and Fridays   Right anterior lower leg wound: Keep clean and dry, wear compression socks daily    Follow up in 3 days (on 8/30/2024) for unna boot.

## 2024-08-30 ENCOUNTER — HOSPITAL ENCOUNTER (OUTPATIENT)
Dept: WOUND CARE | Facility: HOSPITAL | Age: 84
Discharge: HOME | End: 2024-08-30
Attending: FAMILY MEDICINE
Payer: MEDICARE

## 2024-08-30 VITALS
WEIGHT: 274.94 LBS | BODY MASS INDEX: 33.48 KG/M2 | RESPIRATION RATE: 18 BRPM | HEIGHT: 76 IN | DIASTOLIC BLOOD PRESSURE: 62 MMHG | SYSTOLIC BLOOD PRESSURE: 124 MMHG | HEART RATE: 109 BPM

## 2024-08-30 DIAGNOSIS — I87.2 CHRONIC VENOUS INSUFFICIENCY: Primary | ICD-10-CM

## 2024-08-30 DIAGNOSIS — L97.221 VENOUS STASIS ULCER OF LEFT CALF LIMITED TO BREAKDOWN OF SKIN WITHOUT VARICOSE VEINS: ICD-10-CM

## 2024-08-30 DIAGNOSIS — I87.2 VENOUS STASIS ULCER OF LEFT CALF LIMITED TO BREAKDOWN OF SKIN WITHOUT VARICOSE VEINS: ICD-10-CM

## 2024-08-30 PROCEDURE — 99213 OFFICE O/P EST LOW 20 MIN: CPT

## 2024-08-30 PROCEDURE — 27000999 HC MEDICAL RECORD PHOTO DOCUMENTATION

## 2024-08-30 PROCEDURE — 29580 STRAPPING UNNA BOOT: CPT

## 2024-08-30 NOTE — PROGRESS NOTES
[facility]: None  Smoker   [] Yes   [x] No   Diabetic  [] Yes  [x] No   Vascular Surgeon: Dr. Carter  Vascular procedures [] Yes [x] No   If so, when and what was done?  Recent Ultrasounds [x] Yes [] No   If so, when were they done? (7/29/24 - venous/arterial)  Compression Pumps  [] Yes [x] No       Right ankle - 26.5 cm     Right calf - 44.5 cm     Left ankle - 25.8 cm     Left calf - 42.4 cm  Doppler done in office? [x] Yes [] No   Date: 7/10/24     Right dorsalis: monophasic     Right posterior: biphasic     Left dorsalis: biphasic     Left posterior: abnormal  Is the patient eligible for a graft, and/or currently grafting? [] Yes [] No    If so, which one/size?      Subjective:       Patient ID: Piter Farrell is a 84 y.o. male.    Chief Complaint: Venous Ulcer ((Left lower medial leg))    HPI    7/10/24 - Mr. Farrell is an 84 year old  male who presents with bilateral lower extremity venous ulcers.  His PCP is Dr. Jesus Hernandez, who referred him to wound clinic.    4/16/24: saw PCP for multiple sores on lower extremities, was given Augmentin 875 mg BID and Bumetanide (no culture was obtained).  4/25/24: saw PCP again for bacterial infection of lower extremities, refilled Augmentin 875 mg BID for 5 days (no culture was obtained)  5/2/24: saw PCP for left leg, was given Silvadine  5/28/24: saw PCP, suggested that he use compression stockings and follow up with his cardiologist (Dr. Carter), he did not do either of these things  7/9/24: saw PCP given Lasix, Cefzil and referred to wound care  He has a history of triple bypass and CHF.   He states that he had venous US done on his legs about 1 month ago with Dr. Carter.  These results were reviewed.  No arterial U/S were performed and the study shows that it was incomplete due to patient resistance.   He is not a diabetic.   He states that he was seen in our clinic about 3 years ago for the same issue.    Today his bilateral lower extremities have multiple  open areas that do appear venous in nature.  Because his venous ultrasounds were in conclusive an incomplete we will be ordering full cardiovascular ultrasounds.  He does have 2+ edema as well as hemosiderin staining.  We would like to compress, however, until we have arterial clearing we are hesitant to do so as this has not been addressed.  We did have a lengthy discussion today regarding his congestive heart failure, the need to compress, the impact that compression may have on his CHF due to moving the fluid from his legs to his chest, as well as the need to verify his arterial status prior to compressing.  He is in agreement with all of this and today we will begin covering the open ulcers and applying a Tubigrip over the Kerlix to hold the dressings in place.  He was instructed that he should alternate short elevation of the legs with dependent hanging of the legs so that blood flow can continue to the feet but at the same time tried to begin offloading some of the legs.  He is ambulatory and therefore would be able to wear an Unna boot wound we are at that point.  He does been the majority of his time at a local skilled nursing facility with his handicapped son.   All 10 toe nails today were quite lengthy and were trimmed for the patient.    7/17/24 - Mr. Farrell is here today for followup on his bilateral lower extremity venous ulcers as well as a small wound to the right 2nd toe.  The right 2nd toe was assessed and remains stable.  The scattered wounds at the lower extremities were selectively debrided.  They are beginning to make progress and there are couple of areas were epithelial tissue is beginning to my great from the edges.  He has completed his p.o. Cefzil.  We are anxious to begin compressing the lower extremities, however, we do not have arterial clearing at this time.  He is scheduled for cardiovascular ultrasounds on 07/29/2024 at 10:00 a.m..  We do have venous ultrasounds on file, however, we do  not have arterial.  Once arterial ultrasounds or clear we will begin compressing using Unna boots.    7/25/24 - The patient returns today for f/up on his bilateral lower extremity venous ulcers.  The wounds have all improved since his last visit on 7/17/24, however there is some slight erythema at the left lower extremity.  A culture was obtained of the left leg.  He is scheduled for bilateral venous/arterial US on 7/29/24. However, he is concerned about using compression and wound like to discuss it with his cardiologist, Dr. Carter.   Once the U/S are done, if there is any venous reflux noted these will be sent to Dr. Carter for evaluation and consultation with the patient prior to compression.     7/31/24 - Mr. Farrell returns today for the bilateral lower extremity venous ulcers.  He did complete his cardiovascular U/S on 7/29/24.  Both his arterial and venous studies were normal.  He also had a wound culture done on 7/25/24 which shows 100% Staph aureus.  He was prescribed Doxycycline today x 10 days.  We will also be authorizing Dalvance infusion for him in treatment of Staph aureus.  We would like to begin compression with Unna Boots, however, the patient would first like to clear that with his cardiologist.  He does have an adequate EF.  He does have an appt with Dr. Carter (cardio) this afternoon at 2:30 to have a halter monitor placed due to his a-fib. He was given a copy of his vascular studies to review at that time and will discus compression. Today the wounds on the left leg were selectively debrided.  Santyl has been ordered and we discussed the cost.  If he is unable to obtain he will continue with Mesalt.     August 7, 2024:    84-year-old white male, she of edema in his legs, and some venous stasis ulcers.  The recent venous ultrasound showed good flow in all of his major veins.  There was no DVT.  His CHF appears to be well controlled.  He will an attempt at compression of both legs.  The venous  stasis ulcers in his legs are somewhat stalled at this time.  There is a left lower medial venous stasis ulcer, with slough present.  It is slightly painful today.    August 9, 2024:  84-year-old white male, who is here for a nurse visit only, to have his dressings changed.  There are no new issues.    August 13, 2024:  84-year-old white male, who is here for follow up of his chronic venous insufficiency, left lower leg venous stasis ulcer.  These wounds are all improving.  The wound on his right lower leg has just about healed.  The edema is much improved with Unna boots.  He does have compression stockings at home, for future needs.    August 16, 2024:  84-year-old white male, who is here for a nurse visit only, to have his Unna boot changed.  There are no new issues.  He has venous stasis ulcers.    8/21/24 - Patient is seen today for a nurse visit only for Unna boot change.  No issues or complaints were brought to this provider's attention during this visit.     August 23, 2024:  84-year-old white male, who is here for follow up of his chronic venous insufficiency, left calf venous stasis ulcer.  He has no open wounds on the right leg.  He will no longer need an Unna boot on the right leg.  The wound on the left calf is improving each week.    8/27/24 - Mr. Farrell is seen today for a nurse visit only for an Unna Boot change for the left venous stasis ulcer.  No issues or concerns were brought to this providers attention during this visit.     8/30/24 - The patient followup on the venous stasis ulcers at his left lower extremity.  Today, that wound was quite macerated.  He does attempt to elevate as much as possible, however, his son is in the nursing facility and so he does been a lot of time there with the legs in a dependent position.  Today, we will change from silver alginate to Polymem with the hopes that that product may hold the moisture a little better.  He will continue with the Unna boots and twice  weekly visits as well.  Of note, compression stockings were ordered on Tuesday 8/27/24, he has not received them yet, we will check on this order. He has blood work and a follow up with Dr. Carter (cardio) in Novemeber    Review of Systems   Constitutional: Negative.    Respiratory: Negative.     Cardiovascular: Negative.    Skin:         As documented in the HPI.   All other systems reviewed and are negative.        Objective:        Vitals:    08/30/24 0750   BP: 124/62   Pulse: 109   Resp: 18         Physical Exam  Vitals and nursing note reviewed.   Constitutional:       Appearance: Normal appearance.   Cardiovascular:      Rate and Rhythm: Normal rate.   Pulmonary:      Effort: Pulmonary effort is normal.   Skin:     General: Skin is warm and dry.      Comments: Venous stasis ulcer on the left calf    Neurological:      Mental Status: He is alert.            Wound 07/10/24 1154 Venous Ulcer Left lower;medial Leg #2 (Active)   07/10/24 1154   Present on Original Admission: Y   Primary Wound Type: Venous ulcer   Side: Left   Orientation: lower;medial   Location: Leg   Wound Approximate Age at First Assessment (Weeks):    Wound Number: #2   Is this injury device related?:    Incision Type:    Closure Method:    Wound Description (Comments):    Type:    Additional Comments:    Ankle-Brachial Index:    Pulses:    Removal Indication and Assessment:    Wound Outcome:    Dressing Appearance Moist drainage 08/30/24 0801   Drainage Amount Moderate 08/30/24 0801   Drainage Characteristics/Odor Serosanguineous 08/30/24 0801   Appearance Red;Slough;Moist;Granulating;Epithelialization 08/30/24 0801   Tissue loss description Full thickness 08/30/24 0801   Periwound Area Moist;Pale white 08/30/24 0801   Wound Edges Open 08/30/24 0801   Wound Length (cm) 3.3 cm 08/30/24 0801   Wound Width (cm) 2.2 cm 08/30/24 0801   Wound Depth (cm) 0.2 cm 08/30/24 0801   Wound Volume (cm^3) 1.452 cm^3 08/30/24 0801   Wound Surface Area (cm^2)  7.26 cm^2 08/30/24 0801   Care Cleansed with:;Wound cleanser 08/30/24 0801   Dressing Applied 08/30/24 0801   Dressing Change Due 09/03/24 08/30/24 0801 8/30/24    Left lower medial leg (pre)  (polymem, ABD, calamine unna boot, kerlix, coban)        Assessment:         ICD-10-CM ICD-9-CM   1. Chronic venous insufficiency  I87.2 459.81   2. Venous stasis ulcer of left calf limited to breakdown of skin without varicose veins  I87.2 459.81    L97.221 707.12           Procedures:     Mechanical debridement only    [] Yes [] No   I & D performed  [] Yes [] No   Excisional debridement performed  [] Yes [] No   Selective debridement performed           [x] Yes [] No   Mechanical debridement performed         [] Yes [] No  Silver nitrate applied                                     [] Yes [] No  Labs ordered this visit                                  [] Yes [] No   Imaging ordered this visit                           [] Yes [] No   Tissue pathology and/or culture taken         MEDICATIONS    Current Outpatient Medications:     LASIX 40 mg tablet, Take 40 mg by mouth., Disp: , Rfl:     metoprolol succinate (TOPROL-XL) 25 MG 24 hr tablet, Take 50 mg by mouth 2 (two) times daily., Disp: , Rfl:     potassium chloride SA (K-DUR,KLOR-CON) 20 MEQ tablet, Take 20 mEq by mouth., Disp: , Rfl:     rosuvastatin (CRESTOR) 20 MG tablet, Take 20 mg by mouth every evening., Disp: , Rfl:     tamsulosin (FLOMAX) 0.4 mg Cap, Take 0.4 mg by mouth., Disp: , Rfl:     XARELTO 20 mg Tab, Take 20 mg by mouth., Disp: , Rfl:     collagenase (SANTYL) ointment, Apply topically once daily. Apply nickel thick to wound bed, changing daily (Patient not taking: Reported on 8/30/2024), Disp: 90 g, Rfl: 2    mupirocin (BACTROBAN) 2 % ointment, , Disp: , Rfl:     SSD 1 % cream, , Disp: , Rfl:  Review of patient's allergies indicates:  No Known Allergies    DIAGNOSTICS    Labs/Scans/Micro:    CBC:   Lab Results   Component Value Date    WBC 3.88 (L)  "09/11/2023    HGB 12.8 (L) 09/11/2023    HCT 39.7 (L) 09/11/2023    MCV 90.8 09/11/2023     09/11/2023     Sedimentation rate: No results found for: "SEDRATE"     C-reactive protein: No results found for: "CRP"     Chemistry: 3/12/24  Comprehensive Metabolic Panel       Component Ref Range & Units 4 mo ago  (3/12/24)   Sodium 136 - 145 mmol/L 136   Potassium 3.5 - 5.1 mmol/L 4.2   Chloride 98 - 107 mmol/L 100   CO2 23 - 31 mmol/L 27   Glucose 82 - 115 mg/dL 105   Blood Urea Nitrogen 8.4 - 25.7 mg/dL 21.0   Creatinine 0.73 - 1.18 mg/dL 0.82   Calcium 8.8 - 10.0 mg/dL 9.6   Protein Total 5.8 - 7.6 gm/dL 7.4   Albumin 3.4 - 4.8 g/dL 3.8   Globulin 2.4 - 3.5 gm/dL 3.6 High    Albumin/Globulin Ratio 1.1 - 2.0 ratio 1.1   Bilirubin Total <=1.5 mg/dL 0.5   ALP 40 - 150 unit/L 127   ALT 0 - 55 unit/L 13   AST 5 - 34 unit/L 28   eGFR mls/min/1.73/m2 >60   Resulting Agency  [facility]        HBA1C: No components found for: "HBA1C"    Ankle Brachial Index: 7/11/24 -   Right - 1.34  Left - 1.29    Imaging:    Plain film: No results found for this or any previous visit.    MRI: No results found for this or any previous visit.    Bone Scan: No results found for this or any previous visit.    Vascular studies:    7/11/24 -   Arterial:  FINDINGS:  Ultrasound Doppler and color flow imaging were performed on the arteries of the left lower extremity.  A tri phasic flow wave pattern is evident throughout the visualized arteries of the left lower extremity no significant localized atherosclerotic plaque formation or focal stenosis noted to the visualized arteries of the left lower extremity.  The right ankle brachial index is 1.34 and the left is 1.29.  Impression:  1.  No significant localized atherosclerotic plaque formation or focal stenosis noted to the visualized arteries of theleft lower extremity.    Venous:  FINDINGS:  There is normal compression and flow noted in the  common femoral vein, superficial femoral vein, " popliteal vein, and  posterior tibial veinbilaterally.  No evidence of deep venous thrombosis is identified. No significant venous reflux is identified.  The left greater saphenous vein has been surgically harvested.  Impression:  1. No deep venous thrombosis identified to the lower extremities bilaterally.    Microbiology:   7/24/24 - Staph Aureus - Doxy Rx sent        HOME HEALTH AGENCY:  none  WOUND CARE ORDERS:  Left medial lower leg wound: Cleanse with wound cleanser, apply polymem, ABD, zinc unna boot, kerlix, and coban to be changed on Tuesday and Fridays   Right anterior lower leg wound: Keep clean and dry, wear compression socks daily    Follow up in 4 days (on 9/3/2024) for unna boot/nurse visit.

## 2024-09-03 ENCOUNTER — HOSPITAL ENCOUNTER (OUTPATIENT)
Dept: WOUND CARE | Facility: HOSPITAL | Age: 84
Discharge: HOME | End: 2024-09-03
Attending: NURSE PRACTITIONER
Payer: MEDICARE

## 2024-09-03 VITALS
HEIGHT: 76 IN | BODY MASS INDEX: 33.48 KG/M2 | HEART RATE: 102 BPM | DIASTOLIC BLOOD PRESSURE: 81 MMHG | RESPIRATION RATE: 18 BRPM | SYSTOLIC BLOOD PRESSURE: 120 MMHG | WEIGHT: 274.94 LBS

## 2024-09-03 DIAGNOSIS — I87.2 CHRONIC VENOUS INSUFFICIENCY: Primary | ICD-10-CM

## 2024-09-03 DIAGNOSIS — I87.2 VENOUS STASIS ULCER OF LEFT CALF LIMITED TO BREAKDOWN OF SKIN WITHOUT VARICOSE VEINS: ICD-10-CM

## 2024-09-03 DIAGNOSIS — L97.221 VENOUS STASIS ULCER OF LEFT CALF LIMITED TO BREAKDOWN OF SKIN WITHOUT VARICOSE VEINS: ICD-10-CM

## 2024-09-03 PROCEDURE — 29580 STRAPPING UNNA BOOT: CPT

## 2024-09-03 PROCEDURE — 27000999 HC MEDICAL RECORD PHOTO DOCUMENTATION

## 2024-09-03 PROCEDURE — 99211 OFF/OP EST MAY X REQ PHY/QHP: CPT | Mod: 25

## 2024-09-03 NOTE — PROGRESS NOTES
[facility]: None  Smoker   [] Yes   [x] No   Diabetic  [] Yes  [x] No   Vascular Surgeon: Dr. Carter  Vascular procedures [] Yes [x] No   If so, when and what was done?  Recent Ultrasounds [x] Yes [] No   If so, when were they done? (7/29/24 - venous/arterial)  Compression Pumps  [] Yes [x] No       Right ankle - 26.5 cm     Right calf - 44 cm     Left ankle - 26 cm     Left calf - 42.3 cm  Doppler done in office? [x] Yes [] No   Date: 7/10/24     Right dorsalis: monophasic     Right posterior: biphasic     Left dorsalis: biphasic     Left posterior: abnormal  Is the patient eligible for a graft, and/or currently grafting? [] Yes [] No    If so, which one/size?      Subjective:       Patient ID: Piter Farrell is a 84 y.o. male.    Chief Complaint: Venous Ulcer ((Left lower medial leg))    HPI    7/10/24 - Mr. Farrell is an 84 year old  male who presents with bilateral lower extremity venous ulcers.  His PCP is Dr. Jesus Hernandez, who referred him to wound clinic.    4/16/24: saw PCP for multiple sores on lower extremities, was given Augmentin 875 mg BID and Bumetanide (no culture was obtained).  4/25/24: saw PCP again for bacterial infection of lower extremities, refilled Augmentin 875 mg BID for 5 days (no culture was obtained)  5/2/24: saw PCP for left leg, was given Silvadine  5/28/24: saw PCP, suggested that he use compression stockings and follow up with his cardiologist (Dr. Carter), he did not do either of these things  7/9/24: saw PCP given Lasix, Cefzil and referred to wound care  He has a history of triple bypass and CHF.   He states that he had venous US done on his legs about 1 month ago with Dr. Carter.  These results were reviewed.  No arterial U/S were performed and the study shows that it was incomplete due to patient resistance.   He is not a diabetic.   He states that he was seen in our clinic about 3 years ago for the same issue.    Today his bilateral lower extremities have multiple open  areas that do appear venous in nature.  Because his venous ultrasounds were in conclusive an incomplete we will be ordering full cardiovascular ultrasounds.  He does have 2+ edema as well as hemosiderin staining.  We would like to compress, however, until we have arterial clearing we are hesitant to do so as this has not been addressed.  We did have a lengthy discussion today regarding his congestive heart failure, the need to compress, the impact that compression may have on his CHF due to moving the fluid from his legs to his chest, as well as the need to verify his arterial status prior to compressing.  He is in agreement with all of this and today we will begin covering the open ulcers and applying a Tubigrip over the Kerlix to hold the dressings in place.  He was instructed that he should alternate short elevation of the legs with dependent hanging of the legs so that blood flow can continue to the feet but at the same time tried to begin offloading some of the legs.  He is ambulatory and therefore would be able to wear an Unna boot wound we are at that point.  He does been the majority of his time at a local skilled nursing facility with his handicapped son.   All 10 toe nails today were quite lengthy and were trimmed for the patient.    7/17/24 - Mr. Farrell is here today for followup on his bilateral lower extremity venous ulcers as well as a small wound to the right 2nd toe.  The right 2nd toe was assessed and remains stable.  The scattered wounds at the lower extremities were selectively debrided.  They are beginning to make progress and there are couple of areas were epithelial tissue is beginning to my great from the edges.  He has completed his p.o. Cefzil.  We are anxious to begin compressing the lower extremities, however, we do not have arterial clearing at this time.  He is scheduled for cardiovascular ultrasounds on 07/29/2024 at 10:00 a.m..  We do have venous ultrasounds on file, however, we do not  have arterial.  Once arterial ultrasounds or clear we will begin compressing using Unna boots.    7/25/24 - The patient returns today for f/up on his bilateral lower extremity venous ulcers.  The wounds have all improved since his last visit on 7/17/24, however there is some slight erythema at the left lower extremity.  A culture was obtained of the left leg.  He is scheduled for bilateral venous/arterial US on 7/29/24. However, he is concerned about using compression and wound like to discuss it with his cardiologist, Dr. Carter.   Once the U/S are done, if there is any venous reflux noted these will be sent to Dr. Carter for evaluation and consultation with the patient prior to compression.     7/31/24 - Mr. Farrell returns today for the bilateral lower extremity venous ulcers.  He did complete his cardiovascular U/S on 7/29/24.  Both his arterial and venous studies were normal.  He also had a wound culture done on 7/25/24 which shows 100% Staph aureus.  He was prescribed Doxycycline today x 10 days.  We will also be authorizing Dalvance infusion for him in treatment of Staph aureus.  We would like to begin compression with Unna Boots, however, the patient would first like to clear that with his cardiologist.  He does have an adequate EF.  He does have an appt with Dr. Carter (cardio) this afternoon at 2:30 to have a halter monitor placed due to his a-fib. He was given a copy of his vascular studies to review at that time and will discus compression. Today the wounds on the left leg were selectively debrided.  Santyl has been ordered and we discussed the cost.  If he is unable to obtain he will continue with Mesalt.     August 7, 2024:    84-year-old white male, she of edema in his legs, and some venous stasis ulcers.  The recent venous ultrasound showed good flow in all of his major veins.  There was no DVT.  His CHF appears to be well controlled.  He will an attempt at compression of both legs.  The venous stasis  ulcers in his legs are somewhat stalled at this time.  There is a left lower medial venous stasis ulcer, with slough present.  It is slightly painful today.    August 9, 2024:  84-year-old white male, who is here for a nurse visit only, to have his dressings changed.  There are no new issues.    August 13, 2024:  84-year-old white male, who is here for follow up of his chronic venous insufficiency, left lower leg venous stasis ulcer.  These wounds are all improving.  The wound on his right lower leg has just about healed.  The edema is much improved with Unna boots.  He does have compression stockings at home, for future needs.    August 16, 2024:  84-year-old white male, who is here for a nurse visit only, to have his Unna boot changed.  There are no new issues.  He has venous stasis ulcers.    8/21/24 - Patient is seen today for a nurse visit only for Unna boot change.  No issues or complaints were brought to this provider's attention during this visit.     August 23, 2024:  84-year-old white male, who is here for follow up of his chronic venous insufficiency, left calf venous stasis ulcer.  He has no open wounds on the right leg.  He will no longer need an Unna boot on the right leg.  The wound on the left calf is improving each week.    8/27/24 - Mr. Farrell is seen today for a nurse visit only for an Unna Boot change for the left venous stasis ulcer.  No issues or concerns were brought to this providers attention during this visit.     8/30/24 - The patient followup on the venous stasis ulcers at his left lower extremity.  Today, that wound was quite macerated.  He does attempt to elevate as much as possible, however, his son is in the nursing facility and so he does been a lot of time there with the legs in a dependent position.  Today, we will change from silver alginate to Polymem with the hopes that that product may hold the moisture a little better.  He will continue with the Unna boots and twice weekly  visits as well.  Of note, compression stockings were ordered on Tuesday 8/27/24, he has not received them yet, we will check on this order. He has blood work and a follow up with Dr. Carter (cardio) in Novemeber    9/3/24 - The patient is seen today for a nurse visit only for an Unna Boot change for the left venous stasis ulcer.  No issues or concerns were brought to this providers attention during this visit.     Review of Systems   Constitutional: Negative.    Respiratory: Negative.     Cardiovascular: Negative.    Skin:         As documented in the HPI.   All other systems reviewed and are negative.        Objective:        Vitals:    09/03/24 0920   BP: 120/81   Pulse: 102   Resp: 18         Physical Exam  Vitals and nursing note reviewed.   Constitutional:       Appearance: Normal appearance.   Cardiovascular:      Rate and Rhythm: Normal rate.   Pulmonary:      Effort: Pulmonary effort is normal.   Skin:     General: Skin is warm and dry.      Comments: Venous stasis ulcer on the left calf    Neurological:      Mental Status: He is alert.            Wound 07/10/24 1154 Venous Ulcer Left lower;medial Leg #2 (Active)   07/10/24 1154   Present on Original Admission: Y   Primary Wound Type: Venous ulcer   Side: Left   Orientation: lower;medial   Location: Leg   Wound Approximate Age at First Assessment (Weeks):    Wound Number: #2   Is this injury device related?:    Incision Type:    Closure Method:    Wound Description (Comments):    Type:    Additional Comments:    Ankle-Brachial Index:    Pulses:    Removal Indication and Assessment:    Wound Outcome:    Dressing Appearance Moist drainage 09/03/24 0920   Drainage Amount Moderate 09/03/24 0920   Drainage Characteristics/Odor Serosanguineous 09/03/24 0920   Appearance Red;Moist;Granulating 09/03/24 0920   Tissue loss description Full thickness 09/03/24 0920   Periwound Area Moist;Pale white;Edematous 09/03/24 0920   Wound Edges Open 09/03/24 0920   Wound Length  (cm) 2.5 cm 09/03/24 0920   Wound Width (cm) 2 cm 09/03/24 0920   Wound Depth (cm) 0.2 cm 09/03/24 0920   Wound Volume (cm^3) 1 cm^3 09/03/24 0920   Wound Surface Area (cm^2) 5 cm^2 09/03/24 0920   Care Cleansed with:;Wound cleanser 09/03/24 0920   Dressing Applied 09/03/24 0920   Dressing Change Due 09/06/24 09/03/24 0920 8/30/24    Left lower medial leg (pre)  (polymem, ABD, calamine unna boot, kerlix, coban)    9/3/24    Left lower medial leg (pr3)  (polymem, calamine unna boot, kerlix, coban)        Assessment:         ICD-10-CM ICD-9-CM   1. Chronic venous insufficiency  I87.2 459.81   2. Venous stasis ulcer of left calf limited to breakdown of skin without varicose veins  I87.2 459.81    L97.221 707.12             Procedures:     Nurse visit only    [] Yes [] No   I & D performed  [] Yes [] No   Excisional debridement performed  [] Yes [] No   Selective debridement performed           [] Yes [] No   Mechanical debridement performed         [] Yes [] No  Silver nitrate applied                                     [] Yes [] No  Labs ordered this visit                                  [] Yes [] No   Imaging ordered this visit                           [] Yes [] No   Tissue pathology and/or culture taken         MEDICATIONS    Current Outpatient Medications:     LASIX 40 mg tablet, Take 40 mg by mouth., Disp: , Rfl:     metoprolol succinate (TOPROL-XL) 25 MG 24 hr tablet, Take 50 mg by mouth 2 (two) times daily., Disp: , Rfl:     potassium chloride SA (K-DUR,KLOR-CON) 20 MEQ tablet, Take 20 mEq by mouth., Disp: , Rfl:     rosuvastatin (CRESTOR) 20 MG tablet, Take 20 mg by mouth every evening., Disp: , Rfl:     tamsulosin (FLOMAX) 0.4 mg Cap, Take 0.4 mg by mouth., Disp: , Rfl:     XARELTO 20 mg Tab, Take 20 mg by mouth., Disp: , Rfl:     mupirocin (BACTROBAN) 2 % ointment, , Disp: , Rfl:     SSD 1 % cream, , Disp: , Rfl:  Review of patient's allergies indicates:  No Known  "Allergies    DIAGNOSTICS    Labs/Scans/Micro:    CBC:   Lab Results   Component Value Date    WBC 3.88 (L) 09/11/2023    HGB 12.8 (L) 09/11/2023    HCT 39.7 (L) 09/11/2023    MCV 90.8 09/11/2023     09/11/2023     Sedimentation rate: No results found for: "SEDRATE"     C-reactive protein: No results found for: "CRP"     Chemistry: 3/12/24  Comprehensive Metabolic Panel       Component Ref Range & Units 4 mo ago  (3/12/24)   Sodium 136 - 145 mmol/L 136   Potassium 3.5 - 5.1 mmol/L 4.2   Chloride 98 - 107 mmol/L 100   CO2 23 - 31 mmol/L 27   Glucose 82 - 115 mg/dL 105   Blood Urea Nitrogen 8.4 - 25.7 mg/dL 21.0   Creatinine 0.73 - 1.18 mg/dL 0.82   Calcium 8.8 - 10.0 mg/dL 9.6   Protein Total 5.8 - 7.6 gm/dL 7.4   Albumin 3.4 - 4.8 g/dL 3.8   Globulin 2.4 - 3.5 gm/dL 3.6 High    Albumin/Globulin Ratio 1.1 - 2.0 ratio 1.1   Bilirubin Total <=1.5 mg/dL 0.5   ALP 40 - 150 unit/L 127   ALT 0 - 55 unit/L 13   AST 5 - 34 unit/L 28   eGFR mls/min/1.73/m2 >60   [facility]ing [facility]        HBA1C: No components found for: "HBA1C"    Ankle Brachial Index: 7/11/24 -   Right - 1.34  Left - 1.29    Imaging:    Plain film: No results found for this or any previous visit.    MRI: No results found for this or any previous visit.    Bone Scan: No results found for this or any previous visit.    Vascular studies:    7/11/24 -   Arterial:  FINDINGS:  Ultrasound Doppler and color flow imaging were performed on the arteries of the left lower extremity.  A tri phasic flow wave pattern is evident throughout the visualized arteries of the left lower extremity no significant localized atherosclerotic plaque formation or focal stenosis noted to the visualized arteries of the left lower extremity.  The right ankle brachial index is 1.34 and the left is 1.29.  Impression:  1.  No significant localized atherosclerotic plaque formation or focal stenosis noted to the visualized arteries of theleft lower " extremity.    Venous:  FINDINGS:  There is normal compression and flow noted in the  common femoral vein, superficial femoral vein, popliteal vein, and  posterior tibial veinbilaterally.  No evidence of deep venous thrombosis is identified. No significant venous reflux is identified.  The left greater saphenous vein has been surgically harvested.  Impression:  1. No deep venous thrombosis identified to the lower extremities bilaterally.    Microbiology:   7/24/24 - Staph Aureus - Doxy Rx sent        HOME HEALTH AGENCY:  none  WOUND CARE ORDERS:  Left medial lower leg wound: Cleanse with wound cleanser, apply polymem, ABD, zinc unna boot, kerlix, and coban to be changed on Tuesday and Fridays   Right anterior lower leg wound: Keep clean and dry, wear compression socks daily    Follow up in 3 days (on 9/6/2024) for left leg.

## 2024-09-06 ENCOUNTER — HOSPITAL ENCOUNTER (OUTPATIENT)
Dept: WOUND CARE | Facility: HOSPITAL | Age: 84
Discharge: HOME | End: 2024-09-06
Attending: FAMILY MEDICINE
Payer: MEDICARE

## 2024-09-06 VITALS
HEART RATE: 108 BPM | WEIGHT: 274.94 LBS | BODY MASS INDEX: 33.48 KG/M2 | DIASTOLIC BLOOD PRESSURE: 66 MMHG | RESPIRATION RATE: 18 BRPM | SYSTOLIC BLOOD PRESSURE: 126 MMHG | HEIGHT: 76 IN

## 2024-09-06 DIAGNOSIS — I87.2 CHRONIC VENOUS INSUFFICIENCY: Primary | ICD-10-CM

## 2024-09-06 DIAGNOSIS — I87.2 VENOUS STASIS ULCER OF LEFT CALF LIMITED TO BREAKDOWN OF SKIN WITHOUT VARICOSE VEINS: ICD-10-CM

## 2024-09-06 DIAGNOSIS — L97.221 VENOUS STASIS ULCER OF LEFT CALF LIMITED TO BREAKDOWN OF SKIN WITHOUT VARICOSE VEINS: ICD-10-CM

## 2024-09-06 PROCEDURE — 27000999 HC MEDICAL RECORD PHOTO DOCUMENTATION

## 2024-09-06 PROCEDURE — 29580 STRAPPING UNNA BOOT: CPT

## 2024-09-06 PROCEDURE — 99213 OFFICE O/P EST LOW 20 MIN: CPT | Mod: 25

## 2024-09-06 NOTE — PROGRESS NOTES
Home Health: None  Smoker   [] Yes   [x] No   Diabetic  [] Yes  [x] No   Vascular Surgeon: Dr. Carter  Vascular procedures [] Yes [x] No   If so, when and what was done?  Recent Ultrasounds [x] Yes [] No   If so, when were they done? (7/29/24 - venous/arterial)  Compression Pumps  [] Yes [x] No       Right ankle - 26.5 cm     Right calf - 44 cm     Left ankle - 26 cm     Left calf - 42.3 cm  Doppler done in office? [x] Yes [] No   Date: 7/10/24     Right dorsalis: monophasic     Right posterior: biphasic     Left dorsalis: biphasic     Left posterior: abnormal  Is the patient eligible for a graft, and/or currently grafting? [] Yes [] No    If so, which one/size?      Subjective:       Patient ID: Piter Farrell is a 84 y.o. male.    Chief Complaint: Venous Ulcer (Left medial lower leg )    HPI    September 6, 2024:  84-year-old white male, who is here for follow up of his left venous stasis ulcer, chronic venous insufficiency.  The venous stasis ulcer is improving, with regard to measurements.  He has been using Polymem.  There still some maceration around the edges.  He is using boot on the left leg twice a week.    7/10/24 - Mr. Farrell is an 84 year old  male who presents with bilateral lower extremity venous ulcers.  His PCP is Dr. Jesus Hernandez, who referred him to wound clinic.    4/16/24: saw PCP for multiple sores on lower extremities, was given Augmentin 875 mg BID and Bumetanide (no culture was obtained).  4/25/24: saw PCP again for bacterial infection of lower extremities, refilled Augmentin 875 mg BID for 5 days (no culture was obtained)  5/2/24: saw PCP for left leg, was given Silvadine  5/28/24: saw PCP, suggested that he use compression stockings and follow up with his cardiologist (Dr. Carter), he did not do either of these things  7/9/24: saw PCP given Lasix, Cefzil and referred to wound care  He has a history of triple bypass and CHF.   He states that he had venous US done on his legs about 1  month ago with Dr. Carter.  These results were reviewed.  No arterial U/S were performed and the study shows that it was incomplete due to patient resistance.   He is not a diabetic.   He states that he was seen in our clinic about 3 years ago for the same issue.    Today his bilateral lower extremities have multiple open areas that do appear venous in nature.  Because his venous ultrasounds were in conclusive an incomplete we will be ordering full cardiovascular ultrasounds.  He does have 2+ edema as well as hemosiderin staining.  We would like to compress, however, until we have arterial clearing we are hesitant to do so as this has not been addressed.  We did have a lengthy discussion today regarding his congestive heart failure, the need to compress, the impact that compression may have on his CHF due to moving the fluid from his legs to his chest, as well as the need to verify his arterial status prior to compressing.  He is in agreement with all of this and today we will begin covering the open ulcers and applying a Tubigrip over the Kerlix to hold the dressings in place.  He was instructed that he should alternate short elevation of the legs with dependent hanging of the legs so that blood flow can continue to the feet but at the same time tried to begin offloading some of the legs.  He is ambulatory and therefore would be able to wear an Unna boot wound we are at that point.  He does been the majority of his time at a local skilled nursing facility with his handicapped son.   All 10 toe nails today were quite lengthy and were trimmed for the patient.    7/17/24 - Mr. Farrell is here today for followup on his bilateral lower extremity venous ulcers as well as a small wound to the right 2nd toe.  The right 2nd toe was assessed and remains stable.  The scattered wounds at the lower extremities were selectively debrided.  They are beginning to make progress and there are couple of areas were epithelial tissue is  beginning to my great from the edges.  He has completed his p.o. Cefzil.  We are anxious to begin compressing the lower extremities, however, we do not have arterial clearing at this time.  He is scheduled for cardiovascular ultrasounds on 07/29/2024 at 10:00 a.m..  We do have venous ultrasounds on file, however, we do not have arterial.  Once arterial ultrasounds or clear we will begin compressing using Unna boots.    7/25/24 - The patient returns today for f/up on his bilateral lower extremity venous ulcers.  The wounds have all improved since his last visit on 7/17/24, however there is some slight erythema at the left lower extremity.  A culture was obtained of the left leg.  He is scheduled for bilateral venous/arterial US on 7/29/24. However, he is concerned about using compression and wound like to discuss it with his cardiologist, Dr. Carter.   Once the U/S are done, if there is any venous reflux noted these will be sent to Dr. Carter for evaluation and consultation with the patient prior to compression.     7/31/24 - Mr. Farrell returns today for the bilateral lower extremity venous ulcers.  He did complete his cardiovascular U/S on 7/29/24.  Both his arterial and venous studies were normal.  He also had a wound culture done on 7/25/24 which shows 100% Staph aureus.  He was prescribed Doxycycline today x 10 days.  We will also be authorizing Dalvance infusion for him in treatment of Staph aureus.  We would like to begin compression with Unna Boots, however, the patient would first like to clear that with his cardiologist.  He does have an adequate EF.  He does have an appt with Dr. Carter (cardio) this afternoon at 2:30 to have a halter monitor placed due to his a-fib. He was given a copy of his vascular studies to review at that time and will discus compression. Today the wounds on the left leg were selectively debrided.  Santyl has been ordered and we discussed the cost.  If he is unable to obtain he will  continue with Mesalt.     August 7, 2024:    84-year-old white male, she of edema in his legs, and some venous stasis ulcers.  The recent venous ultrasound showed good flow in all of his major veins.  There was no DVT.  His CHF appears to be well controlled.  He will an attempt at compression of both legs.  The venous stasis ulcers in his legs are somewhat stalled at this time.  There is a left lower medial venous stasis ulcer, with slough present.  It is slightly painful today.    August 9, 2024:  84-year-old white male, who is here for a nurse visit only, to have his dressings changed.  There are no new issues.    August 13, 2024:  84-year-old white male, who is here for follow up of his chronic venous insufficiency, left lower leg venous stasis ulcer.  These wounds are all improving.  The wound on his right lower leg has just about healed.  The edema is much improved with Unna boots.  He does have compression stockings at home, for future needs.    August 16, 2024:  84-year-old white male, who is here for a nurse visit only, to have his Unna boot changed.  There are no new issues.  He has venous stasis ulcers.    8/21/24 - Patient is seen today for a nurse visit only for Unna boot change.  No issues or complaints were brought to this provider's attention during this visit.     August 23, 2024:  84-year-old white male, who is here for follow up of his chronic venous insufficiency, left calf venous stasis ulcer.  He has no open wounds on the right leg.  He will no longer need an Unna boot on the right leg.  The wound on the left calf is improving each week.    8/27/24 - Mr. Farrell is seen today for a nurse visit only for an Unna Boot change for the left venous stasis ulcer.  No issues or concerns were brought to this providers attention during this visit.     8/30/24 - The patient followup on the venous stasis ulcers at his left lower extremity.  Today, that wound was quite macerated.  He does attempt to elevate as  much as possible, however, his son is in the nursing facility and so he does been a lot of time there with the legs in a dependent position.  Today, we will change from silver alginate to Polymem with the hopes that that product may hold the moisture a little better.  He will continue with the Unna boots and twice weekly visits as well.  Of note, compression stockings were ordered on Tuesday 8/27/24, he has not received them yet, we will check on this order. He has blood work and a follow up with Dr. Carter (cardio) in [facility]    9/3/24 - The patient is seen today for a nurse visit only for an Unna Boot change for the left venous stasis ulcer.  No issues or concerns were brought to this providers attention during this visit.     Review of Systems   Constitutional: Negative.    Respiratory: Negative.     Cardiovascular: Negative.    Skin:         As documented in the HPI.   All other systems reviewed and are negative.        Objective:        Vitals:    09/06/24 0817   BP: 126/66   Pulse: 108   Resp: 18         Physical Exam  Vitals and nursing note reviewed.   Constitutional:       Appearance: Normal appearance.   Cardiovascular:      Rate and Rhythm: Normal rate.   Pulmonary:      Effort: Pulmonary effort is normal.   Skin:     General: Skin is warm and dry.      Comments: There is a venous stasis ulcer of the left lower leg, which has improved in size.  There still some maceration around the edges.  I did a mechanical debridement only.  There is no slough.   Neurological:      Mental Status: He is alert.            Wound 07/10/24 1154 Venous Ulcer Left lower;medial Leg #2 (Active)   07/10/24 1154   Present on Original Admission: Y   Primary Wound Type: Venous ulcer   Side: Left   Orientation: lower;medial   Location: Leg   Wound Approximate Age at First Assessment (Weeks):    Wound Number: #2   Is this injury device related?:    Incision Type:    Closure Method:    Wound Description (Comments):    Type:     Additional Comments:    Ankle-Brachial Index:    Pulses:    Removal Indication and Assessment:    Wound Outcome:    Dressing Appearance Moist drainage 09/06/24 0819   Drainage Amount Moderate 09/06/24 0819   Drainage Characteristics/Odor Serosanguineous 09/06/24 0819   Appearance Moist;Red;Granulating 09/06/24 0819   Tissue loss description Full thickness 09/06/24 0819   Periwound Area Intact 09/06/24 0819   Wound Edges Open 09/06/24 0819   Wound Length (cm) 2.2 cm 09/06/24 0819   Wound Width (cm) 1.7 cm 09/06/24 0819   Wound Depth (cm) 0.2 cm 09/06/24 0819   Wound Volume (cm^3) 0.748 cm^3 09/06/24 0819   Wound Surface Area (cm^2) 3.74 cm^2 09/06/24 0819   Care Cleansed with:;Wound cleanser 09/06/24 0819   Dressing Applied;Other (comment) 09/06/24 0819   Dressing Change Due 09/10/24 09/06/24 0819     9/6/24    Left medial lower leg   Polymem, calamine unna boot, kerlix, coban   CT        Assessment:         ICD-10-CM ICD-9-CM   1. Chronic venous insufficiency  I87.2 459.81   2. Venous stasis ulcer of left calf limited to breakdown of skin without varicose veins  I87.2 459.81    L97.221 707.12         Procedures:     Mechanical debridement     [] Yes [] No   I & D performed  [] Yes [] No   Excisional debridement performed  [] Yes [] No   Selective debridement performed           [x] Yes [] No   Mechanical debridement performed         [] Yes [] No  Silver nitrate applied                                     [] Yes [] No  Labs ordered this visit                                  [] Yes [] No   Imaging ordered this visit                           [] Yes [] No   Tissue pathology and/or culture taken         MEDICATIONS    Current Outpatient Medications:     LASIX 40 mg tablet, Take 40 mg by mouth., Disp: , Rfl:     metoprolol succinate (TOPROL-XL) 25 MG 24 hr tablet, Take 50 mg by mouth 2 (two) times daily., Disp: , Rfl:     mupirocin (BACTROBAN) 2 % ointment, , Disp: , Rfl:     potassium chloride SA (K-DUR,KLOR-CON) 20  "MEQ tablet, Take 20 mEq by mouth., Disp: , Rfl:     rosuvastatin (CRESTOR) 20 MG tablet, Take 20 mg by mouth every evening., Disp: , Rfl:     SSD 1 % cream, , Disp: , Rfl:     tamsulosin (FLOMAX) 0.4 mg Cap, Take 0.4 mg by mouth., Disp: , Rfl:     XARELTO 20 mg Tab, Take 20 mg by mouth., Disp: , Rfl:  Review of patient's allergies indicates:  No Known Allergies    DIAGNOSTICS    Labs/Scans/Micro:    CBC:   Lab Results   Component Value Date    WBC 3.88 (L) 09/11/2023    HGB 12.8 (L) 09/11/2023    HCT 39.7 (L) 09/11/2023    MCV 90.8 09/11/2023     09/11/2023     Sedimentation rate: No results found for: "SEDRATE"     C-reactive protein: No results found for: "CRP"     Chemistry: 3/12/24  Comprehensive Metabolic Panel       Component Ref Range & Units 4 mo ago  (3/12/24)   Sodium 136 - 145 mmol/L 136   Potassium 3.5 - 5.1 mmol/L 4.2   Chloride 98 - 107 mmol/L 100   CO2 23 - 31 mmol/L 27   Glucose 82 - 115 mg/dL 105   Blood Urea Nitrogen 8.4 - 25.7 mg/dL 21.0   Creatinine 0.73 - 1.18 mg/dL 0.82   Calcium 8.8 - 10.0 mg/dL 9.6   Protein Total 5.8 - 7.6 gm/dL 7.4   Albumin 3.4 - 4.8 g/dL 3.8   Globulin 2.4 - 3.5 gm/dL 3.6 High    Albumin/Globulin Ratio 1.1 - 2.0 ratio 1.1   Bilirubin Total <=1.5 mg/dL 0.5   ALP 40 - 150 unit/L 127   ALT 0 - 55 unit/L 13   AST 5 - 34 unit/L 28   eGFR mls/min/1.73/m2 >60   Resulting Agency  [facility]        HBA1C: No components found for: "HBA1C"    Ankle Brachial Index: 7/11/24 -   Right - 1.34  Left - 1.29    Imaging:    Plain film: No results found for this or any previous visit.    MRI: No results found for this or any previous visit.    Bone Scan: No results found for this or any previous visit.    Vascular studies:    7/11/24 -   Arterial:  FINDINGS:  Ultrasound Doppler and color flow imaging were performed on the arteries of the left lower extremity.  A tri phasic flow wave pattern is evident throughout the visualized arteries of the left lower extremity no significant " localized atherosclerotic plaque formation or focal stenosis noted to the visualized arteries of the left lower extremity.  The right ankle brachial index is 1.34 and the left is 1.29.  Impression:  1.  No significant localized atherosclerotic plaque formation or focal stenosis noted to the visualized arteries of theleft lower extremity.    Venous:  FINDINGS:  There is normal compression and flow noted in the  common femoral vein, superficial femoral vein, popliteal vein, and  posterior tibial veinbilaterally.  No evidence of deep venous thrombosis is identified. No significant venous reflux is identified.  The left greater saphenous vein has been surgically harvested.  Impression:  1. No deep venous thrombosis identified to the lower extremities bilaterally.    Microbiology:   7/24/24 - Staph Aureus - Doxy Rx sent        HOME HEALTH AGENCY:  none  WOUND CARE ORDERS:  Left medial lower leg wound: Cleanse with wound cleanser, apply polymem, calamine unna boot, kerlix, and coban to be changed on Tuesday and Fridays   Right anterior lower leg wound: Keep clean and dry, wear compression socks daily    Follow up in 4 days (on 9/10/2024) for Nurse visit - left leg .

## 2024-09-10 ENCOUNTER — HOSPITAL ENCOUNTER (OUTPATIENT)
Dept: WOUND CARE | Facility: HOSPITAL | Age: 84
Discharge: HOME | End: 2024-09-10
Attending: FAMILY MEDICINE
Payer: MEDICARE

## 2024-09-10 VITALS
DIASTOLIC BLOOD PRESSURE: 58 MMHG | WEIGHT: 274.94 LBS | SYSTOLIC BLOOD PRESSURE: 122 MMHG | RESPIRATION RATE: 18 BRPM | HEIGHT: 76 IN | BODY MASS INDEX: 33.48 KG/M2 | HEART RATE: 107 BPM

## 2024-09-10 DIAGNOSIS — L97.221 VENOUS STASIS ULCER OF LEFT CALF LIMITED TO BREAKDOWN OF SKIN WITHOUT VARICOSE VEINS: ICD-10-CM

## 2024-09-10 DIAGNOSIS — I87.2 VENOUS STASIS ULCER OF LEFT CALF LIMITED TO BREAKDOWN OF SKIN WITHOUT VARICOSE VEINS: ICD-10-CM

## 2024-09-10 DIAGNOSIS — I87.2 CHRONIC VENOUS INSUFFICIENCY: Primary | ICD-10-CM

## 2024-09-10 PROCEDURE — 27000999 HC MEDICAL RECORD PHOTO DOCUMENTATION

## 2024-09-10 PROCEDURE — 99211 OFF/OP EST MAY X REQ PHY/QHP: CPT

## 2024-09-10 PROCEDURE — 29580 STRAPPING UNNA BOOT: CPT

## 2024-09-10 NOTE — PROGRESS NOTES
Home Health: None  Smoker   [] Yes   [x] No   Diabetic  [] Yes  [x] No   Vascular Surgeon: Dr. Carter  Vascular procedures [] Yes [x] No   If so, when and what was done?  Recent Ultrasounds [x] Yes [] No   If so, when were they done? (7/29/24 - venous/arterial)  Compression Pumps  [] Yes [x] No       Right ankle - 26.5 cm     Right calf - 44 cm     Left ankle - 26 cm     Left calf - 42.3 cm  Doppler done in office? [x] Yes [] No   Date: 7/10/24     Right dorsalis: monophasic     Right posterior: biphasic     Left dorsalis: biphasic     Left posterior: abnormal  Is the patient eligible for a graft, and/or currently grafting? [] Yes [] No    If so, which one/size?      Subjective:       Patient ID: Piter Farrell is a 84 y.o. male.    Chief Complaint: Venous Ulcer (Left medial lower leg )    HPI    September 6, 2024:  84-year-old white male, who is here for follow up of his left venous stasis ulcer, chronic venous insufficiency.  The venous stasis ulcer is improving, with regard to measurements.  He has been using Polymem.  There still some maceration around the edges.  He is using boot on the left leg twice a week.    7/10/24 - Mr. Farrell is an 84 year old  male who presents with bilateral lower extremity venous ulcers.  His PCP is Dr. Jesus Hernandez, who referred him to wound clinic.    4/16/24: saw PCP for multiple sores on lower extremities, was given Augmentin 875 mg BID and Bumetanide (no culture was obtained).  4/25/24: saw PCP again for bacterial infection of lower extremities, refilled Augmentin 875 mg BID for 5 days (no culture was obtained)  5/2/24: saw PCP for left leg, was given Silvadine  5/28/24: saw PCP, suggested that he use compression stockings and follow up with his cardiologist (Dr. Carter), he did not do either of these things  7/9/24: saw PCP given Lasix, Cefzil and referred to wound care  He has a history of triple bypass and CHF.   He states that he had venous US done on his legs about 1  month ago with Dr. Carter.  These results were reviewed.  No arterial U/S were performed and the study shows that it was incomplete due to patient resistance.   He is not a diabetic.   He states that he was seen in our clinic about 3 years ago for the same issue.    Today his bilateral lower extremities have multiple open areas that do appear venous in nature.  Because his venous ultrasounds were in conclusive an incomplete we will be ordering full cardiovascular ultrasounds.  He does have 2+ edema as well as hemosiderin staining.  We would like to compress, however, until we have arterial clearing we are hesitant to do so as this has not been addressed.  We did have a lengthy discussion today regarding his congestive heart failure, the need to compress, the impact that compression may have on his CHF due to moving the fluid from his legs to his chest, as well as the need to verify his arterial status prior to compressing.  He is in agreement with all of this and today we will begin covering the open ulcers and applying a Tubigrip over the Kerlix to hold the dressings in place.  He was instructed that he should alternate short elevation of the legs with dependent hanging of the legs so that blood flow can continue to the feet but at the same time tried to begin offloading some of the legs.  He is ambulatory and therefore would be able to wear an Unna boot wound we are at that point.  He does been the majority of his time at a local skilled nursing facility with his handicapped son.   All 10 toe nails today were quite lengthy and were trimmed for the patient.    7/17/24 - Mr. Farrell is here today for followup on his bilateral lower extremity venous ulcers as well as a small wound to the right 2nd toe.  The right 2nd toe was assessed and remains stable.  The scattered wounds at the lower extremities were selectively debrided.  They are beginning to make progress and there are couple of areas were epithelial tissue is  beginning to my great from the edges.  He has completed his p.o. Cefzil.  We are anxious to begin compressing the lower extremities, however, we do not have arterial clearing at this time.  He is scheduled for cardiovascular ultrasounds on 07/29/2024 at 10:00 a.m..  We do have venous ultrasounds on file, however, we do not have arterial.  Once arterial ultrasounds or clear we will begin compressing using Unna boots.    7/25/24 - The patient returns today for f/up on his bilateral lower extremity venous ulcers.  The wounds have all improved since his last visit on 7/17/24, however there is some slight erythema at the left lower extremity.  A culture was obtained of the left leg.  He is scheduled for bilateral venous/arterial US on 7/29/24. However, he is concerned about using compression and wound like to discuss it with his cardiologist, Dr. Carter.   Once the U/S are done, if there is any venous reflux noted these will be sent to Dr. Carter for evaluation and consultation with the patient prior to compression.     7/31/24 - Mr. Farrell returns today for the bilateral lower extremity venous ulcers.  He did complete his cardiovascular U/S on 7/29/24.  Both his arterial and venous studies were normal.  He also had a wound culture done on 7/25/24 which shows 100% Staph aureus.  He was prescribed Doxycycline today x 10 days.  We will also be authorizing Dalvance infusion for him in treatment of Staph aureus.  We would like to begin compression with Unna Boots, however, the patient would first like to clear that with his cardiologist.  He does have an adequate EF.  He does have an appt with Dr. Carter (cardio) this afternoon at 2:30 to have a halter monitor placed due to his a-fib. He was given a copy of his vascular studies to review at that time and will discus compression. Today the wounds on the left leg were selectively debrided.  Santyl has been ordered and we discussed the cost.  If he is unable to obtain he will  continue with Mesalt.     August 7, 2024:    84-year-old white male, she of edema in his legs, and some venous stasis ulcers.  The recent venous ultrasound showed good flow in all of his major veins.  There was no DVT.  His CHF appears to be well controlled.  He will an attempt at compression of both legs.  The venous stasis ulcers in his legs are somewhat stalled at this time.  There is a left lower medial venous stasis ulcer, with slough present.  It is slightly painful today.    August 9, 2024:  84-year-old white male, who is here for a nurse visit only, to have his dressings changed.  There are no new issues.    August 13, 2024:  84-year-old white male, who is here for follow up of his chronic venous insufficiency, left lower leg venous stasis ulcer.  These wounds are all improving.  The wound on his right lower leg has just about healed.  The edema is much improved with Unna boots.  He does have compression stockings at home, for future needs.    August 16, 2024:  84-year-old white male, who is here for a nurse visit only, to have his Unna boot changed.  There are no new issues.  He has venous stasis ulcers.    8/21/24 - Patient is seen today for a nurse visit only for Unna boot change.  No issues or complaints were brought to this provider's attention during this visit.     August 23, 2024:  84-year-old white male, who is here for follow up of his chronic venous insufficiency, left calf venous stasis ulcer.  He has no open wounds on the right leg.  He will no longer need an Unna boot on the right leg.  The wound on the left calf is improving each week.    8/27/24 - Mr. Farrell is seen today for a nurse visit only for an Unna Boot change for the left venous stasis ulcer.  No issues or concerns were brought to this providers attention during this visit.     8/30/24 - The patient followup on the venous stasis ulcers at his left lower extremity.  Today, that wound was quite macerated.  He does attempt to elevate as  much as possible, however, his son is in the nursing facility and so he does been a lot of time there with the legs in a dependent position.  Today, we will change from silver alginate to Polymem with the hopes that that product may hold the moisture a little better.  He will continue with the Unna boots and twice weekly visits as well.  Of note, compression stockings were ordered on Tuesday 8/27/24, he has not received them yet, we will check on this order. He has blood work and a follow up with Dr. Carter (cardio) in [facility]    9/3/24 - The patient is seen today for a nurse visit only for an Unna Boot change for the left venous stasis ulcer.  No issues or concerns were brought to this providers attention during this visit.     9/10/24 - Mr. Farrell is seen today for a nurse visit only for an Unna Boot change for the left venous stasis ulcer.  No issues or concerns were brought to this providers attention during this visit.     Review of Systems   Constitutional: Negative.    Respiratory: Negative.     Cardiovascular: Negative.    Skin:         As documented in the HPI.   All other systems reviewed and are negative.        Objective:        Vitals:    09/10/24 0819   BP: (!) 122/58   Pulse: 107   Resp: 18         Physical Exam  Vitals and nursing note reviewed.   Constitutional:       Appearance: Normal appearance.   Cardiovascular:      Rate and Rhythm: Normal rate.   Pulmonary:      Effort: Pulmonary effort is normal.   Skin:     General: Skin is warm and dry.      Comments:  venous stasis ulcer of the left lower leg   Neurological:      Mental Status: He is alert.            Wound 07/10/24 1154 Venous Ulcer Left lower;medial Leg #2 (Active)   07/10/24 1154   Present on Original Admission: Y   Primary Wound Type: Venous ulcer   Side: Left   Orientation: lower;medial   Location: Leg   Wound Approximate Age at First Assessment (Weeks):    Wound Number: #2   Is this injury device related?:    Incision Type:     Closure Method:    Wound Description (Comments):    Type:    Additional Comments:    Ankle-Brachial Index:    Pulses:    Removal Indication and Assessment:    Wound Outcome:    Dressing Appearance Moist drainage 09/10/24 0813   Drainage Amount Moderate 09/10/24 0813   Drainage Characteristics/Odor Serosanguineous 09/10/24 0813   Appearance Moist;Red;Granulating 09/10/24 0813   Tissue loss description Full thickness 09/10/24 0813   Periwound Area Intact 09/10/24 0813   Wound Edges Open 09/10/24 0813   Wound Length (cm) 1.5 cm 09/10/24 0813   Wound Width (cm) 1.2 cm 09/10/24 0813   Wound Depth (cm) 0.2 cm 09/10/24 0813   Wound Volume (cm^3) 0.36 cm^3 09/10/24 0813   Wound Surface Area (cm^2) 1.8 cm^2 09/10/24 0813   Care Cleansed with:;Wound cleanser 09/10/24 0813   Dressing Applied;Other (comment) 09/10/24 0813   Dressing Change Due 09/13/24 09/10/24 0813     9/10/24    Left medial lower leg   Polymem, calamine unna boot, kerlix, coban   CT        Assessment:         ICD-10-CM ICD-9-CM   1. Chronic venous insufficiency  I87.2 459.81   2. Venous stasis ulcer of left calf limited to breakdown of skin without varicose veins  I87.2 459.81    L97.221 707.12         Procedures:     Nurse visit only     [] Yes [] No   I & D performed  [] Yes [] No   Excisional debridement performed  [] Yes [] No   Selective debridement performed           [] Yes [] No   Mechanical debridement performed         [] Yes [] No  Silver nitrate applied                                     [] Yes [] No  Labs ordered this visit                                  [] Yes [] No   Imaging ordered this visit                           [] Yes [] No   Tissue pathology and/or culture taken         MEDICATIONS    Current Outpatient Medications:     LASIX 40 mg tablet, Take 40 mg by mouth., Disp: , Rfl:     metoprolol succinate (TOPROL-XL) 25 MG 24 hr tablet, Take 50 mg by mouth 2 (two) times daily., Disp: , Rfl:     mupirocin (BACTROBAN) 2 % ointment, , Disp:  ", Rfl:     potassium chloride SA (K-DUR,KLOR-CON) 20 MEQ tablet, Take 20 mEq by mouth., Disp: , Rfl:     rosuvastatin (CRESTOR) 20 MG tablet, Take 20 mg by mouth every evening., Disp: , Rfl:     SSD 1 % cream, , Disp: , Rfl:     tamsulosin (FLOMAX) 0.4 mg Cap, Take 0.4 mg by mouth., Disp: , Rfl:     XARELTO 20 mg Tab, Take 20 mg by mouth., Disp: , Rfl:  Review of patient's allergies indicates:  No Known Allergies    DIAGNOSTICS    Labs/Scans/Micro:    CBC:   Lab Results   Component Value Date    WBC 3.88 (L) 09/11/2023    HGB 12.8 (L) 09/11/2023    HCT 39.7 (L) 09/11/2023    MCV 90.8 09/11/2023     09/11/2023     Sedimentation rate: No results found for: "SEDRATE"     C-reactive protein: No results found for: "CRP"     Chemistry: 3/12/24  Comprehensive Metabolic Panel       Component Ref Range & Units 4 mo ago  (3/12/24)   Sodium 136 - 145 mmol/L 136   Potassium 3.5 - 5.1 mmol/L 4.2   Chloride 98 - 107 mmol/L 100   CO2 23 - 31 mmol/L 27   Glucose 82 - 115 mg/dL 105   Blood Urea Nitrogen 8.4 - 25.7 mg/dL 21.0   Creatinine 0.73 - 1.18 mg/dL 0.82   Calcium 8.8 - 10.0 mg/dL 9.6   Protein Total 5.8 - 7.6 gm/dL 7.4   Albumin 3.4 - 4.8 g/dL 3.8   Globulin 2.4 - 3.5 gm/dL 3.6 High    Albumin/Globulin Ratio 1.1 - 2.0 ratio 1.1   Bilirubin Total <=1.5 mg/dL 0.5   ALP 40 - 150 unit/L 127   ALT 0 - 55 unit/L 13   AST 5 - 34 unit/L 28   eGFR mls/min/1.73/m2 >60   Resulting Agency  [facility]        HBA1C: No components found for: "HBA1C"    Ankle Brachial Index: 7/11/24 -   Right - 1.34  Left - 1.29    Imaging:    Plain film: No results found for this or any previous visit.    MRI: No results found for this or any previous visit.    Bone Scan: No results found for this or any previous visit.    Vascular studies:    7/11/24 -   Arterial:  FINDINGS:  Ultrasound Doppler and color flow imaging were performed on the arteries of the left lower extremity.  A tri phasic flow wave pattern is evident throughout the visualized " arteries of the left lower extremity no significant localized atherosclerotic plaque formation or focal stenosis noted to the visualized arteries of the left lower extremity.  The right ankle brachial index is 1.34 and the left is 1.29.  Impression:  1.  No significant localized atherosclerotic plaque formation or focal stenosis noted to the visualized arteries of theleft lower extremity.    Venous:  FINDINGS:  There is normal compression and flow noted in the  common femoral vein, superficial femoral vein, popliteal vein, and  posterior tibial veinbilaterally.  No evidence of deep venous thrombosis is identified. No significant venous reflux is identified.  The left greater saphenous vein has been surgically harvested.  Impression:  1. No deep venous thrombosis identified to the lower extremities bilaterally.    Microbiology:   7/24/24 - Staph Aureus - Doxy Rx sent        HOME HEALTH AGENCY:  none  WOUND CARE ORDERS:  Left medial lower leg wound: Cleanse with wound cleanser, apply polymem, calamine unna boot, kerlix, and coban to be changed on Tuesday and Fridays   Right anterior lower leg wound: Keep clean and dry, wear compression socks daily    Follow up in 3 days (on 9/13/2024) for MD visit .

## 2024-09-13 ENCOUNTER — HOSPITAL ENCOUNTER (OUTPATIENT)
Dept: WOUND CARE | Facility: HOSPITAL | Age: 84
Discharge: HOME | End: 2024-09-13
Attending: FAMILY MEDICINE
Payer: MEDICARE

## 2024-09-13 VITALS
DIASTOLIC BLOOD PRESSURE: 74 MMHG | RESPIRATION RATE: 18 BRPM | BODY MASS INDEX: 33.48 KG/M2 | WEIGHT: 274.94 LBS | HEART RATE: 103 BPM | HEIGHT: 76 IN | SYSTOLIC BLOOD PRESSURE: 114 MMHG

## 2024-09-13 DIAGNOSIS — I87.2 VENOUS STASIS ULCER OF LEFT CALF LIMITED TO BREAKDOWN OF SKIN WITHOUT VARICOSE VEINS: Primary | ICD-10-CM

## 2024-09-13 DIAGNOSIS — L97.221 VENOUS STASIS ULCER OF LEFT CALF LIMITED TO BREAKDOWN OF SKIN WITHOUT VARICOSE VEINS: Primary | ICD-10-CM

## 2024-09-13 PROCEDURE — 99213 OFFICE O/P EST LOW 20 MIN: CPT | Mod: 25

## 2024-09-13 PROCEDURE — 27000999 HC MEDICAL RECORD PHOTO DOCUMENTATION

## 2024-09-13 PROCEDURE — 29580 STRAPPING UNNA BOOT: CPT

## 2024-09-13 NOTE — PROGRESS NOTES
Home Health: None  Smoker   [] Yes   [x] No   Diabetic  [] Yes  [x] No   Vascular Surgeon: Dr. Carter  Vascular procedures [] Yes [x] No   If so, when and what was done?  Recent Ultrasounds [x] Yes [] No   If so, when were they done? (7/29/24 - venous/arterial)  Compression Pumps  [] Yes [x] No       Right ankle - 26.5 cm     Right calf - 44 cm     Left ankle - 25 cm     Left calf - 41.5 cm  Doppler done in office? [x] Yes [] No   Date: 7/10/24     Right dorsalis: monophasic     Right posterior: biphasic     Left dorsalis: biphasic     Left posterior: abnormal  Is the patient eligible for a graft, and/or currently grafting? [] Yes [] No    If so, which one/size?    NOTES:  Nothing new at this time  Subjective:       Patient ID: Piter Farrell is a 84 y.o. male.    Chief Complaint: Venous Ulcer ((Left medial lower leg))    HPI    September 13, 2024:  84-year-old white male, who is here for follow up of the left calf venous stasis ulcer.  The measurements are improving, but there still maceration around the edges.  He has been using Polymem.  The edema has greatly improved.  He is also using an Unna boot on the left leg at this time.    9/10/24 - Mr. Farrell is seen today for a nurse visit only for an Unna Boot change for the left venous stasis ulcer.  No issues or concerns were brought to this providers attention during this visit.     September 6, 2024:  84-year-old white male, who is here for follow up of his left venous stasis ulcer, chronic venous insufficiency.  The venous stasis ulcer is improving, with regard to measurements.  He has been using Polymem.  There still some maceration around the edges.  He is using boot on the left leg twice a week.    9/3/24 - The patient is seen today for a nurse visit only for an Unna Boot change for the left venous stasis ulcer.  No issues or concerns were brought to this providers attention during this visit.     7/10/24 - Mr. Farrell is an 84 year old  male who presents  with bilateral lower extremity venous ulcers.  His PCP is Dr. Jesus Hernandez, who referred him to wound clinic.    4/16/24: saw PCP for multiple sores on lower extremities, was given Augmentin 875 mg BID and Bumetanide (no culture was obtained).  4/25/24: saw PCP again for bacterial infection of lower extremities, refilled Augmentin 875 mg BID for 5 days (no culture was obtained)  5/2/24: saw PCP for left leg, was given Silvadine  5/28/24: saw PCP, suggested that he use compression stockings and follow up with his cardiologist (Dr. Carter), he did not do either of these things  7/9/24: saw PCP given Lasix, Cefzil and referred to wound care  He has a history of triple bypass and CHF.   He states that he had venous US done on his legs about 1 month ago with Dr. Carter.  These results were reviewed.  No arterial U/S were performed and the study shows that it was incomplete due to patient resistance.   He is not a diabetic.   He states that he was seen in our clinic about 3 years ago for the same issue.    Today his bilateral lower extremities have multiple open areas that do appear venous in nature.  Because his venous ultrasounds were in conclusive an incomplete we will be ordering full cardiovascular ultrasounds.  He does have 2+ edema as well as hemosiderin staining.  We would like to compress, however, until we have arterial clearing we are hesitant to do so as this has not been addressed.  We did have a lengthy discussion today regarding his congestive heart failure, the need to compress, the impact that compression may have on his CHF due to moving the fluid from his legs to his chest, as well as the need to verify his arterial status prior to compressing.  He is in agreement with all of this and today we will begin covering the open ulcers and applying a Tubigrip over the Kerlix to hold the dressings in place.  He was instructed that he should alternate short elevation of the legs with dependent hanging of the  legs so that blood flow can continue to the feet but at the same time tried to begin offloading some of the legs.  He is ambulatory and therefore would be able to wear an Unna boot wound we are at that point.  He does been the majority of his time at a local skilled nursing facility with his handicapped son.   All 10 toe nails today were quite lengthy and were trimmed for the patient.    7/17/24 - Mr. Farrell is here today for followup on his bilateral lower extremity venous ulcers as well as a small wound to the right 2nd toe.  The right 2nd toe was assessed and remains stable.  The scattered wounds at the lower extremities were selectively debrided.  They are beginning to make progress and there are couple of areas were epithelial tissue is beginning to my great from the edges.  He has completed his p.o. Cefzil.  We are anxious to begin compressing the lower extremities, however, we do not have arterial clearing at this time.  He is scheduled for cardiovascular ultrasounds on 07/29/2024 at 10:00 a.m..  We do have venous ultrasounds on file, however, we do not have arterial.  Once arterial ultrasounds or clear we will begin compressing using Unna boots.    7/25/24 - The patient returns today for f/up on his bilateral lower extremity venous ulcers.  The wounds have all improved since his last visit on 7/17/24, however there is some slight erythema at the left lower extremity.  A culture was obtained of the left leg.  He is scheduled for bilateral venous/arterial US on 7/29/24. However, he is concerned about using compression and wound like to discuss it with his cardiologist, Dr. Carter.   Once the U/S are done, if there is any venous reflux noted these will be sent to Dr. Carter for evaluation and consultation with the patient prior to compression.     7/31/24 - Mr. Farrell returns today for the bilateral lower extremity venous ulcers.  He did complete his cardiovascular U/S on 7/29/24.  Both his arterial and venous  studies were normal.  He also had a wound culture done on 7/25/24 which shows 100% Staph aureus.  He was prescribed Doxycycline today x 10 days.  We will also be authorizing Dalvance infusion for him in treatment of Staph aureus.  We would like to begin compression with Unna Boots, however, the patient would first like to clear that with his cardiologist.  He does have an adequate EF.  He does have an appt with Dr. Carter (cardio) this afternoon at 2:30 to have a halter monitor placed due to his a-fib. He was given a copy of his vascular studies to review at that time and will discus compression. Today the wounds on the left leg were selectively debrided.  Santyl has been ordered and we discussed the cost.  If he is unable to obtain he will continue with Mesalt.     August 7, 2024:    84-year-old white male, she of edema in his legs, and some venous stasis ulcers.  The recent venous ultrasound showed good flow in all of his major veins.  There was no DVT.  His CHF appears to be well controlled.  He will an attempt at compression of both legs.  The venous stasis ulcers in his legs are somewhat stalled at this time.  There is a left lower medial venous stasis ulcer, with slough present.  It is slightly painful today.    August 9, 2024:  84-year-old white male, who is here for a nurse visit only, to have his dressings changed.  There are no new issues.    August 13, 2024:  84-year-old white male, who is here for follow up of his chronic venous insufficiency, left lower leg venous stasis ulcer.  These wounds are all improving.  The wound on his right lower leg has just about healed.  The edema is much improved with Unna boots.  He does have compression stockings at home, for future needs.    August 16, 2024:  84-year-old white male, who is here for a nurse visit only, to have his Unna boot changed.  There are no new issues.  He has venous stasis ulcers.    8/21/24 - Patient is seen today for a nurse visit only for Unna  boot change.  No issues or complaints were brought to this provider's attention during this visit.     August 23, 2024:  84-year-old white male, who is here for follow up of his chronic venous insufficiency, left calf venous stasis ulcer.  He has no open wounds on the right leg.  He will no longer need an Unna boot on the right leg.  The wound on the left calf is improving each week.    8/27/24 - Mr. Farrell is seen today for a nurse visit only for an Unna Boot change for the left venous stasis ulcer.  No issues or concerns were brought to this providers attention during this visit.     8/30/24 - The patient followup on the venous stasis ulcers at his left lower extremity.  Today, that wound was quite macerated.  He does attempt to elevate as much as possible, however, his son is in the nursing facility and so he does been a lot of time there with the legs in a dependent position.  Today, we will change from silver alginate to Polymem with the hopes that that product may hold the moisture a little better.  He will continue with the Unna boots and twice weekly visits as well.  Of note, compression stockings were ordered on Tuesday 8/27/24, he has not received them yet, we will check on this order. He has blood work and a follow up with Dr. Carter (cardio) in Novemeber    Review of Systems   Constitutional: Negative.    Respiratory: Negative.     Cardiovascular: Negative.    Skin:         As documented in the HPI.   All other systems reviewed and are negative.        Objective:        Vitals:    09/13/24 0750   BP: 114/74   Pulse: 103   Resp: 18           Physical Exam  Vitals and nursing note reviewed.   Constitutional:       Appearance: Normal appearance.   Cardiovascular:      Rate and Rhythm: Normal rate.   Pulmonary:      Effort: Pulmonary effort is normal.   Skin:     General: Skin is warm and dry.      Comments: Edema in his left lower leg has just about resolved.  The wound is clean, the measurements are  slightly improved, but there still much maceration around the edges.  I did only a mechanical debridement.   Neurological:      Mental Status: He is alert.            Wound 07/10/24 1154 Venous Ulcer Left lower;medial Leg #2 (Active)   07/10/24 1154   Present on Original Admission: Y   Primary Wound Type: Venous ulcer   Side: Left   Orientation: lower;medial   Location: Leg   Wound Approximate Age at First Assessment (Weeks):    Wound Number: #2   Is this injury device related?:    Incision Type:    Closure Method:    Wound Description (Comments):    Type:    Additional Comments:    Ankle-Brachial Index:    Pulses:    Removal Indication and Assessment:    Wound Outcome:    Dressing Appearance Moist drainage 09/13/24 0751   Drainage Amount Moderate 09/13/24 0751   Drainage Characteristics/Odor Serosanguineous 09/13/24 0751   Appearance Red;Moist;Granulating 09/13/24 0751   Tissue loss description Full thickness 09/13/24 0751   Periwound Area Moist;Pale white;Edematous 09/13/24 0751   Wound Edges Open 09/13/24 0751   Wound Length (cm) 1.2 cm 09/13/24 0751   Wound Width (cm) 1.3 cm 09/13/24 0751   Wound Depth (cm) 0.2 cm 09/13/24 0751   Wound Volume (cm^3) 0.312 cm^3 09/13/24 0751   Wound Surface Area (cm^2) 1.56 cm^2 09/13/24 0751   Care Cleansed with:;Wound cleanser 09/13/24 0751   Dressing Applied 09/13/24 0751   Dressing Change Due 09/17/24 09/13/24 0751     9/10/24    Left medial lower leg   Polymem, calamine unna boot, kerlix, coban   CT    9/13/24    Left lower medial leg (pre)  (silver alginate, calamine unna boot, kerix, coban)        Assessment:         ICD-10-CM ICD-9-CM   1. Venous stasis ulcer of left calf limited to breakdown of skin without varicose veins  I87.2 459.81    L97.221 707.12           Procedures:   Mechanical only    [] Yes [] No   I & D performed  [] Yes [] No   Excisional debridement performed  [] Yes [] No   Selective debridement performed           [x] Yes [] No   Mechanical debridement  "performed         [] Yes [] No  Silver nitrate applied                                     [] Yes [] No  Labs ordered this visit                                  [] Yes [] No   Imaging ordered this visit                           [] Yes [] No   Tissue pathology and/or culture taken         MEDICATIONS    Current Outpatient Medications:     LASIX 40 mg tablet, Take 40 mg by mouth., Disp: , Rfl:     metoprolol succinate (TOPROL-XL) 25 MG 24 hr tablet, Take 50 mg by mouth 2 (two) times daily., Disp: , Rfl:     potassium chloride SA (K-DUR,KLOR-CON) 20 MEQ tablet, Take 20 mEq by mouth., Disp: , Rfl:     rosuvastatin (CRESTOR) 20 MG tablet, Take 20 mg by mouth every evening., Disp: , Rfl:     tamsulosin (FLOMAX) 0.4 mg Cap, Take 0.4 mg by mouth., Disp: , Rfl:     XARELTO 20 mg Tab, Take 20 mg by mouth., Disp: , Rfl:     mupirocin (BACTROBAN) 2 % ointment, , Disp: , Rfl:     SSD 1 % cream, , Disp: , Rfl:  Review of patient's allergies indicates:  No Known Allergies    DIAGNOSTICS    Labs/Scans/Micro:    CBC:   Lab Results   Component Value Date    WBC 3.88 (L) 09/11/2023    HGB 12.8 (L) 09/11/2023    HCT 39.7 (L) 09/11/2023    MCV 90.8 09/11/2023     09/11/2023     Sedimentation rate: No results found for: "SEDRATE"     C-reactive protein: No results found for: "CRP"     Chemistry: 3/12/24  Comprehensive Metabolic Panel       Component Ref Range & Units 4 mo ago  (3/12/24)   Sodium 136 - 145 mmol/L 136   Potassium 3.5 - 5.1 mmol/L 4.2   Chloride 98 - 107 mmol/L 100   CO2 23 - 31 mmol/L 27   Glucose 82 - 115 mg/dL 105   Blood Urea Nitrogen 8.4 - 25.7 mg/dL 21.0   Creatinine 0.73 - 1.18 mg/dL 0.82   Calcium 8.8 - 10.0 mg/dL 9.6   Protein Total 5.8 - 7.6 gm/dL 7.4   Albumin 3.4 - 4.8 g/dL 3.8   Globulin 2.4 - 3.5 gm/dL 3.6 High    Albumin/Globulin Ratio 1.1 - 2.0 ratio 1.1   Bilirubin Total <=1.5 mg/dL 0.5   ALP 40 - 150 unit/L 127   ALT 0 - 55 unit/L 13   AST 5 - 34 unit/L 28   eGFR mls/min/1.73/m2 >60   Resulting " "Agency  [facility]        HBA1C: No components found for: "HBA1C"    Ankle Brachial Index: 7/11/24 -   Right - 1.34  Left - 1.29    Imaging:    Plain film: No results found for this or any previous visit.    MRI: No results found for this or any previous visit.    Bone Scan: No results found for this or any previous visit.    Vascular studies:    7/11/24 -   Arterial:  FINDINGS:  Ultrasound Doppler and color flow imaging were performed on the arteries of the left lower extremity.  A tri phasic flow wave pattern is evident throughout the visualized arteries of the left lower extremity no significant localized atherosclerotic plaque formation or focal stenosis noted to the visualized arteries of the left lower extremity.  The right ankle brachial index is 1.34 and the left is 1.29.  Impression:  1.  No significant localized atherosclerotic plaque formation or focal stenosis noted to the visualized arteries of theleft lower extremity.    Venous:  FINDINGS:  There is normal compression and flow noted in the  common femoral vein, superficial femoral vein, popliteal vein, and  posterior tibial veinbilaterally.  No evidence of deep venous thrombosis is identified. No significant venous reflux is identified.  The left greater saphenous vein has been surgically harvested.  Impression:  1. No deep venous thrombosis identified to the lower extremities bilaterally.    Microbiology:   7/24/24 - Staph Aureus - Doxy Rx sent        HOME HEALTH AGENCY:  none  WOUND CARE ORDERS:  Left medial lower leg wound: Cleanse with wound cleanser, apply silver alginate, calamine unna boot, kerlix, and coban to be changed on Tuesday and Fridays   Right anterior lower leg wound: Keep clean and dry, wear compression socks daily    Follow up in 4 days (on 9/17/2024).       "

## 2024-09-17 ENCOUNTER — HOSPITAL ENCOUNTER (OUTPATIENT)
Dept: WOUND CARE | Facility: HOSPITAL | Age: 84
Discharge: HOME | End: 2024-09-17
Attending: NURSE PRACTITIONER
Payer: MEDICARE

## 2024-09-17 VITALS
HEIGHT: 76 IN | HEART RATE: 98 BPM | DIASTOLIC BLOOD PRESSURE: 72 MMHG | WEIGHT: 274.94 LBS | BODY MASS INDEX: 33.48 KG/M2 | SYSTOLIC BLOOD PRESSURE: 124 MMHG | RESPIRATION RATE: 18 BRPM

## 2024-09-17 DIAGNOSIS — I87.2 CHRONIC VENOUS INSUFFICIENCY: ICD-10-CM

## 2024-09-17 DIAGNOSIS — I87.2 VENOUS STASIS ULCER OF LEFT CALF LIMITED TO BREAKDOWN OF SKIN WITHOUT VARICOSE VEINS: Primary | ICD-10-CM

## 2024-09-17 DIAGNOSIS — L97.221 VENOUS STASIS ULCER OF LEFT CALF LIMITED TO BREAKDOWN OF SKIN WITHOUT VARICOSE VEINS: Primary | ICD-10-CM

## 2024-09-17 PROCEDURE — 27000999 HC MEDICAL RECORD PHOTO DOCUMENTATION

## 2024-09-17 PROCEDURE — 99211 OFF/OP EST MAY X REQ PHY/QHP: CPT | Mod: 25

## 2024-09-17 PROCEDURE — 29580 STRAPPING UNNA BOOT: CPT

## 2024-09-17 NOTE — PROGRESS NOTES
[facility]: None  Smoker   [] Yes   [x] No   Diabetic  [] Yes  [x] No   Vascular Surgeon: Dr. Carter  Vascular procedures [] Yes [x] No   If so, when and what was done?  Recent Ultrasounds [x] Yes [] No   If so, when were they done? (7/29/24 - venous/arterial)  Compression Pumps  [] Yes [x] No       Right ankle - 26.5 cm     Right calf - 44 cm     Left ankle - 25 cm     Left calf - 41.5 cm  Doppler done in office? [x] Yes [] No   Date: 7/10/24     Right dorsalis: monophasic     Right posterior: biphasic     Left dorsalis: biphasic     Left posterior: abnormal  Is the patient eligible for a graft, and/or currently grafting? [] Yes [] No    If so, which one/size?    Subjective:       Patient ID: Piter Farrell is a 84 y.o. male.    Chief Complaint: Venous Ulcer (Left medial lower leg)    HPI    September 13, 2024:  84-year-old white male, who is here for follow up of the left calf venous stasis ulcer.  The measurements are improving, but there still maceration around the edges.  He has been using Polymem.  The edema has greatly improved.  He is also using an Unna boot on the left leg at this time.    9/10/24 - Mr. Farrell is seen today for a nurse visit only for an Unna Boot change for the left venous stasis ulcer.  No issues or concerns were brought to this providers attention during this visit.     September 6, 2024:  84-year-old white male, who is here for follow up of his left venous stasis ulcer, chronic venous insufficiency.  The venous stasis ulcer is improving, with regard to measurements.  He has been using Polymem.  There still some maceration around the edges.  He is using boot on the left leg twice a week.    9/3/24 - The patient is seen today for a nurse visit only for an Unna Boot change for the left venous stasis ulcer.  No issues or concerns were brought to this providers attention during this visit.     7/10/24 - Mr. Farrell is an 84 year old  male who presents with bilateral lower extremity  venous ulcers.  His PCP is Dr. Jesus Hernandez, who referred him to wound clinic.    4/16/24: saw PCP for multiple sores on lower extremities, was given Augmentin 875 mg BID and Bumetanide (no culture was obtained).  4/25/24: saw PCP again for bacterial infection of lower extremities, refilled Augmentin 875 mg BID for 5 days (no culture was obtained)  5/2/24: saw PCP for left leg, was given Silvadine  5/28/24: saw PCP, suggested that he use compression stockings and follow up with his cardiologist (Dr. Carter), he did not do either of these things  7/9/24: saw PCP given Lasix, Cefzil and referred to wound care  He has a history of triple bypass and CHF.   He states that he had venous US done on his legs about 1 month ago with Dr. Carter.  These results were reviewed.  No arterial U/S were performed and the study shows that it was incomplete due to patient resistance.   He is not a diabetic.   He states that he was seen in our clinic about 3 years ago for the same issue.    Today his bilateral lower extremities have multiple open areas that do appear venous in nature.  Because his venous ultrasounds were in conclusive an incomplete we will be ordering full cardiovascular ultrasounds.  He does have 2+ edema as well as hemosiderin staining.  We would like to compress, however, until we have arterial clearing we are hesitant to do so as this has not been addressed.  We did have a lengthy discussion today regarding his congestive heart failure, the need to compress, the impact that compression may have on his CHF due to moving the fluid from his legs to his chest, as well as the need to verify his arterial status prior to compressing.  He is in agreement with all of this and today we will begin covering the open ulcers and applying a Tubigrip over the Kerlix to hold the dressings in place.  He was instructed that he should alternate short elevation of the legs with dependent hanging of the legs so that blood flow can  continue to the feet but at the same time tried to begin offloading some of the legs.  He is ambulatory and therefore would be able to wear an Unna boot wound we are at that point.  He does been the majority of his time at a local skilled nursing facility with his handicapped son.   All 10 toe nails today were quite lengthy and were trimmed for the patient.    7/17/24 - Mr. Farrell is here today for followup on his bilateral lower extremity venous ulcers as well as a small wound to the right 2nd toe.  The right 2nd toe was assessed and remains stable.  The scattered wounds at the lower extremities were selectively debrided.  They are beginning to make progress and there are couple of areas were epithelial tissue is beginning to my great from the edges.  He has completed his p.o. Cefzil.  We are anxious to begin compressing the lower extremities, however, we do not have arterial clearing at this time.  He is scheduled for cardiovascular ultrasounds on 07/29/2024 at 10:00 a.m..  We do have venous ultrasounds on file, however, we do not have arterial.  Once arterial ultrasounds or clear we will begin compressing using Unna boots.    7/25/24 - The patient returns today for f/up on his bilateral lower extremity venous ulcers.  The wounds have all improved since his last visit on 7/17/24, however there is some slight erythema at the left lower extremity.  A culture was obtained of the left leg.  He is scheduled for bilateral venous/arterial US on 7/29/24. However, he is concerned about using compression and wound like to discuss it with his cardiologist, Dr. Carter.   Once the U/S are done, if there is any venous reflux noted these will be sent to Dr. Carter for evaluation and consultation with the patient prior to compression.     7/31/24 - Mr. Farrell returns today for the bilateral lower extremity venous ulcers.  He did complete his cardiovascular U/S on 7/29/24.  Both his arterial and venous studies were normal.  He also  had a wound culture done on 7/25/24 which shows 100% Staph aureus.  He was prescribed Doxycycline today x 10 days.  We will also be authorizing Dalvance infusion for him in treatment of Staph aureus.  We would like to begin compression with Unna Boots, however, the patient would first like to clear that with his cardiologist.  He does have an adequate EF.  He does have an appt with Dr. Carter (cardio) this afternoon at 2:30 to have a halter monitor placed due to his a-fib. He was given a copy of his vascular studies to review at that time and will discus compression. Today the wounds on the left leg were selectively debrided.  Santyl has been ordered and we discussed the cost.  If he is unable to obtain he will continue with Mesalt.     August 7, 2024:    84-year-old white male, she of edema in his legs, and some venous stasis ulcers.  The recent venous ultrasound showed good flow in all of his major veins.  There was no DVT.  His CHF appears to be well controlled.  He will an attempt at compression of both legs.  The venous stasis ulcers in his legs are somewhat stalled at this time.  There is a left lower medial venous stasis ulcer, with slough present.  It is slightly painful today.    August 9, 2024:  84-year-old white male, who is here for a nurse visit only, to have his dressings changed.  There are no new issues.    August 13, 2024:  84-year-old white male, who is here for follow up of his chronic venous insufficiency, left lower leg venous stasis ulcer.  These wounds are all improving.  The wound on his right lower leg has just about healed.  The edema is much improved with Unna boots.  He does have compression stockings at home, for future needs.    August 16, 2024:  84-year-old white male, who is here for a nurse visit only, to have his Unna boot changed.  There are no new issues.  He has venous stasis ulcers.    8/21/24 - Patient is seen today for a nurse visit only for Unna boot change.  No issues or  complaints were brought to this provider's attention during this visit.     August 23, 2024:  84-year-old white male, who is here for follow up of his chronic venous insufficiency, left calf venous stasis ulcer.  He has no open wounds on the right leg.  He will no longer need an Unna boot on the right leg.  The wound on the left calf is improving each week.    8/27/24 - Mr. Farrell is seen today for a nurse visit only for an Unna Boot change for the left venous stasis ulcer.  No issues or concerns were brought to this providers attention during this visit.     8/30/24 - The patient followup on the venous stasis ulcers at his left lower extremity.  Today, that wound was quite macerated.  He does attempt to elevate as much as possible, however, his son is in the nursing facility and so he does been a lot of time there with the legs in a dependent position.  Today, we will change from silver alginate to Polymem with the hopes that that product may hold the moisture a little better.  He will continue with the Unna boots and twice weekly visits as well.  Of note, compression stockings were ordered on Tuesday 8/27/24, he has not received them yet, we will check on this order. He has blood work and a follow up with Dr. Carter (cardio) in Novemeber 9/17/24 - Mr. Farrell is seen today for a nurse visit only for an Unna Boot change for the left venous stasis ulcer.  No issues or concerns were brought to this providers attention during this visit.     Review of Systems   Constitutional: Negative.    Respiratory: Negative.     Cardiovascular: Negative.    Skin:         As documented in the HPI.   All other systems reviewed and are negative.        Objective:        Vitals:    09/17/24 0855   BP: 124/72   Pulse: 98   Resp: 18           Physical Exam  Vitals and nursing note reviewed.   Constitutional:       Appearance: Normal appearance.   Cardiovascular:      Rate and Rhythm: Normal rate.   Pulmonary:      Effort: Pulmonary  effort is normal.   Skin:     General: Skin is warm and dry.      Comments: Venous ulcer, left lower leg   Neurological:      Mental Status: He is alert.            Wound 07/10/24 1154 Venous Ulcer Left lower;medial Leg #2 (Active)   07/10/24 1154   Present on Original Admission: Y   Primary Wound Type: Venous ulcer   Side: Left   Orientation: lower;medial   Location: Leg   Wound Approximate Age at First Assessment (Weeks):    Wound Number: #2   Is this injury device related?:    Incision Type:    Closure Method:    Wound Description (Comments):    Type:    Additional Comments:    Ankle-Brachial Index:    Pulses:    Removal Indication and Assessment:    Wound Outcome:    Dressing Appearance Moist drainage 09/17/24 0856   Drainage Amount Moderate 09/17/24 0856   Drainage Characteristics/Odor Serosanguineous 09/17/24 0856   Appearance Moist;Red;Granulating 09/17/24 0856   Tissue loss description Full thickness 09/17/24 0856   Periwound Area Intact 09/17/24 0856   Wound Edges Open 09/17/24 0856   Wound Length (cm) 1 cm 09/17/24 0856   Wound Width (cm) 1 cm 09/17/24 0856   Wound Depth (cm) 0.2 cm 09/17/24 0856   Wound Volume (cm^3) 0.2 cm^3 09/17/24 0856   Wound Surface Area (cm^2) 1 cm^2 09/17/24 0856   Care Cleansed with:;Wound cleanser 09/17/24 0856   Dressing Applied;Other (comment) 09/17/24 0856   Dressing Change Due 09/20/24 09/17/24 0856     9/17/24    Left medial lower leg   silver alginate, zinc unna boot, kerlix, coban   CT        Assessment:         ICD-10-CM ICD-9-CM   1. Venous stasis ulcer of left calf limited to breakdown of skin without varicose veins  I87.2 459.81    L97.221 707.12   2. Chronic venous insufficiency  I87.2 459.81         Procedures:     Nurse visit only     [] Yes [] No   I & D performed  [] Yes [] No   Excisional debridement performed  [] Yes [] No   Selective debridement performed           [x] Yes [] No   Mechanical debridement performed         [] Yes [] No  Silver nitrate applied     "                                 [] Yes [] No  Labs ordered this visit                                  [] Yes [] No   Imaging ordered this visit                           [] Yes [] No   Tissue pathology and/or culture taken         MEDICATIONS    Current Outpatient Medications:     LASIX 40 mg tablet, Take 40 mg by mouth., Disp: , Rfl:     mupirocin (BACTROBAN) 2 % ointment, , Disp: , Rfl:     potassium chloride SA (K-DUR,KLOR-CON) 20 MEQ tablet, Take 20 mEq by mouth., Disp: , Rfl:     rosuvastatin (CRESTOR) 20 MG tablet, Take 20 mg by mouth every evening., Disp: , Rfl:     SSD 1 % cream, , Disp: , Rfl:     tamsulosin (FLOMAX) 0.4 mg Cap, Take 0.4 mg by mouth., Disp: , Rfl:     XARELTO 20 mg Tab, Take 20 mg by mouth., Disp: , Rfl:     metoprolol succinate (TOPROL-XL) 25 MG 24 hr tablet, Take 50 mg by mouth 2 (two) times daily. (Patient not taking: Reported on 9/17/2024), Disp: , Rfl:  Review of patient's allergies indicates:  No Known Allergies    DIAGNOSTICS    Labs/Scans/Micro:    CBC:   Lab Results   Component Value Date    WBC 3.88 (L) 09/11/2023    HGB 12.8 (L) 09/11/2023    HCT 39.7 (L) 09/11/2023    MCV 90.8 09/11/2023     09/11/2023     Sedimentation rate: No results found for: "SEDRATE"     C-reactive protein: No results found for: "CRP"     Chemistry: 3/12/24  Comprehensive Metabolic Panel       Component Ref Range & Units 4 mo ago  (3/12/24)   Sodium 136 - 145 mmol/L 136   Potassium 3.5 - 5.1 mmol/L 4.2   Chloride 98 - 107 mmol/L 100   CO2 23 - 31 mmol/L 27   Glucose 82 - 115 mg/dL 105   Blood Urea Nitrogen 8.4 - 25.7 mg/dL 21.0   Creatinine 0.73 - 1.18 mg/dL 0.82   Calcium 8.8 - 10.0 mg/dL 9.6   Protein Total 5.8 - 7.6 gm/dL 7.4   Albumin 3.4 - 4.8 g/dL 3.8   Globulin 2.4 - 3.5 gm/dL 3.6 High    Albumin/Globulin Ratio 1.1 - 2.0 ratio 1.1   Bilirubin Total <=1.5 mg/dL 0.5   ALP 40 - 150 unit/L 127   ALT 0 - 55 unit/L 13   AST 5 - 34 unit/L 28   eGFR mls/min/1.73/m2 >60   Resulting Agency  [facility] " "       HBA1C: No components found for: "HBA1C"    Ankle Brachial Index: 7/11/24 -   Right - 1.34  Left - 1.29    Imaging:    Plain film: No results found for this or any previous visit.    MRI: No results found for this or any previous visit.    Bone Scan: No results found for this or any previous visit.    Vascular studies:    7/11/24 -   Arterial:  FINDINGS:  Ultrasound Doppler and color flow imaging were performed on the arteries of the left lower extremity.  A tri phasic flow wave pattern is evident throughout the visualized arteries of the left lower extremity no significant localized atherosclerotic plaque formation or focal stenosis noted to the visualized arteries of the left lower extremity.  The right ankle brachial index is 1.34 and the left is 1.29.  Impression:  1.  No significant localized atherosclerotic plaque formation or focal stenosis noted to the visualized arteries of theleft lower extremity.    Venous:  FINDINGS:  There is normal compression and flow noted in the  common femoral vein, superficial femoral vein, popliteal vein, and  posterior tibial veinbilaterally.  No evidence of deep venous thrombosis is identified. No significant venous reflux is identified.  The left greater saphenous vein has been surgically harvested.  Impression:  1. No deep venous thrombosis identified to the lower extremities bilaterally.    Microbiology:   7/24/24 - Staph Aureus - Doxy Rx sent        HOME HEALTH AGENCY:  none  WOUND CARE ORDERS:  Left medial lower leg wound: Cleanse with wound cleanser, apply silver alginate, calamine unna boot, kerlix, and coban to be changed on Tuesday and Fridays   Right anterior lower leg wound: Keep clean and dry, wear compression socks daily    Follow up in 3 days (on 9/20/2024) for MD visit .       "

## 2024-09-20 ENCOUNTER — HOSPITAL ENCOUNTER (OUTPATIENT)
Dept: WOUND CARE | Facility: HOSPITAL | Age: 84
Discharge: HOME | End: 2024-09-20
Attending: FAMILY MEDICINE
Payer: MEDICARE

## 2024-09-20 VITALS
SYSTOLIC BLOOD PRESSURE: 132 MMHG | HEART RATE: 108 BPM | DIASTOLIC BLOOD PRESSURE: 71 MMHG | WEIGHT: 274.94 LBS | BODY MASS INDEX: 33.48 KG/M2 | RESPIRATION RATE: 18 BRPM | HEIGHT: 76 IN

## 2024-09-20 DIAGNOSIS — L97.221 VENOUS STASIS ULCER OF LEFT CALF LIMITED TO BREAKDOWN OF SKIN WITHOUT VARICOSE VEINS: Primary | ICD-10-CM

## 2024-09-20 DIAGNOSIS — I87.2 CHRONIC VENOUS INSUFFICIENCY: ICD-10-CM

## 2024-09-20 DIAGNOSIS — I87.2 VENOUS STASIS ULCER OF LEFT CALF LIMITED TO BREAKDOWN OF SKIN WITHOUT VARICOSE VEINS: Primary | ICD-10-CM

## 2024-09-20 PROCEDURE — 29580 STRAPPING UNNA BOOT: CPT

## 2024-09-20 PROCEDURE — 27000999 HC MEDICAL RECORD PHOTO DOCUMENTATION

## 2024-09-20 PROCEDURE — 99213 OFFICE O/P EST LOW 20 MIN: CPT

## 2024-09-20 NOTE — PROGRESS NOTES
[facility]: None  Smoker   [] Yes   [x] No   Diabetic  [] Yes  [x] No   Vascular Surgeon: Dr. Carter  Vascular procedures [] Yes [x] No   If so, when and what was done?  Recent Ultrasounds [x] Yes [] No   If so, when were they done? (7/29/24 - venous/arterial)  Compression Pumps  [] Yes [x] No       Right ankle - 26.5 cm     Right calf - 44.9 cm     Left ankle - 26 cm     Left calf - 42.3 cm  Doppler done in office? [x] Yes [] No   Date: 7/10/24     Right dorsalis: monophasic     Right posterior: biphasic     Left dorsalis: biphasic     Left posterior: abnormal  Is the patient eligible for a graft, and/or currently grafting? [] Yes [] No    If so, which one/size?    NOTES:  Nothing new at this time. He complains of being SOB with small amount of exertion the past few days. He is instructed to call his PCP or cardiologist to inform them of this.   He has an appt scheduled with his cardiologist in October  Subjective:       Patient ID: Piter Farrell is a 84 y.o. male.    Chief Complaint: Venous Ulcer ( (Left medial lower leg))    HPI    September 20, 2024:  84-year-old white male, who is here for follow up of the left calf venous stasis ulcer.  It is measuring smaller today.  He has been using an Unna boot.  The maceration around the edges is much improved.    September 13, 2024:  84-year-old white male, who is here for follow up of the left calf venous stasis ulcer.  The measurements are improving, but there still maceration around the edges.  He has been using Polymem.  The edema has greatly improved.  He is also using an Unna boot on the left leg at this time.    9/10/24 - Mr. Farrell is seen today for a nurse visit only for an Unna Boot change for the left venous stasis ulcer.  No issues or concerns were brought to this providers attention during this visit.     September 6, 2024:  84-year-old white male, who is here for follow up of his left venous stasis ulcer, chronic venous insufficiency.  The venous stasis  ulcer is improving, with regard to measurements.  He has been using Polymem.  There still some maceration around the edges.  He is using boot on the left leg twice a week.    9/3/24 - The patient is seen today for a nurse visit only for an Unna Boot change for the left venous stasis ulcer.  No issues or concerns were brought to this providers attention during this visit.     7/10/24 - Mr. Farrell is an 84 year old  male who presents with bilateral lower extremity venous ulcers.  His PCP is Dr. Jesus Hernandez, who referred him to wound clinic.    4/16/24: saw PCP for multiple sores on lower extremities, was given Augmentin 875 mg BID and Bumetanide (no culture was obtained).  4/25/24: saw PCP again for bacterial infection of lower extremities, refilled Augmentin 875 mg BID for 5 days (no culture was obtained)  5/2/24: saw PCP for left leg, was given Silvadine  5/28/24: saw PCP, suggested that he use compression stockings and follow up with his cardiologist (Dr. Carter), he did not do either of these things  7/9/24: saw PCP given Lasix, Cefzil and referred to wound care  He has a history of triple bypass and CHF.   He states that he had venous US done on his legs about 1 month ago with Dr. Carter.  These results were reviewed.  No arterial U/S were performed and the study shows that it was incomplete due to patient resistance.   He is not a diabetic.   He states that he was seen in our clinic about 3 years ago for the same issue.    Today his bilateral lower extremities have multiple open areas that do appear venous in nature.  Because his venous ultrasounds were in conclusive an incomplete we will be ordering full cardiovascular ultrasounds.  He does have 2+ edema as well as hemosiderin staining.  We would like to compress, however, until we have arterial clearing we are hesitant to do so as this has not been addressed.  We did have a lengthy discussion today regarding his congestive heart failure, the need to  compress, the impact that compression may have on his CHF due to moving the fluid from his legs to his chest, as well as the need to verify his arterial status prior to compressing.  He is in agreement with all of this and today we will begin covering the open ulcers and applying a Tubigrip over the Kerlix to hold the dressings in place.  He was instructed that he should alternate short elevation of the legs with dependent hanging of the legs so that blood flow can continue to the feet but at the same time tried to begin offloading some of the legs.  He is ambulatory and therefore would be able to wear an Unna boot wound we are at that point.  He does been the majority of his time at a local skilled nursing facility with his handicapped son.   All 10 toe nails today were quite lengthy and were trimmed for the patient.    7/17/24 - Mr. Farrell is here today for followup on his bilateral lower extremity venous ulcers as well as a small wound to the right 2nd toe.  The right 2nd toe was assessed and remains stable.  The scattered wounds at the lower extremities were selectively debrided.  They are beginning to make progress and there are couple of areas were epithelial tissue is beginning to my great from the edges.  He has completed his p.o. Cefzil.  We are anxious to begin compressing the lower extremities, however, we do not have arterial clearing at this time.  He is scheduled for cardiovascular ultrasounds on 07/29/2024 at 10:00 a.m..  We do have venous ultrasounds on file, however, we do not have arterial.  Once arterial ultrasounds or clear we will begin compressing using Unna boots.    7/25/24 - The patient returns today for f/up on his bilateral lower extremity venous ulcers.  The wounds have all improved since his last visit on 7/17/24, however there is some slight erythema at the left lower extremity.  A culture was obtained of the left leg.  He is scheduled for bilateral venous/arterial US on 7/29/24.  However, he is concerned about using compression and wound like to discuss it with his cardiologist, Dr. Carter.   Once the U/S are done, if there is any venous reflux noted these will be sent to Dr. Carter for evaluation and consultation with the patient prior to compression.     7/31/24 - Mr. Farrell returns today for the bilateral lower extremity venous ulcers.  He did complete his cardiovascular U/S on 7/29/24.  Both his arterial and venous studies were normal.  He also had a wound culture done on 7/25/24 which shows 100% Staph aureus.  He was prescribed Doxycycline today x 10 days.  We will also be authorizing Dalvance infusion for him in treatment of Staph aureus.  We would like to begin compression with Unna Boots, however, the patient would first like to clear that with his cardiologist.  He does have an adequate EF.  He does have an appt with Dr. Carter (cardio) this afternoon at 2:30 to have a halter monitor placed due to his a-fib. He was given a copy of his vascular studies to review at that time and will discus compression. Today the wounds on the left leg were selectively debrided.  Santyl has been ordered and we discussed the cost.  If he is unable to obtain he will continue with Mesalt.     August 7, 2024:    84-year-old white male, she of edema in his legs, and some venous stasis ulcers.  The recent venous ultrasound showed good flow in all of his major veins.  There was no DVT.  His CHF appears to be well controlled.  He will an attempt at compression of both legs.  The venous stasis ulcers in his legs are somewhat stalled at this time.  There is a left lower medial venous stasis ulcer, with slough present.  It is slightly painful today.    August 9, 2024:  84-year-old white male, who is here for a nurse visit only, to have his dressings changed.  There are no new issues.    August 13, 2024:  84-year-old white male, who is here for follow up of his chronic venous insufficiency, left lower leg venous  stasis ulcer.  These wounds are all improving.  The wound on his right lower leg has just about healed.  The edema is much improved with Unna boots.  He does have compression stockings at home, for future needs.    August 16, 2024:  84-year-old white male, who is here for a nurse visit only, to have his Unna boot changed.  There are no new issues.  He has venous stasis ulcers.    8/21/24 - Patient is seen today for a nurse visit only for Unna boot change.  No issues or complaints were brought to this provider's attention during this visit.     August 23, 2024:  84-year-old white male, who is here for follow up of his chronic venous insufficiency, left calf venous stasis ulcer.  He has no open wounds on the right leg.  He will no longer need an Unna boot on the right leg.  The wound on the left calf is improving each week.    8/27/24 - Mr. Farrell is seen today for a nurse visit only for an Unna Boot change for the left venous stasis ulcer.  No issues or concerns were brought to this providers attention during this visit.     8/30/24 - The patient followup on the venous stasis ulcers at his left lower extremity.  Today, that wound was quite macerated.  He does attempt to elevate as much as possible, however, his son is in the nursing facility and so he does been a lot of time there with the legs in a dependent position.  Today, we will change from silver alginate to Polymem with the hopes that that product may hold the moisture a little better.  He will continue with the Unna boots and twice weekly visits as well.  Of note, compression stockings were ordered on Tuesday 8/27/24, he has not received them yet, we will check on this order. He has blood work and a follow up with Dr. Carter (cardio) in Novemeber 9/17/24 - Mr. Farrell is seen today for a nurse visit only for an Unna Boot change for the left venous stasis ulcer.  No issues or concerns were brought to this providers attention during this visit.     Review of  Systems   Constitutional: Negative.    Respiratory: Negative.     Cardiovascular: Negative.    Skin:         As documented in the HPI.   All other systems reviewed and are negative.        Objective:        Vitals:    09/20/24 0800   BP: 132/71   Pulse: 108   Resp: 18           Physical Exam  Vitals and nursing note reviewed.   Constitutional:       Appearance: Normal appearance.   Cardiovascular:      Rate and Rhythm: Normal rate.   Pulmonary:      Effort: Pulmonary effort is normal.   Skin:     General: Skin is warm and dry.      Comments: Venous stasis ulcer on the left calf has improved considerably.  I did a mechanical debridement only.   Neurological:      Mental Status: He is alert.            Wound 07/10/24 1154 Venous Ulcer Left lower;medial Leg #2 (Active)   07/10/24 1154   Present on Original Admission: Y   Primary Wound Type: Venous ulcer   Side: Left   Orientation: lower;medial   Location: Leg   Wound Approximate Age at First Assessment (Weeks):    Wound Number: #2   Is this injury device related?:    Incision Type:    Closure Method:    Wound Description (Comments):    Type:    Additional Comments:    Ankle-Brachial Index:    Pulses:    Removal Indication and Assessment:    Wound Outcome:    Dressing Appearance Moist drainage 09/20/24 0806   Drainage Amount Moderate 09/20/24 0806   Drainage Characteristics/Odor Serosanguineous 09/20/24 0806   Appearance Red;Moist;Granulating 09/20/24 0806   Tissue loss description Full thickness 09/20/24 0806   Periwound Area Dry;Edematous;Intact 09/20/24 0806   Wound Edges Open 09/20/24 0806   Wound Length (cm) 0.9 cm 09/20/24 0806   Wound Width (cm) 0.8 cm 09/20/24 0806   Wound Depth (cm) 0.2 cm 09/20/24 0806   Wound Volume (cm^3) 0.144 cm^3 09/20/24 0806   Wound Surface Area (cm^2) 0.72 cm^2 09/20/24 0806   Care Cleansed with:;Wound cleanser 09/20/24 0806   Dressing Applied 09/20/24 0806   Dressing Change Due 09/24/24 09/20/24 0806     9/17/24    Left medial lower  "leg   silver alginate, zinc unna boot, kerlix, coban   CT    9/20/24    Left lower medial leg (pre)  (Silver alginate, zinc unna boot, kerlix, coban)        Assessment:         ICD-10-CM ICD-9-CM   1. Venous stasis ulcer of left calf limited to breakdown of skin without varicose veins  I87.2 459.81    L97.221 707.12   2. Chronic venous insufficiency  I87.2 459.81           Procedures:     Mechanical only    [] Yes [] No   I & D performed  [] Yes [] No   Excisional debridement performed  [] Yes [] No   Selective debridement performed           [x] Yes [] No   Mechanical debridement performed         [] Yes [] No  Silver nitrate applied                                     [] Yes [] No  Labs ordered this visit                                  [] Yes [] No   Imaging ordered this visit                           [] Yes [] No   Tissue pathology and/or culture taken         MEDICATIONS    Current Outpatient Medications:     LASIX 40 mg tablet, Take 40 mg by mouth., Disp: , Rfl:     metoprolol succinate (TOPROL-XL) 25 MG 24 hr tablet, Take 50 mg by mouth 2 (two) times daily., Disp: , Rfl:     potassium chloride SA (K-DUR,KLOR-CON) 20 MEQ tablet, Take 20 mEq by mouth., Disp: , Rfl:     rosuvastatin (CRESTOR) 20 MG tablet, Take 20 mg by mouth every evening., Disp: , Rfl:     tamsulosin (FLOMAX) 0.4 mg Cap, Take 0.4 mg by mouth., Disp: , Rfl:     XARELTO 20 mg Tab, Take 20 mg by mouth., Disp: , Rfl:     mupirocin (BACTROBAN) 2 % ointment, , Disp: , Rfl:     SSD 1 % cream, , Disp: , Rfl:  Review of patient's allergies indicates:  No Known Allergies    DIAGNOSTICS    Labs/Scans/Micro:    CBC:   Lab Results   Component Value Date    WBC 3.88 (L) 09/11/2023    HGB 12.8 (L) 09/11/2023    HCT 39.7 (L) 09/11/2023    MCV 90.8 09/11/2023     09/11/2023     Sedimentation rate: No results found for: "SEDRATE"     C-reactive protein: No results found for: "CRP"     Chemistry: 3/12/24  Comprehensive Metabolic Panel       Component " "Ref Range & Units 4 mo ago  (3/12/24)   Sodium 136 - 145 mmol/L 136   Potassium 3.5 - 5.1 mmol/L 4.2   Chloride 98 - 107 mmol/L 100   CO2 23 - 31 mmol/L 27   Glucose 82 - 115 mg/dL 105   Blood Urea Nitrogen 8.4 - 25.7 mg/dL 21.0   Creatinine 0.73 - 1.18 mg/dL 0.82   Calcium 8.8 - 10.0 mg/dL 9.6   Protein Total 5.8 - 7.6 gm/dL 7.4   Albumin 3.4 - 4.8 g/dL 3.8   Globulin 2.4 - 3.5 gm/dL 3.6 High    Albumin/Globulin Ratio 1.1 - 2.0 ratio 1.1   Bilirubin Total <=1.5 mg/dL 0.5   ALP 40 - 150 unit/L 127   ALT 0 - 55 unit/L 13   AST 5 - 34 unit/L 28   eGFR mls/min/1.73/m2 >60   Resulting Agency  [facility]        HBA1C: No components found for: "HBA1C"    Ankle Brachial Index: 7/11/24 -   Right - 1.34  Left - 1.29    Imaging:    Plain film: No results found for this or any previous visit.    MRI: No results found for this or any previous visit.    Bone Scan: No results found for this or any previous visit.    Vascular studies:    7/11/24 -   Arterial:  FINDINGS:  Ultrasound Doppler and color flow imaging were performed on the arteries of the left lower extremity.  A tri phasic flow wave pattern is evident throughout the visualized arteries of the left lower extremity no significant localized atherosclerotic plaque formation or focal stenosis noted to the visualized arteries of the left lower extremity.  The right ankle brachial index is 1.34 and the left is 1.29.  Impression:  1.  No significant localized atherosclerotic plaque formation or focal stenosis noted to the visualized arteries of theleft lower extremity.    Venous:  FINDINGS:  There is normal compression and flow noted in the  common femoral vein, superficial femoral vein, popliteal vein, and  posterior tibial veinbilaterally.  No evidence of deep venous thrombosis is identified. No significant venous reflux is identified.  The left greater saphenous vein has been surgically harvested.  Impression:  1. No deep venous thrombosis identified to the lower extremities " bilaterally.    Microbiology:   7/24/24 - Staph Aureus - Doxy Rx sent        HOME HEALTH AGENCY:  none  WOUND CARE ORDERS:  Left medial lower leg wound: Cleanse with wound cleanser, apply silver alginate, zic unna boot, kerlix, and coban to be changed on Tuesday and Fridays   Right anterior lower leg wound: Keep clean and dry, wear compression socks daily    Follow up in 4 days (on 9/24/2024) for unna boot/left leg.

## 2024-09-24 ENCOUNTER — HOSPITAL ENCOUNTER (OUTPATIENT)
Dept: WOUND CARE | Facility: HOSPITAL | Age: 84
Discharge: HOME | End: 2024-09-24
Attending: NURSE PRACTITIONER
Payer: MEDICARE

## 2024-09-24 VITALS
SYSTOLIC BLOOD PRESSURE: 118 MMHG | RESPIRATION RATE: 18 BRPM | BODY MASS INDEX: 33.48 KG/M2 | WEIGHT: 274.94 LBS | DIASTOLIC BLOOD PRESSURE: 64 MMHG | HEART RATE: 112 BPM | HEIGHT: 76 IN

## 2024-09-24 DIAGNOSIS — L97.221 VENOUS STASIS ULCER OF LEFT CALF LIMITED TO BREAKDOWN OF SKIN WITHOUT VARICOSE VEINS: Primary | ICD-10-CM

## 2024-09-24 DIAGNOSIS — I87.2 VENOUS STASIS ULCER OF LEFT CALF LIMITED TO BREAKDOWN OF SKIN WITHOUT VARICOSE VEINS: Primary | ICD-10-CM

## 2024-09-24 DIAGNOSIS — I87.2 CHRONIC VENOUS INSUFFICIENCY: ICD-10-CM

## 2024-09-24 PROCEDURE — 29580 STRAPPING UNNA BOOT: CPT

## 2024-09-24 PROCEDURE — 27000999 HC MEDICAL RECORD PHOTO DOCUMENTATION

## 2024-09-24 PROCEDURE — 99213 OFFICE O/P EST LOW 20 MIN: CPT

## 2024-09-24 NOTE — PROGRESS NOTES
[facility]: None  Smoker   [] Yes   [x] No   Diabetic  [] Yes  [x] No   Vascular Surgeon: Dr. Carter  Vascular procedures [] Yes [x] No   If so, when and what was done?  Recent Ultrasounds [x] Yes [] No   If so, when were they done? (7/29/24 - venous/arterial)  Compression Pumps  [] Yes [x] No       Right ankle - 26.5 cm     Right calf - 45.2 cm     Left ankle - 25.5 cm     Left calf - 42.4 cm  Doppler done in office? [x] Yes [] No   Date: 7/10/24     Right dorsalis: monophasic     Right posterior: biphasic     Left dorsalis: biphasic     Left posterior: abnormal  Is the patient eligible for a graft, and/or currently grafting? [] Yes [] No    If so, which one/size?        Subjective:       Patient ID: Piter Farrell is a 84 y.o. male.    Chief Complaint: Venous Ulcer ( (Left medial lower leg))    HPI    September 20, 2024:  84-year-old white male, who is here for follow up of the left calf venous stasis ulcer.  It is measuring smaller today.  He has been using an Unna boot.  The maceration around the edges is much improved.    September 13, 2024:  84-year-old white male, who is here for follow up of the left calf venous stasis ulcer.  The measurements are improving, but there still maceration around the edges.  He has been using Polymem.  The edema has greatly improved.  He is also using an Unna boot on the left leg at this time.    9/10/24 - Mr. Farrell is seen today for a nurse visit only for an Unna Boot change for the left venous stasis ulcer.  No issues or concerns were brought to this providers attention during this visit.     September 6, 2024:  84-year-old white male, who is here for follow up of his left venous stasis ulcer, chronic venous insufficiency.  The venous stasis ulcer is improving, with regard to measurements.  He has been using Polymem.  There still some maceration around the edges.  He is using boot on the left leg twice a week.    9/3/24 - The patient is seen today for a nurse visit only for an  Unna Boot change for the left venous stasis ulcer.  No issues or concerns were brought to this providers attention during this visit.     7/10/24 - Mr. Farrell is an 84 year old  male who presents with bilateral lower extremity venous ulcers.  His PCP is Dr. Jesus Hernandez, who referred him to wound clinic.    4/16/24: saw PCP for multiple sores on lower extremities, was given Augmentin 875 mg BID and Bumetanide (no culture was obtained).  4/25/24: saw PCP again for bacterial infection of lower extremities, refilled Augmentin 875 mg BID for 5 days (no culture was obtained)  5/2/24: saw PCP for left leg, was given Silvadine  5/28/24: saw PCP, suggested that he use compression stockings and follow up with his cardiologist (Dr. Carter), he did not do either of these things  7/9/24: saw PCP given Lasix, Cefzil and referred to wound care  He has a history of triple bypass and CHF.   He states that he had venous US done on his legs about 1 month ago with Dr. Carter.  These results were reviewed.  No arterial U/S were performed and the study shows that it was incomplete due to patient resistance.   He is not a diabetic.   He states that he was seen in our clinic about 3 years ago for the same issue.    Today his bilateral lower extremities have multiple open areas that do appear venous in nature.  Because his venous ultrasounds were in conclusive an incomplete we will be ordering full cardiovascular ultrasounds.  He does have 2+ edema as well as hemosiderin staining.  We would like to compress, however, until we have arterial clearing we are hesitant to do so as this has not been addressed.  We did have a lengthy discussion today regarding his congestive heart failure, the need to compress, the impact that compression may have on his CHF due to moving the fluid from his legs to his chest, as well as the need to verify his arterial status prior to compressing.  He is in agreement with all of this and today we will  begin covering the open ulcers and applying a Tubigrip over the Kerlix to hold the dressings in place.  He was instructed that he should alternate short elevation of the legs with dependent hanging of the legs so that blood flow can continue to the feet but at the same time tried to begin offloading some of the legs.  He is ambulatory and therefore would be able to wear an Unna boot wound we are at that point.  He does been the majority of his time at a local skilled nursing facility with his handicapped son.   All 10 toe nails today were quite lengthy and were trimmed for the patient.    7/17/24 - Mr. Farrell is here today for followup on his bilateral lower extremity venous ulcers as well as a small wound to the right 2nd toe.  The right 2nd toe was assessed and remains stable.  The scattered wounds at the lower extremities were selectively debrided.  They are beginning to make progress and there are couple of areas were epithelial tissue is beginning to my great from the edges.  He has completed his p.o. Cefzil.  We are anxious to begin compressing the lower extremities, however, we do not have arterial clearing at this time.  He is scheduled for cardiovascular ultrasounds on 07/29/2024 at 10:00 a.m..  We do have venous ultrasounds on file, however, we do not have arterial.  Once arterial ultrasounds or clear we will begin compressing using Unna boots.    7/25/24 - The patient returns today for f/up on his bilateral lower extremity venous ulcers.  The wounds have all improved since his last visit on 7/17/24, however there is some slight erythema at the left lower extremity.  A culture was obtained of the left leg.  He is scheduled for bilateral venous/arterial US on 7/29/24. However, he is concerned about using compression and wound like to discuss it with his cardiologist, Dr. Carter.   Once the U/S are done, if there is any venous reflux noted these will be sent to Dr. Carter for evaluation and consultation with  the patient prior to compression.     7/31/24 - Mr. Farrell returns today for the bilateral lower extremity venous ulcers.  He did complete his cardiovascular U/S on 7/29/24.  Both his arterial and venous studies were normal.  He also had a wound culture done on 7/25/24 which shows 100% Staph aureus.  He was prescribed Doxycycline today x 10 days.  We will also be authorizing Dalvance infusion for him in treatment of Staph aureus.  We would like to begin compression with Unna Boots, however, the patient would first like to clear that with his cardiologist.  He does have an adequate EF.  He does have an appt with Dr. Carter (cardio) this afternoon at 2:30 to have a halter monitor placed due to his a-fib. He was given a copy of his vascular studies to review at that time and will discus compression. Today the wounds on the left leg were selectively debrided.  Santyl has been ordered and we discussed the cost.  If he is unable to obtain he will continue with Mesalt.     August 7, 2024:    84-year-old white male, she of edema in his legs, and some venous stasis ulcers.  The recent venous ultrasound showed good flow in all of his major veins.  There was no DVT.  His CHF appears to be well controlled.  He will an attempt at compression of both legs.  The venous stasis ulcers in his legs are somewhat stalled at this time.  There is a left lower medial venous stasis ulcer, with slough present.  It is slightly painful today.    August 9, 2024:  84-year-old white male, who is here for a nurse visit only, to have his dressings changed.  There are no new issues.    August 13, 2024:  84-year-old white male, who is here for follow up of his chronic venous insufficiency, left lower leg venous stasis ulcer.  These wounds are all improving.  The wound on his right lower leg has just about healed.  The edema is much improved with Unna boots.  He does have compression stockings at home, for future needs.    August 16, 2024:  84-year-old  white male, who is here for a nurse visit only, to have his Unna boot changed.  There are no new issues.  He has venous stasis ulcers.    8/21/24 - Patient is seen today for a nurse visit only for Unna boot change.  No issues or complaints were brought to this provider's attention during this visit.     August 23, 2024:  84-year-old white male, who is here for follow up of his chronic venous insufficiency, left calf venous stasis ulcer.  He has no open wounds on the right leg.  He will no longer need an Unna boot on the right leg.  The wound on the left calf is improving each week.    8/27/24 - Mr. Farrell is seen today for a nurse visit only for an Unna Boot change for the left venous stasis ulcer.  No issues or concerns were brought to this providers attention during this visit.     8/30/24 - The patient followup on the venous stasis ulcers at his left lower extremity.  Today, that wound was quite macerated.  He does attempt to elevate as much as possible, however, his son is in the nursing facility and so he does been a lot of time there with the legs in a dependent position.  Today, we will change from silver alginate to Polymem with the hopes that that product may hold the moisture a little better.  He will continue with the Unna boots and twice weekly visits as well.  Of note, compression stockings were ordered on Tuesday 8/27/24, he has not received them yet, we will check on this order. He has blood work and a follow up with Dr. Carter (cardio) in [facility]    9/17/24 - Mr. Farrell is seen today for a nurse visit only for an Unna Boot change for the left venous stasis ulcer.  No issues or concerns were brought to this providers attention during this visit.     9/24/24 - Mr. Farrell is seen today for a nurse visit only for an Unna Boot change for the left venous stasis ulcer.  No issues or concerns were brought to this providers attention during this visit.     Review of Systems   Constitutional: Negative.     Respiratory: Negative.     Cardiovascular: Negative.    Skin:         As documented in the HPI.   All other systems reviewed and are negative.        Objective:        Vitals:    09/24/24 0800   BP: 118/64   Pulse: (!) 112   Resp: 18           Physical Exam  Vitals and nursing note reviewed.   Constitutional:       Appearance: Normal appearance.   Cardiovascular:      Rate and Rhythm: Normal rate.   Pulmonary:      Effort: Pulmonary effort is normal.   Skin:     General: Skin is warm and dry.      Comments: Venous stasis ulcer on the left calf    Neurological:      Mental Status: He is alert.            Wound 07/10/24 1154 Venous Ulcer Left lower;medial Leg #2 (Active)   07/10/24 1154   Present on Original Admission: Y   Primary Wound Type: Venous ulcer   Side: Left   Orientation: lower;medial   Location: Leg   Wound Approximate Age at First Assessment (Weeks):    Wound Number: #2   Is this injury device related?:    Incision Type:    Closure Method:    Wound Description (Comments):    Type:    Additional Comments:    Ankle-Brachial Index:    Pulses:    Removal Indication and Assessment:    Wound Outcome:    Dressing Appearance Moist drainage 09/24/24 0801   Drainage Amount Moderate 09/24/24 0801   Drainage Characteristics/Odor Serosanguineous 09/24/24 0801   Appearance Red;Moist;Granulating 09/24/24 0801   Tissue loss description Full thickness 09/24/24 0801   Periwound Area Dry 09/24/24 0801   Wound Edges Open 09/24/24 0801   Wound Length (cm) 0.8 cm 09/24/24 0801   Wound Width (cm) 0.9 cm 09/24/24 0801   Wound Depth (cm) 0.2 cm 09/24/24 0801   Wound Volume (cm^3) 0.144 cm^3 09/24/24 0801   Wound Surface Area (cm^2) 0.72 cm^2 09/24/24 0801   Care Cleansed with:;Wound cleanser 09/24/24 0801   Dressing Applied 09/24/24 0801   Dressing Change Due 09/27/24 09/24/24 0801     9/24/24        Left lower medial leg (pre)                            Right lower anterior leg (1 x 0.5 x 0.2)  (Silver alginate, zinc unna  "boot, kerlix, coban)          Assessment:         ICD-10-CM ICD-9-CM   1. Venous stasis ulcer of left calf limited to breakdown of skin without varicose veins  I87.2 459.81    L97.221 707.12   2. Chronic venous insufficiency  I87.2 459.81           Procedures:     Nurse visit only    [] Yes [] No   I & D performed  [] Yes [] No   Excisional debridement performed  [] Yes [] No   Selective debridement performed           [] Yes [] No   Mechanical debridement performed         [] Yes [] No  Silver nitrate applied                                     [] Yes [] No  Labs ordered this visit                                  [] Yes [] No   Imaging ordered this visit                           [] Yes [] No   Tissue pathology and/or culture taken         MEDICATIONS    Current Outpatient Medications:     LASIX 40 mg tablet, Take 40 mg by mouth., Disp: , Rfl:     metoprolol succinate (TOPROL-XL) 25 MG 24 hr tablet, Take 50 mg by mouth 2 (two) times daily., Disp: , Rfl:     potassium chloride SA (K-DUR,KLOR-CON) 20 MEQ tablet, Take 20 mEq by mouth., Disp: , Rfl:     rosuvastatin (CRESTOR) 20 MG tablet, Take 20 mg by mouth every evening., Disp: , Rfl:     tamsulosin (FLOMAX) 0.4 mg Cap, Take 0.4 mg by mouth., Disp: , Rfl:     XARELTO 20 mg Tab, Take 20 mg by mouth., Disp: , Rfl:     mupirocin (BACTROBAN) 2 % ointment, , Disp: , Rfl:     SSD 1 % cream, , Disp: , Rfl:  Review of patient's allergies indicates:  No Known Allergies    DIAGNOSTICS    Labs/Scans/Micro:    CBC:   Lab Results   Component Value Date    WBC 3.88 (L) 09/11/2023    HGB 12.8 (L) 09/11/2023    HCT 39.7 (L) 09/11/2023    MCV 90.8 09/11/2023     09/11/2023     Sedimentation rate: No results found for: "SEDRATE"     C-reactive protein: No results found for: "CRP"     Chemistry: 3/12/24  Comprehensive Metabolic Panel       Component Ref Range & Units 4 mo ago  (3/12/24)   Sodium 136 - 145 mmol/L 136   Potassium 3.5 - 5.1 mmol/L 4.2   Chloride 98 - 107 mmol/L " "100   CO2 23 - 31 mmol/L 27   Glucose 82 - 115 mg/dL 105   Blood Urea Nitrogen 8.4 - 25.7 mg/dL 21.0   Creatinine 0.73 - 1.18 mg/dL 0.82   Calcium 8.8 - 10.0 mg/dL 9.6   Protein Total 5.8 - 7.6 gm/dL 7.4   Albumin 3.4 - 4.8 g/dL 3.8   Globulin 2.4 - 3.5 gm/dL 3.6 High    Albumin/Globulin Ratio 1.1 - 2.0 ratio 1.1   Bilirubin Total <=1.5 mg/dL 0.5   ALP 40 - 150 unit/L 127   ALT 0 - 55 unit/L 13   AST 5 - 34 unit/L 28   eGFR mls/min/1.73/m2 >60   Resulting Agency  [facility]        HBA1C: No components found for: "HBA1C"    Ankle Brachial Index: 7/11/24 -   Right - 1.34  Left - 1.29    Imaging:    Plain film: No results found for this or any previous visit.    MRI: No results found for this or any previous visit.    Bone Scan: No results found for this or any previous visit.    Vascular studies:    7/11/24 -   Arterial:  FINDINGS:  Ultrasound Doppler and color flow imaging were performed on the arteries of the left lower extremity.  A tri phasic flow wave pattern is evident throughout the visualized arteries of the left lower extremity no significant localized atherosclerotic plaque formation or focal stenosis noted to the visualized arteries of the left lower extremity.  The right ankle brachial index is 1.34 and the left is 1.29.  Impression:  1.  No significant localized atherosclerotic plaque formation or focal stenosis noted to the visualized arteries of theleft lower extremity.    Venous:  FINDINGS:  There is normal compression and flow noted in the  common femoral vein, superficial femoral vein, popliteal vein, and  posterior tibial veinbilaterally.  No evidence of deep venous thrombosis is identified. No significant venous reflux is identified.  The left greater saphenous vein has been surgically harvested.  Impression:  1. No deep venous thrombosis identified to the lower extremities bilaterally.    Microbiology:   7/24/24 - Staph Aureus - Doxy Rx sent        HOME HEALTH AGENCY:  none  WOUND CARE ORDERS:  Left " medial lower leg wound: Cleanse with wound cleanser, apply silver alginate, zic unna boot, kerlix, and coban to be changed on Tuesday and Fridays   Right anterior lower leg wound: Keep clean and dry, wear compression socks daily    Follow up in 3 days (on 9/27/2024) for unna boot.

## 2024-09-27 ENCOUNTER — HOSPITAL ENCOUNTER (OUTPATIENT)
Dept: WOUND CARE | Facility: HOSPITAL | Age: 84
Discharge: HOME | End: 2024-09-27
Attending: FAMILY MEDICINE
Payer: MEDICARE

## 2024-09-27 VITALS
WEIGHT: 274.94 LBS | RESPIRATION RATE: 18 BRPM | BODY MASS INDEX: 33.48 KG/M2 | SYSTOLIC BLOOD PRESSURE: 118 MMHG | HEART RATE: 90 BPM | HEIGHT: 76 IN | DIASTOLIC BLOOD PRESSURE: 77 MMHG

## 2024-09-27 DIAGNOSIS — I87.2 CHRONIC VENOUS INSUFFICIENCY: ICD-10-CM

## 2024-09-27 DIAGNOSIS — L97.221 VENOUS STASIS ULCER OF LEFT CALF LIMITED TO BREAKDOWN OF SKIN WITHOUT VARICOSE VEINS: Primary | ICD-10-CM

## 2024-09-27 DIAGNOSIS — I87.2 VENOUS STASIS ULCER OF LEFT CALF LIMITED TO BREAKDOWN OF SKIN WITHOUT VARICOSE VEINS: Primary | ICD-10-CM

## 2024-09-27 PROCEDURE — 99212 OFFICE O/P EST SF 10 MIN: CPT

## 2024-09-27 PROCEDURE — 29580 STRAPPING UNNA BOOT: CPT

## 2024-09-27 PROCEDURE — 27000999 HC MEDICAL RECORD PHOTO DOCUMENTATION

## 2024-09-27 PROCEDURE — 99211 OFF/OP EST MAY X REQ PHY/QHP: CPT

## 2024-09-27 NOTE — PROGRESS NOTES
[facility]: None  Smoker   [] Yes   [x] No   Diabetic  [] Yes  [x] No   Vascular Surgeon: Dr. Carter  Vascular procedures [] Yes [x] No   If so, when and what was done?  Recent Ultrasounds [x] Yes [] No   If so, when were they done? (7/29/24 - venous/arterial)  Compression Pumps  [] Yes [x] No       Right ankle - 26.5 cm     Right calf - 44 cm     Left ankle - 26.5 cm     Left calf - 43 cm  Doppler done in office? [x] Yes [] No   Date: 7/10/24     Right dorsalis: monophasic     Right posterior: biphasic     Left dorsalis: biphasic     Left posterior: abnormal  Is the patient eligible for a graft, and/or currently grafting? [] Yes [] No    If so, which one/size?      Subjective:       Patient ID: Piter Farrell is a 84 y.o. male.    Chief Complaint: Venous Ulcer (Left medial lower leg )    HPI    September 27, 2024:  84-year-old white male, is here for follow up of the left venous stasis ulcer, chronic venous insufficiency.  He has been using a Tubigrip on the right leg, and Unna boot on the left leg.  The venous stasis ulcer on the left calf is measuring the same as last week, but it is very clean.    September 20, 2024:  84-year-old white male, who is here for follow up of the left calf venous stasis ulcer.  It is measuring smaller today.  He has been using an Unna boot.  The maceration around the edges is much improved.    September 13, 2024:  84-year-old white male, who is here for follow up of the left calf venous stasis ulcer.  The measurements are improving, but there still maceration around the edges.  He has been using Polymem.  The edema has greatly improved.  He is also using an Unna boot on the left leg at this time.    9/10/24 - Mr. Farrell is seen today for a nurse visit only for an Unna Boot change for the left venous stasis ulcer.  No issues or concerns were brought to this providers attention during this visit.     September 6, 2024:  84-year-old white male, who is here for follow up of his left venous  stasis ulcer, chronic venous insufficiency.  The venous stasis ulcer is improving, with regard to measurements.  He has been using Polymem.  There still some maceration around the edges.  He is using boot on the left leg twice a week.    9/3/24 - The patient is seen today for a nurse visit only for an Unna Boot change for the left venous stasis ulcer.  No issues or concerns were brought to this providers attention during this visit.     7/10/24 - Mr. Farrell is an 84 year old  male who presents with bilateral lower extremity venous ulcers.  His PCP is Dr. Jesus Hernandez, who referred him to wound clinic.    4/16/24: saw PCP for multiple sores on lower extremities, was given Augmentin 875 mg BID and Bumetanide (no culture was obtained).  4/25/24: saw PCP again for bacterial infection of lower extremities, refilled Augmentin 875 mg BID for 5 days (no culture was obtained)  5/2/24: saw PCP for left leg, was given Silvadine  5/28/24: saw PCP, suggested that he use compression stockings and follow up with his cardiologist (Dr. Carter), he did not do either of these things  7/9/24: saw PCP given Lasix, Cefzil and referred to wound care  He has a history of triple bypass and CHF.   He states that he had venous US done on his legs about 1 month ago with Dr. Carter.  These results were reviewed.  No arterial U/S were performed and the study shows that it was incomplete due to patient resistance.   He is not a diabetic.   He states that he was seen in our clinic about 3 years ago for the same issue.    Today his bilateral lower extremities have multiple open areas that do appear venous in nature.  Because his venous ultrasounds were in conclusive an incomplete we will be ordering full cardiovascular ultrasounds.  He does have 2+ edema as well as hemosiderin staining.  We would like to compress, however, until we have arterial clearing we are hesitant to do so as this has not been addressed.  We did have a lengthy  discussion today regarding his congestive heart failure, the need to compress, the impact that compression may have on his CHF due to moving the fluid from his legs to his chest, as well as the need to verify his arterial status prior to compressing.  He is in agreement with all of this and today we will begin covering the open ulcers and applying a Tubigrip over the Kerlix to hold the dressings in place.  He was instructed that he should alternate short elevation of the legs with dependent hanging of the legs so that blood flow can continue to the feet but at the same time tried to begin offloading some of the legs.  He is ambulatory and therefore would be able to wear an Unna boot wound we are at that point.  He does been the majority of his time at a local skilled nursing facility with his handicapped son.   All 10 toe nails today were quite lengthy and were trimmed for the patient.    7/17/24 - Mr. Farrell is here today for followup on his bilateral lower extremity venous ulcers as well as a small wound to the right 2nd toe.  The right 2nd toe was assessed and remains stable.  The scattered wounds at the lower extremities were selectively debrided.  They are beginning to make progress and there are couple of areas were epithelial tissue is beginning to my great from the edges.  He has completed his p.o. Cefzil.  We are anxious to begin compressing the lower extremities, however, we do not have arterial clearing at this time.  He is scheduled for cardiovascular ultrasounds on 07/29/2024 at 10:00 a.m..  We do have venous ultrasounds on file, however, we do not have arterial.  Once arterial ultrasounds or clear we will begin compressing using Unna boots.    7/25/24 - The patient returns today for f/up on his bilateral lower extremity venous ulcers.  The wounds have all improved since his last visit on 7/17/24, however there is some slight erythema at the left lower extremity.  A culture was obtained of the left leg.   He is scheduled for bilateral venous/arterial US on 7/29/24. However, he is concerned about using compression and wound like to discuss it with his cardiologist, Dr. Carter.   Once the U/S are done, if there is any venous reflux noted these will be sent to Dr. Carter for evaluation and consultation with the patient prior to compression.     7/31/24 - Mr. Farrell returns today for the bilateral lower extremity venous ulcers.  He did complete his cardiovascular U/S on 7/29/24.  Both his arterial and venous studies were normal.  He also had a wound culture done on 7/25/24 which shows 100% Staph aureus.  He was prescribed Doxycycline today x 10 days.  We will also be authorizing Dalvance infusion for him in treatment of Staph aureus.  We would like to begin compression with Unna Boots, however, the patient would first like to clear that with his cardiologist.  He does have an adequate EF.  He does have an appt with Dr. Carter (cardio) this afternoon at 2:30 to have a halter monitor placed due to his a-fib. He was given a copy of his vascular studies to review at that time and will discus compression. Today the wounds on the left leg were selectively debrided.  Santyl has been ordered and we discussed the cost.  If he is unable to obtain he will continue with Mesalt.     August 7, 2024:    84-year-old white male, she of edema in his legs, and some venous stasis ulcers.  The recent venous ultrasound showed good flow in all of his major veins.  There was no DVT.  His CHF appears to be well controlled.  He will an attempt at compression of both legs.  The venous stasis ulcers in his legs are somewhat stalled at this time.  There is a left lower medial venous stasis ulcer, with slough present.  It is slightly painful today.    August 9, 2024:  84-year-old white male, who is here for a nurse visit only, to have his dressings changed.  There are no new issues.    August 13, 2024:  84-year-old white male, who is here for follow  up of his chronic venous insufficiency, left lower leg venous stasis ulcer.  These wounds are all improving.  The wound on his right lower leg has just about healed.  The edema is much improved with Unna boots.  He does have compression stockings at home, for future needs.    August 16, 2024:  84-year-old white male, who is here for a nurse visit only, to have his Unna boot changed.  There are no new issues.  He has venous stasis ulcers.    8/21/24 - Patient is seen today for a nurse visit only for Unna boot change.  No issues or complaints were brought to this provider's attention during this visit.     August 23, 2024:  84-year-old white male, who is here for follow up of his chronic venous insufficiency, left calf venous stasis ulcer.  He has no open wounds on the right leg.  He will no longer need an Unna boot on the right leg.  The wound on the left calf is improving each week.    8/27/24 - Mr. Farrell is seen today for a nurse visit only for an Unna Boot change for the left venous stasis ulcer.  No issues or concerns were brought to this providers attention during this visit.     8/30/24 - The patient followup on the venous stasis ulcers at his left lower extremity.  Today, that wound was quite macerated.  He does attempt to elevate as much as possible, however, his son is in the nursing facility and so he does been a lot of time there with the legs in a dependent position.  Today, we will change from silver alginate to Polymem with the hopes that that product may hold the moisture a little better.  He will continue with the Unna boots and twice weekly visits as well.  Of note, compression stockings were ordered on Tuesday 8/27/24, he has not received them yet, we will check on this order. He has blood work and a follow up with Dr. Carter (cardio) in Novemeber 9/17/24 - Mr. Farrell is seen today for a nurse visit only for an Unna Boot change for the left venous stasis ulcer.  No issues or concerns were brought  to this providers attention during this visit.     9/24/24 - Mr. Farrell is seen today for a nurse visit only for an Unna Boot change for the left venous stasis ulcer.  No issues or concerns were brought to this providers attention during this visit.     Review of Systems   Constitutional: Negative.    Respiratory: Negative.     Cardiovascular: Negative.    Skin:         As documented in the HPI.   All other systems reviewed and are negative.        Objective:        Vitals:    09/27/24 0753   BP: 118/77   Pulse: 90   Resp: 18     Physical Exam  Vitals and nursing note reviewed.   Constitutional:       Appearance: Normal appearance.   Cardiovascular:      Rate and Rhythm: Normal rate.   Pulmonary:      Effort: Pulmonary effort is normal.   Skin:     General: Skin is warm and dry.      Comments: There is a small venous stasis ulcer on the left calf, which is measuring the same.  There is no slough present.  I did a mechanical debridement only.   Neurological:      Mental Status: He is alert.            Wound 07/10/24 1154 Venous Ulcer Left lower;medial Leg #2 (Active)   07/10/24 1154   Present on Original Admission: Y   Primary Wound Type: Venous ulcer   Side: Left   Orientation: lower;medial   Location: Leg   Wound Approximate Age at First Assessment (Weeks):    Wound Number: #2   Is this injury device related?:    Incision Type:    Closure Method:    Wound Description (Comments):    Type:    Additional Comments:    Ankle-Brachial Index:    Pulses:    Removal Indication and Assessment:    Wound Outcome:    Dressing Appearance Moist drainage 09/27/24 0757   Drainage Amount Moderate 09/27/24 0757   Drainage Characteristics/Odor Serosanguineous 09/27/24 0757   Appearance Red;Moist 09/27/24 0757   Tissue loss description Full thickness 09/27/24 0757   Periwound Area Dry 09/27/24 0757   Wound Edges Open 09/27/24 0757   Wound Length (cm) 0.9 cm 09/27/24 0757   Wound Width (cm) 1.2 cm 09/27/24 0757   Wound Depth (cm) 0.2 cm  09/27/24 0757   Wound Volume (cm^3) 0.216 cm^3 09/27/24 0757   Wound Surface Area (cm^2) 1.08 cm^2 09/27/24 0757   Care Cleansed with:;Wound cleanser 09/27/24 0757   Dressing Applied 09/27/24 0757   Compression Unna's Boot 09/27/24 0757   Dressing Change Due 10/01/24 09/27/24 0757   9/24/24        Left lower medial leg (pre)                            Right lower anterior leg (1 x 0.5 x 0.2)    09/27/24      Left medial lower leg                                   Right lower leg - monitoring   (Silver alginate, unna boot)   ML      Assessment:           ICD-10-CM ICD-9-CM   1. Venous stasis ulcer of left calf limited to breakdown of skin without varicose veins  I87.2 459.81    L97.221 707.12   2. Chronic venous insufficiency  I87.2 459.81           Procedures:       [] Yes [] No   I & D performed  [] Yes [] No   Excisional debridement performed  [] Yes [] No   Selective debridement performed           [x] Yes [] No   Mechanical debridement performed         [] Yes [] No  Silver nitrate applied                                     [] Yes [] No  Labs ordered this visit                                  [] Yes [] No   Imaging ordered this visit                           [] Yes [] No   Tissue pathology and/or culture taken         MEDICATIONS    Current Outpatient Medications:     LASIX 40 mg tablet, Take 40 mg by mouth., Disp: , Rfl:     metoprolol succinate (TOPROL-XL) 25 MG 24 hr tablet, Take 50 mg by mouth 2 (two) times daily., Disp: , Rfl:     potassium chloride SA (K-DUR,KLOR-CON) 20 MEQ tablet, Take 20 mEq by mouth., Disp: , Rfl:     rosuvastatin (CRESTOR) 20 MG tablet, Take 20 mg by mouth every evening., Disp: , Rfl:     tamsulosin (FLOMAX) 0.4 mg Cap, Take 0.4 mg by mouth., Disp: , Rfl:     XARELTO 20 mg Tab, Take 20 mg by mouth., Disp: , Rfl:     mupirocin (BACTROBAN) 2 % ointment, , Disp: , Rfl:     SSD 1 % cream, , Disp: , Rfl:  Review of patient's allergies indicates:  No Known  "Allergies    DIAGNOSTICS    Labs/Scans/Micro:    CBC:   Lab Results   Component Value Date    WBC 3.88 (L) 09/11/2023    HGB 12.8 (L) 09/11/2023    HCT 39.7 (L) 09/11/2023    MCV 90.8 09/11/2023     09/11/2023     Sedimentation rate: No results found for: "SEDRATE"     C-reactive protein: No results found for: "CRP"     Chemistry: 3/12/24  Comprehensive Metabolic Panel       Component Ref Range & Units 4 mo ago  (3/12/24)   Sodium 136 - 145 mmol/L 136   Potassium 3.5 - 5.1 mmol/L 4.2   Chloride 98 - 107 mmol/L 100   CO2 23 - 31 mmol/L 27   Glucose 82 - 115 mg/dL 105   Blood Urea Nitrogen 8.4 - 25.7 mg/dL 21.0   Creatinine 0.73 - 1.18 mg/dL 0.82   Calcium 8.8 - 10.0 mg/dL 9.6   Protein Total 5.8 - 7.6 gm/dL 7.4   Albumin 3.4 - 4.8 g/dL 3.8   Globulin 2.4 - 3.5 gm/dL 3.6 High    Albumin/Globulin Ratio 1.1 - 2.0 ratio 1.1   Bilirubin Total <=1.5 mg/dL 0.5   ALP 40 - 150 unit/L 127   ALT 0 - 55 unit/L 13   AST 5 - 34 unit/L 28   eGFR mls/min/1.73/m2 >60   Resulting Agency  [facility]        HBA1C: No components found for: "HBA1C"    Ankle Brachial Index: 7/11/24 -   Right - 1.34  Left - 1.29    Imaging:    Plain film: No results found for this or any previous visit.    MRI: No results found for this or any previous visit.    Bone Scan: No results found for this or any previous visit.    Vascular studies:    7/11/24 -   Arterial:  FINDINGS:  Ultrasound Doppler and color flow imaging were performed on the arteries of the left lower extremity.  A tri phasic flow wave pattern is evident throughout the visualized arteries of the left lower extremity no significant localized atherosclerotic plaque formation or focal stenosis noted to the visualized arteries of the left lower extremity.  The right ankle brachial index is 1.34 and the left is 1.29.  Impression:  1.  No significant localized atherosclerotic plaque formation or focal stenosis noted to the visualized arteries of theleft lower " extremity.    Venous:  FINDINGS:  There is normal compression and flow noted in the  common femoral vein, superficial femoral vein, popliteal vein, and  posterior tibial veinbilaterally.  No evidence of deep venous thrombosis is identified. No significant venous reflux is identified.  The left greater saphenous vein has been surgically harvested.  Impression:  1. No deep venous thrombosis identified to the lower extremities bilaterally.    Microbiology:   7/24/24 - Staph Aureus - Doxy Rx sent        HOME HEALTH AGENCY:  none  WOUND CARE ORDERS:  Left medial lower leg wound: Cleanse with wound cleanser, apply silver alginate, zic unna boot, kerlix, and coban to be changed on Tuesday and Fridays   Right anterior lower leg wound: Keep clean and dry, wear compression socks daily      Follow up in about 4 days (around 10/1/2024) for Provider Visit.

## 2024-10-01 ENCOUNTER — HOSPITAL ENCOUNTER (OUTPATIENT)
Dept: WOUND CARE | Facility: HOSPITAL | Age: 84
Discharge: HOME | End: 2024-10-01
Attending: FAMILY MEDICINE
Payer: MEDICARE

## 2024-10-01 VITALS
HEIGHT: 76 IN | WEIGHT: 274.94 LBS | DIASTOLIC BLOOD PRESSURE: 55 MMHG | BODY MASS INDEX: 33.48 KG/M2 | SYSTOLIC BLOOD PRESSURE: 108 MMHG | HEART RATE: 107 BPM | RESPIRATION RATE: 18 BRPM

## 2024-10-01 DIAGNOSIS — I87.2 CHRONIC VENOUS INSUFFICIENCY: ICD-10-CM

## 2024-10-01 DIAGNOSIS — L97.221 VENOUS STASIS ULCER OF LEFT CALF LIMITED TO BREAKDOWN OF SKIN WITHOUT VARICOSE VEINS: Primary | ICD-10-CM

## 2024-10-01 DIAGNOSIS — I87.2 VENOUS STASIS ULCER OF LEFT CALF LIMITED TO BREAKDOWN OF SKIN WITHOUT VARICOSE VEINS: Primary | ICD-10-CM

## 2024-10-01 PROCEDURE — 29580 STRAPPING UNNA BOOT: CPT

## 2024-10-01 PROCEDURE — 27000999 HC MEDICAL RECORD PHOTO DOCUMENTATION

## 2024-10-01 PROCEDURE — 99211 OFF/OP EST MAY X REQ PHY/QHP: CPT | Mod: 25

## 2024-10-01 NOTE — PROGRESS NOTES
[facility]: None  Smoker   [] Yes   [x] No   Diabetic  [] Yes  [x] No   Vascular Surgeon: Dr. Carter  Vascular procedures [] Yes [x] No   If so, when and what was done?  Recent Ultrasounds [x] Yes [] No   If so, when were they done? (7/29/24 - venous/arterial)  Compression Pumps  [] Yes [x] No       Right ankle - 26.5 cm     Right calf - 43.5 cm     Left ankle - 27 cm     Left calf - 44 cm  Doppler done in office? [x] Yes [] No   Date: 7/10/24     Right dorsalis: monophasic     Right posterior: biphasic     Left dorsalis: biphasic     Left posterior: abnormal  Is the patient eligible for a graft, and/or currently grafting? [] Yes [x] No    If so, which one/size?    Subjective:       Patient ID: Piter Farrell is a 84 y.o. male.    Chief Complaint: Venous Ulcer (Left medial lower leg)    HPI    September 27, 2024:  84-year-old white male, is here for follow up of the left venous stasis ulcer, chronic venous insufficiency.  He has been using a Tubigrip on the right leg, and Unna boot on the left leg.  The venous stasis ulcer on the left calf is measuring the same as last week, but it is very clean.    September 20, 2024:  84-year-old white male, who is here for follow up of the left calf venous stasis ulcer.  It is measuring smaller today.  He has been using an Unna boot.  The maceration around the edges is much improved.    September 13, 2024:  84-year-old white male, who is here for follow up of the left calf venous stasis ulcer.  The measurements are improving, but there still maceration around the edges.  He has been using Polymem.  The edema has greatly improved.  He is also using an Unna boot on the left leg at this time.    9/10/24 - Mr. Farrell is seen today for a nurse visit only for an Unna Boot change for the left venous stasis ulcer.  No issues or concerns were brought to this providers attention during this visit.     September 6, 2024:  84-year-old white male, who is here for follow up of his left venous  stasis ulcer, chronic venous insufficiency.  The venous stasis ulcer is improving, with regard to measurements.  He has been using Polymem.  There still some maceration around the edges.  He is using boot on the left leg twice a week.    9/3/24 - The patient is seen today for a nurse visit only for an Unna Boot change for the left venous stasis ulcer.  No issues or concerns were brought to this providers attention during this visit.     7/10/24 - Mr. Farrell is an 84 year old  male who presents with bilateral lower extremity venous ulcers.  His PCP is Dr. Jesus Hernandez, who referred him to wound clinic.    4/16/24: saw PCP for multiple sores on lower extremities, was given Augmentin 875 mg BID and Bumetanide (no culture was obtained).  4/25/24: saw PCP again for bacterial infection of lower extremities, refilled Augmentin 875 mg BID for 5 days (no culture was obtained)  5/2/24: saw PCP for left leg, was given Silvadine  5/28/24: saw PCP, suggested that he use compression stockings and follow up with his cardiologist (Dr. Carter), he did not do either of these things  7/9/24: saw PCP given Lasix, Cefzil and referred to wound care  He has a history of triple bypass and CHF.   He states that he had venous US done on his legs about 1 month ago with Dr. Carter.  These results were reviewed.  No arterial U/S were performed and the study shows that it was incomplete due to patient resistance.   He is not a diabetic.   He states that he was seen in our clinic about 3 years ago for the same issue.    Today his bilateral lower extremities have multiple open areas that do appear venous in nature.  Because his venous ultrasounds were in conclusive an incomplete we will be ordering full cardiovascular ultrasounds.  He does have 2+ edema as well as hemosiderin staining.  We would like to compress, however, until we have arterial clearing we are hesitant to do so as this has not been addressed.  We did have a lengthy  discussion today regarding his congestive heart failure, the need to compress, the impact that compression may have on his CHF due to moving the fluid from his legs to his chest, as well as the need to verify his arterial status prior to compressing.  He is in agreement with all of this and today we will begin covering the open ulcers and applying a Tubigrip over the Kerlix to hold the dressings in place.  He was instructed that he should alternate short elevation of the legs with dependent hanging of the legs so that blood flow can continue to the feet but at the same time tried to begin offloading some of the legs.  He is ambulatory and therefore would be able to wear an Unna boot wound we are at that point.  He does been the majority of his time at a local skilled nursing facility with his handicapped son.   All 10 toe nails today were quite lengthy and were trimmed for the patient.    7/17/24 - Mr. Farrell is here today for followup on his bilateral lower extremity venous ulcers as well as a small wound to the right 2nd toe.  The right 2nd toe was assessed and remains stable.  The scattered wounds at the lower extremities were selectively debrided.  They are beginning to make progress and there are couple of areas were epithelial tissue is beginning to my great from the edges.  He has completed his p.o. Cefzil.  We are anxious to begin compressing the lower extremities, however, we do not have arterial clearing at this time.  He is scheduled for cardiovascular ultrasounds on 07/29/2024 at 10:00 a.m..  We do have venous ultrasounds on file, however, we do not have arterial.  Once arterial ultrasounds or clear we will begin compressing using Unna boots.    7/25/24 - The patient returns today for f/up on his bilateral lower extremity venous ulcers.  The wounds have all improved since his last visit on 7/17/24, however there is some slight erythema at the left lower extremity.  A culture was obtained of the left leg.   He is scheduled for bilateral venous/arterial US on 7/29/24. However, he is concerned about using compression and wound like to discuss it with his cardiologist, Dr. Carter.   Once the U/S are done, if there is any venous reflux noted these will be sent to Dr. Carter for evaluation and consultation with the patient prior to compression.     7/31/24 - Mr. Farrell returns today for the bilateral lower extremity venous ulcers.  He did complete his cardiovascular U/S on 7/29/24.  Both his arterial and venous studies were normal.  He also had a wound culture done on 7/25/24 which shows 100% Staph aureus.  He was prescribed Doxycycline today x 10 days.  We will also be authorizing Dalvance infusion for him in treatment of Staph aureus.  We would like to begin compression with Unna Boots, however, the patient would first like to clear that with his cardiologist.  He does have an adequate EF.  He does have an appt with Dr. Carter (cardio) this afternoon at 2:30 to have a halter monitor placed due to his a-fib. He was given a copy of his vascular studies to review at that time and will discus compression. Today the wounds on the left leg were selectively debrided.  Santyl has been ordered and we discussed the cost.  If he is unable to obtain he will continue with Mesalt.     August 7, 2024:    84-year-old white male, she of edema in his legs, and some venous stasis ulcers.  The recent venous ultrasound showed good flow in all of his major veins.  There was no DVT.  His CHF appears to be well controlled.  He will an attempt at compression of both legs.  The venous stasis ulcers in his legs are somewhat stalled at this time.  There is a left lower medial venous stasis ulcer, with slough present.  It is slightly painful today.    August 9, 2024:  84-year-old white male, who is here for a nurse visit only, to have his dressings changed.  There are no new issues.    August 13, 2024:  84-year-old white male, who is here for follow  up of his chronic venous insufficiency, left lower leg venous stasis ulcer.  These wounds are all improving.  The wound on his right lower leg has just about healed.  The edema is much improved with Unna boots.  He does have compression stockings at home, for future needs.    August 16, 2024:  84-year-old white male, who is here for a nurse visit only, to have his Unna boot changed.  There are no new issues.  He has venous stasis ulcers.    8/21/24 - Patient is seen today for a nurse visit only for Unna boot change.  No issues or complaints were brought to this provider's attention during this visit.     August 23, 2024:  84-year-old white male, who is here for follow up of his chronic venous insufficiency, left calf venous stasis ulcer.  He has no open wounds on the right leg.  He will no longer need an Unna boot on the right leg.  The wound on the left calf is improving each week.    8/27/24 - Mr. Farrell is seen today for a nurse visit only for an Unna Boot change for the left venous stasis ulcer.  No issues or concerns were brought to this providers attention during this visit.     8/30/24 - The patient followup on the venous stasis ulcers at his left lower extremity.  Today, that wound was quite macerated.  He does attempt to elevate as much as possible, however, his son is in the nursing facility and so he does been a lot of time there with the legs in a dependent position.  Today, we will change from silver alginate to Polymem with the hopes that that product may hold the moisture a little better.  He will continue with the Unna boots and twice weekly visits as well.  Of note, compression stockings were ordered on Tuesday 8/27/24, he has not received them yet, we will check on this order. He has blood work and a follow up with Dr. Carter (cardio) in Novemeber 9/17/24 - Mr. Farrell is seen today for a nurse visit only for an Unna Boot change for the left venous stasis ulcer.  No issues or concerns were brought  to this providers attention during this visit.     9/24/24 - Mr. Farrell is seen today for a nurse visit only for an Unna Boot change for the left venous stasis ulcer.  No issues or concerns were brought to this providers attention during this visit.     10/1/24 - The patient was seen today for a nurse visit only for an Unna Boot change for the left venous stasis ulcer.  No issues or concerns were brought to this providers attention during this visit.     Review of Systems   Constitutional: Negative.    Respiratory: Negative.     Cardiovascular: Negative.    Skin:         As documented in the HPI.   All other systems reviewed and are negative.        Objective:        Vitals:    10/01/24 0746   BP: (!) 108/55   Pulse: 107   Resp: 18     Physical Exam  Vitals and nursing note reviewed.   Constitutional:       Appearance: Normal appearance.   Cardiovascular:      Rate and Rhythm: Normal rate.   Pulmonary:      Effort: Pulmonary effort is normal.   Skin:     General: Skin is warm and dry.      Comments: venous stasis ulcer on the left calf   Neurological:      Mental Status: He is alert.            Wound 07/10/24 1154 Venous Ulcer Left lower;medial Leg #2 (Active)   07/10/24 1154   Present on Original Admission: Y   Primary Wound Type: Venous ulcer   Side: Left   Orientation: lower;medial   Location: Leg   Wound Approximate Age at First Assessment (Weeks):    Wound Number: #2   Is this injury device related?:    Incision Type:    Closure Method:    Wound Description (Comments):    Type:    Additional Comments:    Ankle-Brachial Index:    Pulses:    Removal Indication and Assessment:    Wound Outcome:    Dressing Appearance Moist drainage 10/01/24 0747   Drainage Amount Moderate 10/01/24 0747   Drainage Characteristics/Odor Serosanguineous 10/01/24 0747   Appearance Red;Moist 10/01/24 0747   Tissue loss description Full thickness 10/01/24 0747   Periwound Area Dry;Blistered 10/01/24 0747   Wound Edges Open 10/01/24 0747    Wound Length (cm) 0.7 cm 10/01/24 0747   Wound Width (cm) 1.1 cm 10/01/24 0747   Wound Depth (cm) 0.2 cm 10/01/24 0747   Wound Volume (cm^3) 0.154 cm^3 10/01/24 0747   Wound Surface Area (cm^2) 0.77 cm^2 10/01/24 0747   Care Cleansed with:;Wound cleanser 10/01/24 0747   Dressing Applied 10/01/24 0747   Compression Unna's Boot 10/01/24 0747   Dressing Change Due 10/04/24 10/01/24 0747     10/01/24    Left medial lower leg   (Silver alginate, unna boot, kerlix, coban)   ML      Assessment:           ICD-10-CM ICD-9-CM   1. Venous stasis ulcer of left calf limited to breakdown of skin without varicose veins  I87.2 459.81    L97.221 707.12   2. Chronic venous insufficiency  I87.2 459.81           Procedures:     Nurse Visit ONLY    [] Yes [] No   I & D performed  [] Yes [] No   Excisional debridement performed  [] Yes [] No   Selective debridement performed           [] Yes [] No   Mechanical debridement performed         [] Yes [] No  Silver nitrate applied                                     [] Yes [] No  Labs ordered this visit                                  [] Yes [] No   Imaging ordered this visit                           [] Yes [] No   Tissue pathology and/or culture taken         MEDICATIONS    Current Outpatient Medications:     LASIX 40 mg tablet, Take 40 mg by mouth., Disp: , Rfl:     metoprolol succinate (TOPROL-XL) 25 MG 24 hr tablet, Take 50 mg by mouth 2 (two) times daily., Disp: , Rfl:     potassium chloride SA (K-DUR,KLOR-CON) 20 MEQ tablet, Take 20 mEq by mouth., Disp: , Rfl:     rosuvastatin (CRESTOR) 20 MG tablet, Take 20 mg by mouth every evening., Disp: , Rfl:     tamsulosin (FLOMAX) 0.4 mg Cap, Take 0.4 mg by mouth., Disp: , Rfl:     XARELTO 20 mg Tab, Take 20 mg by mouth., Disp: , Rfl:     mupirocin (BACTROBAN) 2 % ointment, , Disp: , Rfl:     SSD 1 % cream, , Disp: , Rfl:  Review of patient's allergies indicates:  No Known Allergies    DIAGNOSTICS    Labs/Scans/Micro:    CBC:   Lab Results  "  Component Value Date    WBC 3.88 (L) 09/11/2023    HGB 12.8 (L) 09/11/2023    HCT 39.7 (L) 09/11/2023    MCV 90.8 09/11/2023     09/11/2023     Sedimentation rate: No results found for: "SEDRATE"     C-reactive protein: No results found for: "CRP"     Chemistry: 3/12/24  Comprehensive Metabolic Panel       Component Ref Range & Units 4 mo ago  (3/12/24)   Sodium 136 - 145 mmol/L 136   Potassium 3.5 - 5.1 mmol/L 4.2   Chloride 98 - 107 mmol/L 100   CO2 23 - 31 mmol/L 27   Glucose 82 - 115 mg/dL 105   Blood Urea Nitrogen 8.4 - 25.7 mg/dL 21.0   Creatinine 0.73 - 1.18 mg/dL 0.82   Calcium 8.8 - 10.0 mg/dL 9.6   Protein Total 5.8 - 7.6 gm/dL 7.4   Albumin 3.4 - 4.8 g/dL 3.8   Globulin 2.4 - 3.5 gm/dL 3.6 High    Albumin/Globulin Ratio 1.1 - 2.0 ratio 1.1   Bilirubin Total <=1.5 mg/dL 0.5   ALP 40 - 150 unit/L 127   ALT 0 - 55 unit/L 13   AST 5 - 34 unit/L 28   eGFR mls/min/1.73/m2 >60   Resulting Agency  [facility]        HBA1C: No components found for: "HBA1C"    Ankle Brachial Index: 7/11/24 -   Right - 1.34  Left - 1.29    Imaging:    Plain film: No results found for this or any previous visit.    MRI: No results found for this or any previous visit.    Bone Scan: No results found for this or any previous visit.    Vascular studies:    7/11/24 -   Arterial:  FINDINGS:  Ultrasound Doppler and color flow imaging were performed on the arteries of the left lower extremity.  A tri phasic flow wave pattern is evident throughout the visualized arteries of the left lower extremity no significant localized atherosclerotic plaque formation or focal stenosis noted to the visualized arteries of the left lower extremity.  The right ankle brachial index is 1.34 and the left is 1.29.  Impression:  1.  No significant localized atherosclerotic plaque formation or focal stenosis noted to the visualized arteries of theleft lower extremity.    Venous:  FINDINGS:  There is normal compression and flow noted in the  common femoral " vein, superficial femoral vein, popliteal vein, and  posterior tibial veinbilaterally.  No evidence of deep venous thrombosis is identified. No significant venous reflux is identified.  The left greater saphenous vein has been surgically harvested.  Impression:  1. No deep venous thrombosis identified to the lower extremities bilaterally.    Microbiology:   7/24/24 - Staph Aureus - Doxy Rx sent        HOME HEALTH AGENCY:  none  WOUND CARE ORDERS:  Left medial lower leg wound: Cleanse with wound cleanser, apply silver alginate, zic unna boot, kerlix, and coban to be changed on Tuesday and Fridays   Right anterior lower leg wound: Keep clean and dry, wear compression socks daily      Follow up in about 3 days (around 10/4/2024) for Tanisha Benitez , Provider Visit.

## 2024-10-04 ENCOUNTER — HOSPITAL ENCOUNTER (OUTPATIENT)
Dept: WOUND CARE | Facility: HOSPITAL | Age: 84
Discharge: HOME | End: 2024-10-04
Attending: NURSE PRACTITIONER
Payer: MEDICARE

## 2024-10-04 VITALS
HEIGHT: 76 IN | SYSTOLIC BLOOD PRESSURE: 128 MMHG | HEART RATE: 101 BPM | DIASTOLIC BLOOD PRESSURE: 75 MMHG | RESPIRATION RATE: 18 BRPM | WEIGHT: 274.94 LBS | BODY MASS INDEX: 33.48 KG/M2

## 2024-10-04 DIAGNOSIS — L97.221 VENOUS STASIS ULCER OF LEFT CALF LIMITED TO BREAKDOWN OF SKIN WITHOUT VARICOSE VEINS: Primary | ICD-10-CM

## 2024-10-04 DIAGNOSIS — I87.2 VENOUS STASIS ULCER OF LEFT CALF LIMITED TO BREAKDOWN OF SKIN WITHOUT VARICOSE VEINS: Primary | ICD-10-CM

## 2024-10-04 DIAGNOSIS — I87.2 CHRONIC VENOUS INSUFFICIENCY: ICD-10-CM

## 2024-10-04 PROCEDURE — 29580 STRAPPING UNNA BOOT: CPT

## 2024-10-04 PROCEDURE — 99213 OFFICE O/P EST LOW 20 MIN: CPT | Mod: 25

## 2024-10-04 PROCEDURE — 27000999 HC MEDICAL RECORD PHOTO DOCUMENTATION

## 2024-10-04 NOTE — PROGRESS NOTES
[facility]: None  Smoker   [] Yes   [x] No   Diabetic  [] Yes  [x] No   Vascular Surgeon: Dr. Carter  Vascular procedures [] Yes [x] No   If so, when and what was done?  Recent Ultrasounds [x] Yes [] No   If so, when were they done? (7/29/24 - venous/arterial)  Compression Pumps  [] Yes [x] No       Right ankle - 26.5 cm     Right calf - 43.5 cm     Left ankle - 27 cm     Left calf - 44 cm  Doppler done in office? [x] Yes [] No   Date: 7/10/24     Right dorsalis: monophasic     Right posterior: biphasic     Left dorsalis: biphasic     Left posterior: abnormal  Is the patient eligible for a graft, and/or currently grafting? [] Yes [x] No    If so, which one/size?    Subjective:       Patient ID: Piter Farrell is a 84 y.o. male.    Chief Complaint: Venous Ulcer (Left medial lower leg)    HPI    September 27, 2024:  84-year-old white male, is here for follow up of the left venous stasis ulcer, chronic venous insufficiency.  He has been using a Tubigrip on the right leg, and Unna boot on the left leg.  The venous stasis ulcer on the left calf is measuring the same as last week, but it is very clean.    September 20, 2024:  84-year-old white male, who is here for follow up of the left calf venous stasis ulcer.  It is measuring smaller today.  He has been using an Unna boot.  The maceration around the edges is much improved.    September 13, 2024:  84-year-old white male, who is here for follow up of the left calf venous stasis ulcer.  The measurements are improving, but there still maceration around the edges.  He has been using Polymem.  The edema has greatly improved.  He is also using an Unna boot on the left leg at this time.    9/10/24 - Mr. Farrell is seen today for a nurse visit only for an Unna Boot change for the left venous stasis ulcer.  No issues or concerns were brought to this providers attention during this visit.     September 6, 2024:  84-year-old white male, who is here for follow up of his left venous  stasis ulcer, chronic venous insufficiency.  The venous stasis ulcer is improving, with regard to measurements.  He has been using Polymem.  There still some maceration around the edges.  He is using boot on the left leg twice a week.    9/3/24 - The patient is seen today for a nurse visit only for an Unna Boot change for the left venous stasis ulcer.  No issues or concerns were brought to this providers attention during this visit.     7/10/24 - Mr. Farrell is an 84 year old  male who presents with bilateral lower extremity venous ulcers.  His PCP is Dr. Jesus Hernandez, who referred him to wound clinic.    4/16/24: saw PCP for multiple sores on lower extremities, was given Augmentin 875 mg BID and Bumetanide (no culture was obtained).  4/25/24: saw PCP again for bacterial infection of lower extremities, refilled Augmentin 875 mg BID for 5 days (no culture was obtained)  5/2/24: saw PCP for left leg, was given Silvadine  5/28/24: saw PCP, suggested that he use compression stockings and follow up with his cardiologist (Dr. Carter), he did not do either of these things  7/9/24: saw PCP given Lasix, Cefzil and referred to wound care  He has a history of triple bypass and CHF.   He states that he had venous US done on his legs about 1 month ago with Dr. Carter.  These results were reviewed.  No arterial U/S were performed and the study shows that it was incomplete due to patient resistance.   He is not a diabetic.   He states that he was seen in our clinic about 3 years ago for the same issue.    Today his bilateral lower extremities have multiple open areas that do appear venous in nature.  Because his venous ultrasounds were in conclusive an incomplete we will be ordering full cardiovascular ultrasounds.  He does have 2+ edema as well as hemosiderin staining.  We would like to compress, however, until we have arterial clearing we are hesitant to do so as this has not been addressed.  We did have a lengthy  discussion today regarding his congestive heart failure, the need to compress, the impact that compression may have on his CHF due to moving the fluid from his legs to his chest, as well as the need to verify his arterial status prior to compressing.  He is in agreement with all of this and today we will begin covering the open ulcers and applying a Tubigrip over the Kerlix to hold the dressings in place.  He was instructed that he should alternate short elevation of the legs with dependent hanging of the legs so that blood flow can continue to the feet but at the same time tried to begin offloading some of the legs.  He is ambulatory and therefore would be able to wear an Unna boot wound we are at that point.  He does been the majority of his time at a local skilled nursing facility with his handicapped son.   All 10 toe nails today were quite lengthy and were trimmed for the patient.    7/17/24 - Mr. Farrell is here today for followup on his bilateral lower extremity venous ulcers as well as a small wound to the right 2nd toe.  The right 2nd toe was assessed and remains stable.  The scattered wounds at the lower extremities were selectively debrided.  They are beginning to make progress and there are couple of areas were epithelial tissue is beginning to my great from the edges.  He has completed his p.o. Cefzil.  We are anxious to begin compressing the lower extremities, however, we do not have arterial clearing at this time.  He is scheduled for cardiovascular ultrasounds on 07/29/2024 at 10:00 a.m..  We do have venous ultrasounds on file, however, we do not have arterial.  Once arterial ultrasounds or clear we will begin compressing using Unna boots.    7/25/24 - The patient returns today for f/up on his bilateral lower extremity venous ulcers.  The wounds have all improved since his last visit on 7/17/24, however there is some slight erythema at the left lower extremity.  A culture was obtained of the left leg.   He is scheduled for bilateral venous/arterial US on 7/29/24. However, he is concerned about using compression and wound like to discuss it with his cardiologist, Dr. Carter.   Once the U/S are done, if there is any venous reflux noted these will be sent to Dr. Carter for evaluation and consultation with the patient prior to compression.     7/31/24 - Mr. Farrell returns today for the bilateral lower extremity venous ulcers.  He did complete his cardiovascular U/S on 7/29/24.  Both his arterial and venous studies were normal.  He also had a wound culture done on 7/25/24 which shows 100% Staph aureus.  He was prescribed Doxycycline today x 10 days.  We will also be authorizing Dalvance infusion for him in treatment of Staph aureus.  We would like to begin compression with Unna Boots, however, the patient would first like to clear that with his cardiologist.  He does have an adequate EF.  He does have an appt with Dr. Carter (cardio) this afternoon at 2:30 to have a halter monitor placed due to his a-fib. He was given a copy of his vascular studies to review at that time and will discus compression. Today the wounds on the left leg were selectively debrided.  Santyl has been ordered and we discussed the cost.  If he is unable to obtain he will continue with Mesalt.     August 7, 2024:    84-year-old white male, she of edema in his legs, and some venous stasis ulcers.  The recent venous ultrasound showed good flow in all of his major veins.  There was no DVT.  His CHF appears to be well controlled.  He will an attempt at compression of both legs.  The venous stasis ulcers in his legs are somewhat stalled at this time.  There is a left lower medial venous stasis ulcer, with slough present.  It is slightly painful today.    August 9, 2024:  84-year-old white male, who is here for a nurse visit only, to have his dressings changed.  There are no new issues.    August 13, 2024:  84-year-old white male, who is here for follow  up of his chronic venous insufficiency, left lower leg venous stasis ulcer.  These wounds are all improving.  The wound on his right lower leg has just about healed.  The edema is much improved with Unna boots.  He does have compression stockings at home, for future needs.    August 16, 2024:  84-year-old white male, who is here for a nurse visit only, to have his Unna boot changed.  There are no new issues.  He has venous stasis ulcers.    8/21/24 - Patient is seen today for a nurse visit only for Unna boot change.  No issues or complaints were brought to this provider's attention during this visit.     August 23, 2024:  84-year-old white male, who is here for follow up of his chronic venous insufficiency, left calf venous stasis ulcer.  He has no open wounds on the right leg.  He will no longer need an Unna boot on the right leg.  The wound on the left calf is improving each week.    8/27/24 - Mr. Farrell is seen today for a nurse visit only for an Unna Boot change for the left venous stasis ulcer.  No issues or concerns were brought to this providers attention during this visit.     8/30/24 - The patient followup on the venous stasis ulcers at his left lower extremity.  Today, that wound was quite macerated.  He does attempt to elevate as much as possible, however, his son is in the nursing facility and so he does been a lot of time there with the legs in a dependent position.  Today, we will change from silver alginate to Polymem with the hopes that that product may hold the moisture a little better.  He will continue with the Unna boots and twice weekly visits as well.  Of note, compression stockings were ordered on Tuesday 8/27/24, he has not received them yet, we will check on this order. He has blood work and a follow up with Dr. Carter (cardio) in Novemeber 9/17/24 - Mr. Farrell is seen today for a nurse visit only for an Unna Boot change for the left venous stasis ulcer.  No issues or concerns were brought  to this providers attention during this visit.     9/24/24 - Mr. Farrell is seen today for a nurse visit only for an Unna Boot change for the left venous stasis ulcer.  No issues or concerns were brought to this providers attention during this visit.     10/1/24 - The patient was seen today for a nurse visit only for an Unna Boot change for the left venous stasis ulcer.  No issues or concerns were brought to this providers attention during this visit.     10/4/24 - Mr. Farrell returns today for followup on venous ulcers at his left lower extremity.  The initial wound is considerably smaller today, however, since his last visit has had the development a 2nd larger blister.  A mechanical debridement was performed and we will re-apply the Unna boot today.    Review of Systems   Constitutional: Negative.    Respiratory: Negative.     Cardiovascular: Negative.    Skin:         As documented in the HPI.   All other systems reviewed and are negative.        Objective:        Vitals:    10/04/24 0839   BP: 128/75   Pulse: 101   Resp: 18     Physical Exam  Vitals and nursing note reviewed.   Constitutional:       Appearance: Normal appearance.   Cardiovascular:      Rate and Rhythm: Normal rate.   Pulmonary:      Effort: Pulmonary effort is normal.   Skin:     General: Skin is warm and dry.      Comments: venous stasis ulcer on the left calf   Neurological:      Mental Status: He is alert.            Wound 07/10/24 1154 Venous Ulcer Left lower;medial Leg #2 (Active)   07/10/24 1154   Present on Original Admission: Y   Primary Wound Type: Venous ulcer   Side: Left   Orientation: lower;medial   Location: Leg   Wound Approximate Age at First Assessment (Weeks):    Wound Number: #2   Is this injury device related?:    Incision Type:    Closure Method:    Wound Description (Comments):    Type:    Additional Comments:    Ankle-Brachial Index:    Pulses:    Removal Indication and Assessment:    Wound Outcome:    Dressing Appearance  Moist drainage 10/04/24 0840   Drainage Amount Moderate 10/04/24 0840   Drainage Characteristics/Odor Serosanguineous 10/04/24 0840   Appearance Moist;Red;Granulating 10/04/24 0840   Tissue loss description Full thickness 10/04/24 0840   Periwound Area Intact 10/04/24 0840   Wound Edges Open 10/04/24 0840   Wound Length (cm) 0.5 cm 10/04/24 0840   Wound Width (cm) 0.6 cm 10/04/24 0840   Wound Depth (cm) 0.2 cm 10/04/24 0840   Wound Volume (cm^3) 0.06 cm^3 10/04/24 0840   Wound Surface Area (cm^2) 0.3 cm^2 10/04/24 0840   Care Cleansed with:;Wound cleanser 10/04/24 0840   Dressing Applied;Other (comment) 10/04/24 0840   Dressing Change Due 10/07/24 10/04/24 0840     10/4/24      Left lower leg       Post mechanical debridement  silver alginate, zinc unna boot, coban   CT      Assessment:         ICD-10-CM ICD-9-CM   1. Venous stasis ulcer of left calf limited to breakdown of skin without varicose veins  I87.2 459.81    L97.221 707.12   2. Chronic venous insufficiency  I87.2 459.81         Procedures:     Mechanical debridement only    [] Yes [] No   I & D performed  [] Yes [] No   Excisional debridement performed  [] Yes [] No   Selective debridement performed           [x] Yes [] No   Mechanical debridement performed         [] Yes [] No  Silver nitrate applied                                     [] Yes [] No  Labs ordered this visit                                  [] Yes [] No   Imaging ordered this visit                           [] Yes [] No   Tissue pathology and/or culture taken         MEDICATIONS    Current Outpatient Medications:     LASIX 40 mg tablet, Take 40 mg by mouth., Disp: , Rfl:     metoprolol succinate (TOPROL-XL) 25 MG 24 hr tablet, Take 50 mg by mouth 2 (two) times daily., Disp: , Rfl:     mupirocin (BACTROBAN) 2 % ointment, , Disp: , Rfl:     potassium chloride SA (K-DUR,KLOR-CON) 20 MEQ tablet, Take 20 mEq by mouth., Disp: , Rfl:     rosuvastatin (CRESTOR) 20 MG tablet, Take 20 mg by mouth  "every evening., Disp: , Rfl:     SSD 1 % cream, , Disp: , Rfl:     tamsulosin (FLOMAX) 0.4 mg Cap, Take 0.4 mg by mouth., Disp: , Rfl:     XARELTO 20 mg Tab, Take 20 mg by mouth., Disp: , Rfl:  Review of patient's allergies indicates:  No Known Allergies    DIAGNOSTICS    Labs/Scans/Micro:    CBC:   Lab Results   Component Value Date    WBC 3.88 (L) 09/11/2023    HGB 12.8 (L) 09/11/2023    HCT 39.7 (L) 09/11/2023    MCV 90.8 09/11/2023     09/11/2023     Sedimentation rate: No results found for: "SEDRATE"     C-reactive protein: No results found for: "CRP"     Chemistry: 3/12/24  Comprehensive Metabolic Panel       Component Ref Range & Units 4 mo ago  (3/12/24)   Sodium 136 - 145 mmol/L 136   Potassium 3.5 - 5.1 mmol/L 4.2   Chloride 98 - 107 mmol/L 100   CO2 23 - 31 mmol/L 27   Glucose 82 - 115 mg/dL 105   Blood Urea Nitrogen 8.4 - 25.7 mg/dL 21.0   Creatinine 0.73 - 1.18 mg/dL 0.82   Calcium 8.8 - 10.0 mg/dL 9.6   Protein Total 5.8 - 7.6 gm/dL 7.4   Albumin 3.4 - 4.8 g/dL 3.8   Globulin 2.4 - 3.5 gm/dL 3.6 High    Albumin/Globulin Ratio 1.1 - 2.0 ratio 1.1   Bilirubin Total <=1.5 mg/dL 0.5   ALP 40 - 150 unit/L 127   ALT 0 - 55 unit/L 13   AST 5 - 34 unit/L 28   eGFR mls/min/1.73/m2 >60   Resulting Agency  [facility]        HBA1C: No components found for: "HBA1C"    Ankle Brachial Index: 7/11/24 -   Right - 1.34  Left - 1.29    Imaging:    Plain film: No results found for this or any previous visit.    MRI: No results found for this or any previous visit.    Bone Scan: No results found for this or any previous visit.    Vascular studies:    7/11/24 -   Arterial:  FINDINGS:  Ultrasound Doppler and color flow imaging were performed on the arteries of the left lower extremity.  A tri phasic flow wave pattern is evident throughout the visualized arteries of the left lower extremity no significant localized atherosclerotic plaque formation or focal stenosis noted to the visualized arteries of the left lower " extremity.  The right ankle brachial index is 1.34 and the left is 1.29.  Impression:  1.  No significant localized atherosclerotic plaque formation or focal stenosis noted to the visualized arteries of theleft lower extremity.    Venous:  FINDINGS:  There is normal compression and flow noted in the  common femoral vein, superficial femoral vein, popliteal vein, and  posterior tibial veinbilaterally.  No evidence of deep venous thrombosis is identified. No significant venous reflux is identified.  The left greater saphenous vein has been surgically harvested.  Impression:  1. No deep venous thrombosis identified to the lower extremities bilaterally.    Microbiology:   7/24/24 - Staph Aureus - Doxy Rx sent        HOME HEALTH AGENCY:  none  WOUND CARE ORDERS:  Left medial lower leg wound: Cleanse with wound cleanser, apply silver alginate, zic unna boot, kerlix, and coban to be changed on Tuesday and Fridays   Right anterior lower leg wound: Keep clean and dry, wear compression socks daily      Follow up in 3 days (on 10/7/2024) for Nurse visit .

## 2024-10-07 ENCOUNTER — HOSPITAL ENCOUNTER (OUTPATIENT)
Dept: WOUND CARE | Facility: HOSPITAL | Age: 84
Discharge: HOME | End: 2024-10-07
Attending: NURSE PRACTITIONER
Payer: MEDICARE

## 2024-10-07 VITALS
RESPIRATION RATE: 18 BRPM | BODY MASS INDEX: 33.48 KG/M2 | SYSTOLIC BLOOD PRESSURE: 130 MMHG | WEIGHT: 274.94 LBS | HEART RATE: 101 BPM | DIASTOLIC BLOOD PRESSURE: 82 MMHG | HEIGHT: 76 IN

## 2024-10-07 DIAGNOSIS — I87.2 VENOUS STASIS ULCER OF LEFT CALF LIMITED TO BREAKDOWN OF SKIN WITHOUT VARICOSE VEINS: ICD-10-CM

## 2024-10-07 DIAGNOSIS — L97.221 VENOUS STASIS ULCER OF LEFT CALF LIMITED TO BREAKDOWN OF SKIN WITHOUT VARICOSE VEINS: ICD-10-CM

## 2024-10-07 DIAGNOSIS — I87.2 CHRONIC VENOUS INSUFFICIENCY: Primary | ICD-10-CM

## 2024-10-07 PROCEDURE — 27000999 HC MEDICAL RECORD PHOTO DOCUMENTATION

## 2024-10-07 PROCEDURE — 29580 STRAPPING UNNA BOOT: CPT

## 2024-10-07 PROCEDURE — 99211 OFF/OP EST MAY X REQ PHY/QHP: CPT | Mod: 25

## 2024-10-07 NOTE — PROGRESS NOTES
[facility]: None  Smoker   [] Yes   [x] No   Diabetic  [] Yes  [x] No   Vascular Surgeon: Dr. Carter  Vascular procedures [] Yes [x] No   If so, when and what was done?  Recent Ultrasounds [x] Yes [] No   If so, when were they done? (7/29/24 - venous/arterial)  Compression Pumps  [] Yes [x] No       Right ankle - 26 cm     Right calf - 44.5 cm     Left ankle - 2 cm     Left calf - 42 cm  Doppler done in office? [x] Yes [] No   Date: 7/10/24     Right dorsalis: monophasic     Right posterior: biphasic     Left dorsalis: biphasic     Left posterior: abnormal  Is the patient eligible for a graft, and/or currently grafting? [] Yes [x] No    If so, which one/size?    NOTES:    Subjective:       Patient ID: Piter Farrell is a 84 y.o. male.    Chief Complaint: Venous Ulcer ( (Left medial lower leg))    HPI      10/7/24 - Patient here for nurse visit - new blister has developed near original two wounds. Resumed use of original zinc unna boot.    10/4/24 - Mr. Farrell returns today for followup on venous ulcers at his left lower extremity.  The initial wound is considerably smaller today, however, since his last visit has had the development a 2nd larger blister.  A mechanical debridement was performed and we will re-apply the Unna boot today.    10/1/24 - The patient was seen today for a nurse visit only for an Unna Boot change for the left venous stasis ulcer.  No issues or concerns were brought to this providers attention during this visit.     September 27, 2024:  84-year-old white male, is here for follow up of the left venous stasis ulcer, chronic venous insufficiency.  He has been using a Tubigrip on the right leg, and Unna boot on the left leg.  The venous stasis ulcer on the left calf is measuring the same as last week, but it is very clean.    9/24/24 - Mr. Farrell is seen today for a nurse visit only for an Unna Boot change for the left venous stasis ulcer.  No issues or concerns were brought to this providers  attention during this visit    September 20, 2024:  84-year-old white male, who is here for follow up of the left calf venous stasis ulcer.  It is measuring smaller today.  He has been using an Unna boot.  The maceration around the edges is much improved.    9/17/24 - Mr. Farrell is seen today for a nurse visit only for an Unna Boot change for the left venous stasis ulcer.  No issues or concerns were brought to this providers attention during this visit.     September 13, 2024:  84-year-old white male, who is here for follow up of the left calf venous stasis ulcer.  The measurements are improving, but there still maceration around the edges.  He has been using Polymem.  The edema has greatly improved.  He is also using an Unna boot on the left leg at this time.    9/10/24 - Mr. Farrell is seen today for a nurse visit only for an Unna Boot change for the left venous stasis ulcer.  No issues or concerns were brought to this providers attention during this visit.     September 6, 2024:  84-year-old white male, who is here for follow up of his left venous stasis ulcer, chronic venous insufficiency.  The venous stasis ulcer is improving, with regard to measurements.  He has been using Polymem.  There still some maceration around the edges.  He is using boot on the left leg twice a week.    9/3/24 - The patient is seen today for a nurse visit only for an Unna Boot change for the left venous stasis ulcer.  No issues or concerns were brought to this providers attention during this visit.     8/30/24 - The patient followup on the venous stasis ulcers at his left lower extremity.  Today, that wound was quite macerated.  He does attempt to elevate as much as possible, however, his son is in the nursing facility and so he does been a lot of time there with the legs in a dependent position.  Today, we will change from silver alginate to Polymem with the hopes that that product may hold the moisture a little better.  He will continue  with the Unna boots and twice weekly visits as well.  Of note, compression stockings were ordered on Tuesday 8/27/24, he has not received them yet, we will check on this order. He has blood work and a follow up with Dr. Carter (cardio) in [facility]    8/27/24 - Mr. Farrell is seen today for a nurse visit only for an Unna Boot change for the left venous stasis ulcer.  No issues or concerns were brought to this providers attention during this visit.     August 23, 2024:  84-year-old white male, who is here for follow up of his chronic venous insufficiency, left calf venous stasis ulcer.  He has no open wounds on the right leg.  He will no longer need an Unna boot on the right leg.  The wound on the left calf is improving each week.    8/21/24 - Patient is seen today for a nurse visit only for Unna boot change.  No issues or complaints were brought to this provider's attention during this visit.     August 16, 2024:  84-year-old white male, who is here for a nurse visit only, to have his Unna boot changed.  There are no new issues.  He has venous stasis ulcers.    August 13, 2024:  84-year-old white male, who is here for follow up of his chronic venous insufficiency, left lower leg venous stasis ulcer.  These wounds are all improving.  The wound on his right lower leg has just about healed.  The edema is much improved with Unna boots.  He does have compression stockings at home, for future needs.      August 9, 2024:  84-year-old white male, who is here for a nurse visit only, to have his dressings changed.  There are no new issues.    August 7, 2024:    84-year-old white male, she of edema in his legs, and some venous stasis ulcers.  The recent venous ultrasound showed good flow in all of his major veins.  There was no DVT.  His CHF appears to be well controlled.  He will an attempt at compression of both legs.  The venous stasis ulcers in his legs are somewhat stalled at this time.  There is a left lower medial venous  stasis ulcer, with slough present.  It is slightly painful today.    7/31/24 - Mr. Farrell returns today for the bilateral lower extremity venous ulcers.  He did complete his cardiovascular U/S on 7/29/24.  Both his arterial and venous studies were normal.  He also had a wound culture done on 7/25/24 which shows 100% Staph aureus.  He was prescribed Doxycycline today x 10 days.  We will also be authorizing Dalvance infusion for him in treatment of Staph aureus.  We would like to begin compression with Unna Boots, however, the patient would first like to clear that with his cardiologist.  He does have an adequate EF.  He does have an appt with Dr. Carter (cardio) this afternoon at 2:30 to have a halter monitor placed due to his a-fib. He was given a copy of his vascular studies to review at that time and will discus compression. Today the wounds on the left leg were selectively debrided.  Santyl has been ordered and we discussed the cost.  If he is unable to obtain he will continue with Mesalt.     7/25/24 - The patient returns today for f/up on his bilateral lower extremity venous ulcers.  The wounds have all improved since his last visit on 7/17/24, however there is some slight erythema at the left lower extremity.  A culture was obtained of the left leg.  He is scheduled for bilateral venous/arterial US on 7/29/24. However, he is concerned about using compression and wound like to discuss it with his cardiologist, Dr. Carter.   Once the U/S are done, if there is any venous reflux noted these will be sent to Dr. Carter for evaluation and consultation with the patient prior to compression.     7/17/24 - Mr. Farrell is here today for followup on his bilateral lower extremity venous ulcers as well as a small wound to the right 2nd toe.  The right 2nd toe was assessed and remains stable.  The scattered wounds at the lower extremities were selectively debrided.  They are beginning to make progress and there are couple of  areas were epithelial tissue is beginning to my great from the edges.  He has completed his p.o. Cefzil.  We are anxious to begin compressing the lower extremities, however, we do not have arterial clearing at this time.  He is scheduled for cardiovascular ultrasounds on 07/29/2024 at 10:00 a.m..  We do have venous ultrasounds on file, however, we do not have arterial.  Once arterial ultrasounds or clear we will begin compressing using Unna boots.    7/10/24 - Mr. Farrell is an 84 year old  male who presents with bilateral lower extremity venous ulcers.  His PCP is Dr. Jesus Hernandez, who referred him to wound clinic.    4/16/24: saw PCP for multiple sores on lower extremities, was given Augmentin 875 mg BID and Bumetanide (no culture was obtained).  4/25/24: saw PCP again for bacterial infection of lower extremities, refilled Augmentin 875 mg BID for 5 days (no culture was obtained)  5/2/24: saw PCP for left leg, was given Silvadine  5/28/24: saw PCP, suggested that he use compression stockings and follow up with his cardiologist (Dr. Carter), he did not do either of these things  7/9/24: saw PCP given Lasix, Cefzil and referred to wound care  He has a history of triple bypass and CHF.   He states that he had venous US done on his legs about 1 month ago with Dr. Carter.  These results were reviewed.  No arterial U/S were performed and the study shows that it was incomplete due to patient resistance.   He is not a diabetic.   He states that he was seen in our clinic about 3 years ago for the same issue.    Today his bilateral lower extremities have multiple open areas that do appear venous in nature.  Because his venous ultrasounds were in conclusive an incomplete we will be ordering full cardiovascular ultrasounds.  He does have 2+ edema as well as hemosiderin staining.  We would like to compress, however, until we have arterial clearing we are hesitant to do so as this has not been addressed.  We did have a  lengthy discussion today regarding his congestive heart failure, the need to compress, the impact that compression may have on his CHF due to moving the fluid from his legs to his chest, as well as the need to verify his arterial status prior to compressing.  He is in agreement with all of this and today we will begin covering the open ulcers and applying a Tubigrip over the Kerlix to hold the dressings in place.  He was instructed that he should alternate short elevation of the legs with dependent hanging of the legs so that blood flow can continue to the feet but at the same time tried to begin offloading some of the legs.  He is ambulatory and therefore would be able to wear an Unna boot wound we are at that point.  He does been the majority of his time at a local skilled nursing facility with his handicapped son.   All 10 toe nails today were quite lengthy and were trimmed for the patient.    Review of Systems   Constitutional: Negative.    Respiratory: Negative.     Cardiovascular: Negative.    Skin:         As documented in the HPI.   All other systems reviewed and are negative.        Objective:        Vitals:    10/07/24 0801   BP: 130/82   Pulse: 101   Resp: 18       Physical Exam  Vitals and nursing note reviewed.   Constitutional:       Appearance: Normal appearance.   Cardiovascular:      Rate and Rhythm: Normal rate.   Pulmonary:      Effort: Pulmonary effort is normal.   Skin:     General: Skin is warm and dry.      Comments: venous stasis ulcer on the left calf   Neurological:      Mental Status: He is alert.            Wound 07/10/24 1154 Venous Ulcer Left lower;medial Leg #2 (Active)   07/10/24 1154   Present on Original Admission: Y   Primary Wound Type: Venous ulcer   Side: Left   Orientation: lower;medial   Location: Leg   Wound Approximate Age at First Assessment (Weeks):    Wound Number: #2   Is this injury device related?:    Incision Type:    Closure Method:    Wound Description (Comments):     Type:    Additional Comments:    Ankle-Brachial Index:    Pulses:    Removal Indication and Assessment:    Wound Outcome:    Dressing Appearance Moist drainage 10/07/24 0802   Drainage Amount Moderate 10/07/24 0802   Drainage Characteristics/Odor Serosanguineous 10/07/24 0802   Appearance Red;Moist 10/07/24 0802   Tissue loss description Full thickness 10/07/24 0802   Periwound Area Blistered;Edematous 10/07/24 0802   Wound Edges Open 10/07/24 0802   Wound Length (cm) 4.5 cm 10/07/24 0802   Wound Width (cm) 4.5 cm 10/07/24 0802   Wound Depth (cm) 0.2 cm 10/07/24 0802   Wound Volume (cm^3) 4.05 cm^3 10/07/24 0802   Wound Surface Area (cm^2) 20.25 cm^2 10/07/24 0802   Care Cleansed with:;Wound cleanser 10/07/24 0802   Dressing Applied 10/07/24 0802   Dressing Change Due 10/11/24 10/07/24 0802     10/4/24      Left lower leg       Post mechanical debridement  silver alginate, zinc unna boot, coban   CT    10/7/24    Left lower medial leg (pre)  (Zinc unna boot, kerlix, coban)      Assessment:         ICD-10-CM ICD-9-CM   1. Chronic venous insufficiency  I87.2 459.81   2. Venous stasis ulcer of left calf limited to breakdown of skin without varicose veins  I87.2 459.81    L97.221 707.12           Procedures:     Mechanical debridement only    [] Yes [x] No   I & D performed  [] Yes [x] No   Excisional debridement performed  [] Yes [x] No   Selective debridement performed           [x] Yes [] No   Mechanical debridement performed         [] Yes [x] No  Silver nitrate applied                                     [] Yes [x] No  Labs ordered this visit                                  [] Yes [x] No   Imaging ordered this visit                           [] Yes [x] No   Tissue pathology and/or culture taken         MEDICATIONS    Current Outpatient Medications:     LASIX 40 mg tablet, Take 40 mg by mouth., Disp: , Rfl:     metoprolol succinate (TOPROL-XL) 25 MG 24 hr tablet, Take 50 mg by mouth 2 (two) times daily., Disp: ,  "Rfl:     potassium chloride SA (K-DUR,KLOR-CON) 20 MEQ tablet, Take 20 mEq by mouth., Disp: , Rfl:     rosuvastatin (CRESTOR) 20 MG tablet, Take 20 mg by mouth every evening., Disp: , Rfl:     tamsulosin (FLOMAX) 0.4 mg Cap, Take 0.4 mg by mouth., Disp: , Rfl:     XARELTO 20 mg Tab, Take 20 mg by mouth., Disp: , Rfl:     mupirocin (BACTROBAN) 2 % ointment, , Disp: , Rfl:     SSD 1 % cream, , Disp: , Rfl:  Review of patient's allergies indicates:  No Known Allergies    DIAGNOSTICS    Labs/Scans/Micro:    CBC:   Lab Results   Component Value Date    WBC 3.88 (L) 09/11/2023    HGB 12.8 (L) 09/11/2023    HCT 39.7 (L) 09/11/2023    MCV 90.8 09/11/2023     09/11/2023     Sedimentation rate: No results found for: "SEDRATE"     C-reactive protein: No results found for: "CRP"     Chemistry: 3/12/24  Comprehensive Metabolic Panel       Component Ref Range & Units 4 mo ago  (3/12/24)   Sodium 136 - 145 mmol/L 136   Potassium 3.5 - 5.1 mmol/L 4.2   Chloride 98 - 107 mmol/L 100   CO2 23 - 31 mmol/L 27   Glucose 82 - 115 mg/dL 105   Blood Urea Nitrogen 8.4 - 25.7 mg/dL 21.0   Creatinine 0.73 - 1.18 mg/dL 0.82   Calcium 8.8 - 10.0 mg/dL 9.6   Protein Total 5.8 - 7.6 gm/dL 7.4   Albumin 3.4 - 4.8 g/dL 3.8   Globulin 2.4 - 3.5 gm/dL 3.6 High    Albumin/Globulin Ratio 1.1 - 2.0 ratio 1.1   Bilirubin Total <=1.5 mg/dL 0.5   ALP 40 - 150 unit/L 127   ALT 0 - 55 unit/L 13   AST 5 - 34 unit/L 28   eGFR mls/min/1.73/m2 >60   Resulting Agency  [facility]        HBA1C: No components found for: "HBA1C"    Ankle Brachial Index: 7/11/24 -   Right - 1.34  Left - 1.29    Imaging:    Plain film: No results found for this or any previous visit.    MRI: No results found for this or any previous visit.    Bone Scan: No results found for this or any previous visit.    Vascular studies:    7/11/24 -   Arterial:  FINDINGS:  Ultrasound Doppler and color flow imaging were performed on the arteries of the left lower extremity.  A tri phasic flow wave " pattern is evident throughout the visualized arteries of the left lower extremity no significant localized atherosclerotic plaque formation or focal stenosis noted to the visualized arteries of the left lower extremity.  The right ankle brachial index is 1.34 and the left is 1.29.  Impression:  1.  No significant localized atherosclerotic plaque formation or focal stenosis noted to the visualized arteries of theleft lower extremity.    Venous:  FINDINGS:  There is normal compression and flow noted in the  common femoral vein, superficial femoral vein, popliteal vein, and  posterior tibial veinbilaterally.  No evidence of deep venous thrombosis is identified. No significant venous reflux is identified.  The left greater saphenous vein has been surgically harvested.  Impression:  1. No deep venous thrombosis identified to the lower extremities bilaterally.    Microbiology:   7/24/24 - Staph Aureus - Doxy Rx sent        HOME HEALTH AGENCY:  none  WOUND CARE ORDERS:  Left medial lower leg wound: Cleanse with wound cleanser, apply zinc unna boot, kerlix, and coban to be changed on Tuesday and Fridays   Right anterior lower leg wound: Keep clean and dry, wear compression socks daily      Follow up in about 4 days (around 10/11/2024).

## 2024-10-11 ENCOUNTER — HOSPITAL ENCOUNTER (OUTPATIENT)
Dept: WOUND CARE | Facility: HOSPITAL | Age: 84
Discharge: HOME | End: 2024-10-11
Attending: FAMILY MEDICINE
Payer: MEDICARE

## 2024-10-11 VITALS
HEART RATE: 104 BPM | BODY MASS INDEX: 33.48 KG/M2 | SYSTOLIC BLOOD PRESSURE: 119 MMHG | WEIGHT: 274.94 LBS | DIASTOLIC BLOOD PRESSURE: 64 MMHG | HEIGHT: 76 IN | RESPIRATION RATE: 18 BRPM

## 2024-10-11 DIAGNOSIS — I87.2 CHRONIC VENOUS INSUFFICIENCY: Primary | ICD-10-CM

## 2024-10-11 DIAGNOSIS — L97.221 VENOUS STASIS ULCER OF LEFT CALF LIMITED TO BREAKDOWN OF SKIN WITHOUT VARICOSE VEINS: ICD-10-CM

## 2024-10-11 DIAGNOSIS — I87.2 VENOUS STASIS ULCER OF LEFT CALF LIMITED TO BREAKDOWN OF SKIN WITHOUT VARICOSE VEINS: ICD-10-CM

## 2024-10-11 PROCEDURE — 29580 STRAPPING UNNA BOOT: CPT

## 2024-10-11 PROCEDURE — 27000999 HC MEDICAL RECORD PHOTO DOCUMENTATION

## 2024-10-11 PROCEDURE — 99213 OFFICE O/P EST LOW 20 MIN: CPT | Mod: 25

## 2024-10-11 NOTE — PROGRESS NOTES
[facility]: None  Smoker   [] Yes   [x] No   Diabetic  [] Yes  [x] No   Vascular Surgeon: Dr. Carter  Vascular procedures [] Yes [x] No   If so, when and what was done?  Recent Ultrasounds [x] Yes [] No   If so, when were they done? (7/29/24 - venous/arterial)  Compression Pumps  [] Yes [x] No       Right ankle - 26.2 cm     Right calf - 44 cm     Left ankle - 27 cm     Left calf - 41 cm  Doppler done in office? [x] Yes [] No   Date: 7/10/24     Right dorsalis: monophasic     Right posterior: biphasic     Left dorsalis: biphasic     Left posterior: abnormal  Is the patient eligible for a graft, and/or currently grafting? [] Yes [x] No    If so, which one/size?    NOTES:  Nothing new at this time  Subjective:       Patient ID: Piter Farrell is a 84 y.o. male.    Chief Complaint: Venous Ulcer ((Left medial lower leg))    HPI    October 11, 2024:    84-year-old white male, who is here for follow up of chronic venous insufficiency with 2 very small left venous stasis ulcers.  He has been using Unna boots for a few weeks, only on the left leg.  He uses a Tubigrip on the right leg.    10/7/24 - Patient here for nurse visit - new blister has developed near original two wounds. Resumed use of original zinc unna boot.    10/4/24 - Mr. Farrell returns today for followup on venous ulcers at his left lower extremity.  The initial wound is considerably smaller today, however, since his last visit has had the development a 2nd larger blister.  A mechanical debridement was performed and we will re-apply the Unna boot today.    10/1/24 - The patient was seen today for a nurse visit only for an Unna Boot change for the left venous stasis ulcer.  No issues or concerns were brought to this providers attention during this visit.     September 27, 2024:  84-year-old white male, is here for follow up of the left venous stasis ulcer, chronic venous insufficiency.  He has been using a Tubigrip on the right leg, and Unna boot on the left  leg.  The venous stasis ulcer on the left calf is measuring the same as last week, but it is very clean.    9/24/24 - Mr. Farrell is seen today for a nurse visit only for an Unna Boot change for the left venous stasis ulcer.  No issues or concerns were brought to this providers attention during this visit    September 20, 2024:  84-year-old white male, who is here for follow up of the left calf venous stasis ulcer.  It is measuring smaller today.  He has been using an Unna boot.  The maceration around the edges is much improved.    9/17/24 - Mr. Farrell is seen today for a nurse visit only for an Unna Boot change for the left venous stasis ulcer.  No issues or concerns were brought to this providers attention during this visit.     September 13, 2024:  84-year-old white male, who is here for follow up of the left calf venous stasis ulcer.  The measurements are improving, but there still maceration around the edges.  He has been using Polymem.  The edema has greatly improved.  He is also using an Unna boot on the left leg at this time.    9/10/24 - Mr. Farrell is seen today for a nurse visit only for an Unna Boot change for the left venous stasis ulcer.  No issues or concerns were brought to this providers attention during this visit.     September 6, 2024:  84-year-old white male, who is here for follow up of his left venous stasis ulcer, chronic venous insufficiency.  The venous stasis ulcer is improving, with regard to measurements.  He has been using Polymem.  There still some maceration around the edges.  He is using boot on the left leg twice a week.    9/3/24 - The patient is seen today for a nurse visit only for an Unna Boot change for the left venous stasis ulcer.  No issues or concerns were brought to this providers attention during this visit.     8/30/24 - The patient followup on the venous stasis ulcers at his left lower extremity.  Today, that wound was quite macerated.  He does attempt to elevate as much as  possible, however, his son is in the nursing facility and so he does been a lot of time there with the legs in a dependent position.  Today, we will change from silver alginate to Polymem with the hopes that that product may hold the moisture a little better.  He will continue with the Unna boots and twice weekly visits as well.  Of note, compression stockings were ordered on Tuesday 8/27/24, he has not received them yet, we will check on this order. He has blood work and a follow up with Dr. Carter (cardio) in [facility]    8/27/24 - Mr. Farrell is seen today for a nurse visit only for an Unna Boot change for the left venous stasis ulcer.  No issues or concerns were brought to this providers attention during this visit.     August 23, 2024:  84-year-old white male, who is here for follow up of his chronic venous insufficiency, left calf venous stasis ulcer.  He has no open wounds on the right leg.  He will no longer need an Unna boot on the right leg.  The wound on the left calf is improving each week.    8/21/24 - Patient is seen today for a nurse visit only for Unna boot change.  No issues or complaints were brought to this provider's attention during this visit.     August 16, 2024:  84-year-old white male, who is here for a nurse visit only, to have his Unna boot changed.  There are no new issues.  He has venous stasis ulcers.    August 13, 2024:  84-year-old white male, who is here for follow up of his chronic venous insufficiency, left lower leg venous stasis ulcer.  These wounds are all improving.  The wound on his right lower leg has just about healed.  The edema is much improved with Unna boots.  He does have compression stockings at home, for future needs.      August 9, 2024:  84-year-old white male, who is here for a nurse visit only, to have his dressings changed.  There are no new issues.    August 7, 2024:    84-year-old white male, she of edema in his legs, and some venous stasis ulcers.  The recent  venous ultrasound showed good flow in all of his major veins.  There was no DVT.  His CHF appears to be well controlled.  He will an attempt at compression of both legs.  The venous stasis ulcers in his legs are somewhat stalled at this time.  There is a left lower medial venous stasis ulcer, with slough present.  It is slightly painful today.    7/31/24 - Mr. Farrell returns today for the bilateral lower extremity venous ulcers.  He did complete his cardiovascular U/S on 7/29/24.  Both his arterial and venous studies were normal.  He also had a wound culture done on 7/25/24 which shows 100% Staph aureus.  He was prescribed Doxycycline today x 10 days.  We will also be authorizing Dalvance infusion for him in treatment of Staph aureus.  We would like to begin compression with Unna Boots, however, the patient would first like to clear that with his cardiologist.  He does have an adequate EF.  He does have an appt with Dr. Carter (cardio) this afternoon at 2:30 to have a halter monitor placed due to his a-fib. He was given a copy of his vascular studies to review at that time and will discus compression. Today the wounds on the left leg were selectively debrided.  Santyl has been ordered and we discussed the cost.  If he is unable to obtain he will continue with Mesalt.     7/25/24 - The patient returns today for f/up on his bilateral lower extremity venous ulcers.  The wounds have all improved since his last visit on 7/17/24, however there is some slight erythema at the left lower extremity.  A culture was obtained of the left leg.  He is scheduled for bilateral venous/arterial US on 7/29/24. However, he is concerned about using compression and wound like to discuss it with his cardiologist, Dr. Carter.   Once the U/S are done, if there is any venous reflux noted these will be sent to Dr. Carter for evaluation and consultation with the patient prior to compression.     7/17/24 - Mr. Farrell is here today for followup on  his bilateral lower extremity venous ulcers as well as a small wound to the right 2nd toe.  The right 2nd toe was assessed and remains stable.  The scattered wounds at the lower extremities were selectively debrided.  They are beginning to make progress and there are couple of areas were epithelial tissue is beginning to my great from the edges.  He has completed his p.o. Cefzil.  We are anxious to begin compressing the lower extremities, however, we do not have arterial clearing at this time.  He is scheduled for cardiovascular ultrasounds on 07/29/2024 at 10:00 a.m..  We do have venous ultrasounds on file, however, we do not have arterial.  Once arterial ultrasounds or clear we will begin compressing using Unna boots.    7/10/24 - Mr. Farrell is an 84 year old  male who presents with bilateral lower extremity venous ulcers.  His PCP is Dr. Jesus Hernandez, who referred him to wound clinic.    4/16/24: saw PCP for multiple sores on lower extremities, was given Augmentin 875 mg BID and Bumetanide (no culture was obtained).  4/25/24: saw PCP again for bacterial infection of lower extremities, refilled Augmentin 875 mg BID for 5 days (no culture was obtained)  5/2/24: saw PCP for left leg, was given Silvadine  5/28/24: saw PCP, suggested that he use compression stockings and follow up with his cardiologist (Dr. Carter), he did not do either of these things  7/9/24: saw PCP given Lasix, Cefzil and referred to wound care  He has a history of triple bypass and CHF.   He states that he had venous US done on his legs about 1 month ago with Dr. Carter.  These results were reviewed.  No arterial U/S were performed and the study shows that it was incomplete due to patient resistance.   He is not a diabetic.   He states that he was seen in our clinic about 3 years ago for the same issue.    Today his bilateral lower extremities have multiple open areas that do appear venous in nature.  Because his venous ultrasounds were  in conclusive an incomplete we will be ordering full cardiovascular ultrasounds.  He does have 2+ edema as well as hemosiderin staining.  We would like to compress, however, until we have arterial clearing we are hesitant to do so as this has not been addressed.  We did have a lengthy discussion today regarding his congestive heart failure, the need to compress, the impact that compression may have on his CHF due to moving the fluid from his legs to his chest, as well as the need to verify his arterial status prior to compressing.  He is in agreement with all of this and today we will begin covering the open ulcers and applying a Tubigrip over the Kerlix to hold the dressings in place.  He was instructed that he should alternate short elevation of the legs with dependent hanging of the legs so that blood flow can continue to the feet but at the same time tried to begin offloading some of the legs.  He is ambulatory and therefore would be able to wear an Unna boot wound we are at that point.  He does been the majority of his time at a local skilled nursing facility with his handicapped son.   All 10 toe nails today were quite lengthy and were trimmed for the patient.    Review of Systems   Constitutional: Negative.    Respiratory: Negative.     Cardiovascular: Negative.    Skin:         As documented in the HPI.   All other systems reviewed and are negative.        Objective:        Vitals:    10/11/24 0848   BP: 119/64   Pulse: 104   Resp: 18       Physical Exam  Vitals and nursing note reviewed.   Constitutional:       Appearance: Normal appearance.   Cardiovascular:      Rate and Rhythm: Normal rate.   Pulmonary:      Effort: Pulmonary effort is normal.   Skin:     General: Skin is warm and dry.      Comments: There some chronic venous insufficiency, slightly worse on the left lower leg, but the edema has improved since last visit.  There are 2 small superficial venous stasis ulcers, I did a mechanical debridement  only.   Neurological:      Mental Status: He is alert.            Wound 07/10/24 1154 Venous Ulcer Left lower;medial Leg #2 (Active)   07/10/24 1154   Present on Original Admission: Y   Primary Wound Type: Venous ulcer   Side: Left   Orientation: lower;medial   Location: Leg   Wound Approximate Age at First Assessment (Weeks):    Wound Number: #2   Is this injury device related?:    Incision Type:    Closure Method:    Wound Description (Comments):    Type:    Additional Comments:    Ankle-Brachial Index:    Pulses:    Removal Indication and Assessment:    Wound Outcome:    Dressing Appearance Moist drainage 10/11/24 0849   Drainage Amount Moderate 10/11/24 0849   Drainage Characteristics/Odor Serosanguineous 10/11/24 0849   Appearance Red;Moist 10/11/24 0849   Tissue loss description Full thickness 10/11/24 0849   Periwound Area Edematous;Dry 10/11/24 0849   Wound Edges Open 10/11/24 0849   Wound Length (cm) 5 cm 10/11/24 0849   Wound Width (cm) 2 cm 10/11/24 0849   Wound Depth (cm) 0.2 cm 10/11/24 0849   Wound Volume (cm^3) 2 cm^3 10/11/24 0849   Wound Surface Area (cm^2) 10 cm^2 10/11/24 0849   Care Cleansed with:;Wound cleanser 10/11/24 0849   Dressing Applied 10/11/24 0849   Dressing Change Due 10/15/24 10/11/24 0849     10/7/24    Left lower medial leg (pre)  (Zinc unna boot, kerlix, coban)    10/11/24    Left lower leg (pre)        Assessment:         ICD-10-CM ICD-9-CM   1. Chronic venous insufficiency  I87.2 459.81   2. Venous stasis ulcer of left calf limited to breakdown of skin without varicose veins  I87.2 459.81    L97.221 707.12             Procedures:     Mechanical debridement only    [] Yes [] No   I & D performed  [] Yes [] No   Excisional debridement performed  [] Yes [] No   Selective debridement performed           [x] Yes [] No   Mechanical debridement performed         [] Yes [] No  Silver nitrate applied                                     [] Yes [] No  Labs ordered this visit                 "                  [] Yes [] No   Imaging ordered this visit                           [] Yes [] No   Tissue pathology and/or culture taken         MEDICATIONS    Current Outpatient Medications:     LASIX 40 mg tablet, Take 40 mg by mouth., Disp: , Rfl:     metoprolol succinate (TOPROL-XL) 25 MG 24 hr tablet, Take 50 mg by mouth 2 (two) times daily., Disp: , Rfl:     potassium chloride SA (K-DUR,KLOR-CON) 20 MEQ tablet, Take 20 mEq by mouth., Disp: , Rfl:     rosuvastatin (CRESTOR) 20 MG tablet, Take 20 mg by mouth every evening., Disp: , Rfl:     tamsulosin (FLOMAX) 0.4 mg Cap, Take 0.4 mg by mouth., Disp: , Rfl:     XARELTO 20 mg Tab, Take 20 mg by mouth., Disp: , Rfl:     mupirocin (BACTROBAN) 2 % ointment, , Disp: , Rfl:     SSD 1 % cream, , Disp: , Rfl:  Review of patient's allergies indicates:  No Known Allergies    DIAGNOSTICS    Labs/Scans/Micro:    CBC:   Lab Results   Component Value Date    WBC 3.88 (L) 09/11/2023    HGB 12.8 (L) 09/11/2023    HCT 39.7 (L) 09/11/2023    MCV 90.8 09/11/2023     09/11/2023     Sedimentation rate: No results found for: "SEDRATE"     C-reactive protein: No results found for: "CRP"     Chemistry: 3/12/24  Comprehensive Metabolic Panel       Component Ref Range & Units 4 mo ago  (3/12/24)   Sodium 136 - 145 mmol/L 136   Potassium 3.5 - 5.1 mmol/L 4.2   Chloride 98 - 107 mmol/L 100   CO2 23 - 31 mmol/L 27   Glucose 82 - 115 mg/dL 105   Blood Urea Nitrogen 8.4 - 25.7 mg/dL 21.0   Creatinine 0.73 - 1.18 mg/dL 0.82   Calcium 8.8 - 10.0 mg/dL 9.6   Protein Total 5.8 - 7.6 gm/dL 7.4   Albumin 3.4 - 4.8 g/dL 3.8   Globulin 2.4 - 3.5 gm/dL 3.6 High    Albumin/Globulin Ratio 1.1 - 2.0 ratio 1.1   Bilirubin Total <=1.5 mg/dL 0.5   ALP 40 - 150 unit/L 127   ALT 0 - 55 unit/L 13   AST 5 - 34 unit/L 28   eGFR mls/min/1.73/m2 >60   Resulting Agency  [facility]        HBA1C: No components found for: "HBA1C"    Ankle Brachial Index: 7/11/24 -   Right - 1.34  Left - " 1.29    Imaging:    Plain film: No results found for this or any previous visit.    MRI: No results found for this or any previous visit.    Bone Scan: No results found for this or any previous visit.    Vascular studies:    7/11/24 -   Arterial:  FINDINGS:  Ultrasound Doppler and color flow imaging were performed on the arteries of the left lower extremity.  A tri phasic flow wave pattern is evident throughout the visualized arteries of the left lower extremity no significant localized atherosclerotic plaque formation or focal stenosis noted to the visualized arteries of the left lower extremity.  The right ankle brachial index is 1.34 and the left is 1.29.  Impression:  1.  No significant localized atherosclerotic plaque formation or focal stenosis noted to the visualized arteries of theleft lower extremity.    Venous:  FINDINGS:  There is normal compression and flow noted in the  common femoral vein, superficial femoral vein, popliteal vein, and  posterior tibial veinbilaterally.  No evidence of deep venous thrombosis is identified. No significant venous reflux is identified.  The left greater saphenous vein has been surgically harvested.  Impression:  1. No deep venous thrombosis identified to the lower extremities bilaterally.    Microbiology:   7/24/24 - Staph Aureus - Doxy Rx sent        HOME HEALTH AGENCY:  none  WOUND CARE ORDERS:  Left medial lower leg wound: Cleanse with wound cleanser, apply zinc unna boot, kerlix, and coban to be changed on Tuesday and Fridays   Right anterior lower leg wound: Keep clean and dry, wear compression socks daily      Follow up in 4 days (on 10/15/2024) for left leg.  This will be a nurse visit.

## 2024-10-15 ENCOUNTER — HOSPITAL ENCOUNTER (OUTPATIENT)
Dept: WOUND CARE | Facility: HOSPITAL | Age: 84
Discharge: HOME | End: 2024-10-15
Attending: NURSE PRACTITIONER
Payer: MEDICARE

## 2024-10-15 VITALS
DIASTOLIC BLOOD PRESSURE: 64 MMHG | SYSTOLIC BLOOD PRESSURE: 129 MMHG | HEIGHT: 76 IN | RESPIRATION RATE: 18 BRPM | BODY MASS INDEX: 33.48 KG/M2 | WEIGHT: 274.94 LBS | HEART RATE: 98 BPM

## 2024-10-15 DIAGNOSIS — I87.2 CHRONIC VENOUS INSUFFICIENCY: Primary | ICD-10-CM

## 2024-10-15 DIAGNOSIS — L97.221 VENOUS STASIS ULCER OF LEFT CALF LIMITED TO BREAKDOWN OF SKIN WITHOUT VARICOSE VEINS: ICD-10-CM

## 2024-10-15 DIAGNOSIS — I87.2 VENOUS STASIS ULCER OF LEFT CALF LIMITED TO BREAKDOWN OF SKIN WITHOUT VARICOSE VEINS: ICD-10-CM

## 2024-10-15 PROCEDURE — 29580 STRAPPING UNNA BOOT: CPT

## 2024-10-15 PROCEDURE — 99211 OFF/OP EST MAY X REQ PHY/QHP: CPT | Mod: 25

## 2024-10-15 PROCEDURE — 27000999 HC MEDICAL RECORD PHOTO DOCUMENTATION

## 2024-10-15 NOTE — PROGRESS NOTES
[facility]: None  Smoker   [] Yes   [x] No   Diabetic  [] Yes  [x] No   Vascular Surgeon: Dr. Carter  Vascular procedures [] Yes [x] No   If so, when and what was done?  Recent Ultrasounds [x] Yes [] No   If so, when were they done? (7/29/24 - venous/arterial)  Compression Pumps  [] Yes [x] No       Right ankle - 26.2 cm     Right calf - 44 cm     Left ankle - 27 cm     Left calf - 41 cm  Doppler done in office? [x] Yes [] No   Date: 7/10/24     Right dorsalis: monophasic     Right posterior: biphasic     Left dorsalis: biphasic     Left posterior: abnormal  Is the patient eligible for a graft, and/or currently grafting? [] Yes [x] No    If so, which one/size?    Subjective:       Patient ID: Piter Farrell is a 84 y.o. male.    Chief Complaint: Venous Ulcer (Left medial lower leg)    HPI    10/15/24 - The patient was seen today for a nurse visit only for an Unna Boot change for the left venous stasis ulcer.  No issues or concerns were brought to this providers attention during this visit.     October 11, 2024:    84-year-old white male, who is here for follow up of chronic venous insufficiency with 2 very small left venous stasis ulcers.  He has been using Unna boots for a few weeks, only on the left leg.  He uses a Tubigrip on the right leg.    10/7/24 - Patient here for nurse visit - new blister has developed near original two wounds. Resumed use of original zinc unna boot.    10/4/24 - Mr. Farrell returns today for followup on venous ulcers at his left lower extremity.  The initial wound is considerably smaller today, however, since his last visit has had the development a 2nd larger blister.  A mechanical debridement was performed and we will re-apply the Unna boot today.    10/1/24 - The patient was seen today for a nurse visit only for an Unna Boot change for the left venous stasis ulcer.  No issues or concerns were brought to this providers attention during this visit.     September 27, 2024:  84-year-old  white male, is here for follow up of the left venous stasis ulcer, chronic venous insufficiency.  He has been using a Tubigrip on the right leg, and Unna boot on the left leg.  The venous stasis ulcer on the left calf is measuring the same as last week, but it is very clean.    9/24/24 - Mr. Farrell is seen today for a nurse visit only for an Unna Boot change for the left venous stasis ulcer.  No issues or concerns were brought to this providers attention during this visit    September 20, 2024:  84-year-old white male, who is here for follow up of the left calf venous stasis ulcer.  It is measuring smaller today.  He has been using an Unna boot.  The maceration around the edges is much improved.    9/17/24 - Mr. Farrell is seen today for a nurse visit only for an Unna Boot change for the left venous stasis ulcer.  No issues or concerns were brought to this providers attention during this visit.     September 13, 2024:  84-year-old white male, who is here for follow up of the left calf venous stasis ulcer.  The measurements are improving, but there still maceration around the edges.  He has been using Polymem.  The edema has greatly improved.  He is also using an Unna boot on the left leg at this time.    9/10/24 - Mr. Farrell is seen today for a nurse visit only for an Unna Boot change for the left venous stasis ulcer.  No issues or concerns were brought to this providers attention during this visit.     September 6, 2024:  84-year-old white male, who is here for follow up of his left venous stasis ulcer, chronic venous insufficiency.  The venous stasis ulcer is improving, with regard to measurements.  He has been using Polymem.  There still some maceration around the edges.  He is using boot on the left leg twice a week.    9/3/24 - The patient is seen today for a nurse visit only for an Unna Boot change for the left venous stasis ulcer.  No issues or concerns were brought to this providers attention during this visit.      8/30/24 - The patient followup on the venous stasis ulcers at his left lower extremity.  Today, that wound was quite macerated.  He does attempt to elevate as much as possible, however, his son is in the nursing facility and so he does been a lot of time there with the legs in a dependent position.  Today, we will change from silver alginate to Polymem with the hopes that that product may hold the moisture a little better.  He will continue with the Unna boots and twice weekly visits as well.  Of note, compression stockings were ordered on Tuesday 8/27/24, he has not received them yet, we will check on this order. He has blood work and a follow up with Dr. Carter (cardio) in Novemer 8/27/24 - Mr. Farrell is seen today for a nurse visit only for an Unna Boot change for the left venous stasis ulcer.  No issues or concerns were brought to this providers attention during this visit.     August 23, 2024:  84-year-old white male, who is here for follow up of his chronic venous insufficiency, left calf venous stasis ulcer.  He has no open wounds on the right leg.  He will no longer need an Unna boot on the right leg.  The wound on the left calf is improving each week.    8/21/24 - Patient is seen today for a nurse visit only for Unna boot change.  No issues or complaints were brought to this provider's attention during this visit.     August 16, 2024:  84-year-old white male, who is here for a nurse visit only, to have his Unna boot changed.  There are no new issues.  He has venous stasis ulcers.    August 13, 2024:  84-year-old white male, who is here for follow up of his chronic venous insufficiency, left lower leg venous stasis ulcer.  These wounds are all improving.  The wound on his right lower leg has just about healed.  The edema is much improved with Unna boots.  He does have compression stockings at home, for future needs.      August 9, 2024:  84-year-old white male, who is here for a nurse visit only, to  have his dressings changed.  There are no new issues.    August 7, 2024:    84-year-old white male, she of edema in his legs, and some venous stasis ulcers.  The recent venous ultrasound showed good flow in all of his major veins.  There was no DVT.  His CHF appears to be well controlled.  He will an attempt at compression of both legs.  The venous stasis ulcers in his legs are somewhat stalled at this time.  There is a left lower medial venous stasis ulcer, with slough present.  It is slightly painful today.    7/31/24 - Mr. Farrell returns today for the bilateral lower extremity venous ulcers.  He did complete his cardiovascular U/S on 7/29/24.  Both his arterial and venous studies were normal.  He also had a wound culture done on 7/25/24 which shows 100% Staph aureus.  He was prescribed Doxycycline today x 10 days.  We will also be authorizing Dalvance infusion for him in treatment of Staph aureus.  We would like to begin compression with Unna Boots, however, the patient would first like to clear that with his cardiologist.  He does have an adequate EF.  He does have an appt with Dr. Carter (cardio) this afternoon at 2:30 to have a halter monitor placed due to his a-fib. He was given a copy of his vascular studies to review at that time and will discus compression. Today the wounds on the left leg were selectively debrided.  Santyl has been ordered and we discussed the cost.  If he is unable to obtain he will continue with Mesalt.     7/25/24 - The patient returns today for f/up on his bilateral lower extremity venous ulcers.  The wounds have all improved since his last visit on 7/17/24, however there is some slight erythema at the left lower extremity.  A culture was obtained of the left leg.  He is scheduled for bilateral venous/arterial US on 7/29/24. However, he is concerned about using compression and wound like to discuss it with his cardiologist, Dr. Carter.   Once the U/S are done, if there is any venous  reflux noted these will be sent to Dr. Carter for evaluation and consultation with the patient prior to compression.     7/17/24 - Mr. Farrell is here today for followup on his bilateral lower extremity venous ulcers as well as a small wound to the right 2nd toe.  The right 2nd toe was assessed and remains stable.  The scattered wounds at the lower extremities were selectively debrided.  They are beginning to make progress and there are couple of areas were epithelial tissue is beginning to my great from the edges.  He has completed his p.o. Cefzil.  We are anxious to begin compressing the lower extremities, however, we do not have arterial clearing at this time.  He is scheduled for cardiovascular ultrasounds on 07/29/2024 at 10:00 a.m..  We do have venous ultrasounds on file, however, we do not have arterial.  Once arterial ultrasounds or clear we will begin compressing using Unna boots.    7/10/24 - Mr. Farrell is an 84 year old  male who presents with bilateral lower extremity venous ulcers.  His PCP is Dr. Jesus Hernandez, who referred him to wound clinic.    4/16/24: saw PCP for multiple sores on lower extremities, was given Augmentin 875 mg BID and Bumetanide (no culture was obtained).  4/25/24: saw PCP again for bacterial infection of lower extremities, refilled Augmentin 875 mg BID for 5 days (no culture was obtained)  5/2/24: saw PCP for left leg, was given Silvadine  5/28/24: saw PCP, suggested that he use compression stockings and follow up with his cardiologist (Dr. Carter), he did not do either of these things  7/9/24: saw PCP given Lasix, Cefzil and referred to wound care  He has a history of triple bypass and CHF.   He states that he had venous US done on his legs about 1 month ago with Dr. Carter.  These results were reviewed.  No arterial U/S were performed and the study shows that it was incomplete due to patient resistance.   He is not a diabetic.   He states that he was seen in our clinic  about 3 years ago for the same issue.    Today his bilateral lower extremities have multiple open areas that do appear venous in nature.  Because his venous ultrasounds were in conclusive an incomplete we will be ordering full cardiovascular ultrasounds.  He does have 2+ edema as well as hemosiderin staining.  We would like to compress, however, until we have arterial clearing we are hesitant to do so as this has not been addressed.  We did have a lengthy discussion today regarding his congestive heart failure, the need to compress, the impact that compression may have on his CHF due to moving the fluid from his legs to his chest, as well as the need to verify his arterial status prior to compressing.  He is in agreement with all of this and today we will begin covering the open ulcers and applying a Tubigrip over the Kerlix to hold the dressings in place.  He was instructed that he should alternate short elevation of the legs with dependent hanging of the legs so that blood flow can continue to the feet but at the same time tried to begin offloading some of the legs.  He is ambulatory and therefore would be able to wear an Unna boot wound we are at that point.  He does been the majority of his time at a local skilled nursing facility with his handicapped son.   All 10 toe nails today were quite lengthy and were trimmed for the patient.    Review of Systems   Constitutional: Negative.    Respiratory: Negative.     Cardiovascular: Negative.    Skin:         As documented in the HPI.   All other systems reviewed and are negative.        Objective:        Vitals:    10/15/24 0817   BP: 129/64   Pulse: 98   Resp: 18       Physical Exam  Vitals and nursing note reviewed.   Constitutional:       Appearance: Normal appearance.   Cardiovascular:      Rate and Rhythm: Normal rate.   Pulmonary:      Effort: Pulmonary effort is normal.   Skin:     General: Skin is warm and dry.      Comments: There some chronic venous  insufficiency, slightly worse on the left lower leg, but the edema has improved since last visit.  There are 2 small superficial venous stasis ulcers, I did a mechanical debridement only.   Neurological:      Mental Status: He is alert.            Wound 07/10/24 1154 Venous Ulcer Left lower;medial Leg #2 (Active)   07/10/24 1154   Present on Original Admission: Y   Primary Wound Type: Venous ulcer   Side: Left   Orientation: lower;medial   Location: Leg   Wound Approximate Age at First Assessment (Weeks):    Wound Number: #2   Is this injury device related?:    Incision Type:    Closure Method:    Wound Description (Comments):    Type:    Additional Comments:    Ankle-Brachial Index:    Pulses:    Removal Indication and Assessment:    Wound Outcome:    Dressing Appearance Moist drainage 10/15/24 0818   Drainage Amount Moderate 10/15/24 0818   Drainage Characteristics/Odor Serosanguineous 10/15/24 0818   Appearance Moist;Red 10/15/24 0818   Tissue loss description Full thickness 10/15/24 0818   Periwound Area Edematous 10/15/24 0818   Wound Edges Open 10/15/24 0818   Wound Length (cm) 4.5 cm 10/15/24 0818   Wound Width (cm) 1 cm 10/15/24 0818   Wound Depth (cm) 0.2 cm 10/15/24 0818   Wound Volume (cm^3) 0.9 cm^3 10/15/24 0818   Wound Surface Area (cm^2) 4.5 cm^2 10/15/24 0818   Care Cleansed with:;Wound cleanser 10/15/24 0818   Dressing Applied;Other (comment) 10/15/24 0818   Dressing Change Due 10/18/24 10/15/24 0818     10/15/24    Left leg  Zinc unna boot, kerlix, coban   CT      Assessment:       No diagnosis found.      Procedures:     Nurse visit today     [] Yes [] No   I & D performed  [] Yes [] No   Excisional debridement performed  [] Yes [] No   Selective debridement performed           [] Yes [] No   Mechanical debridement performed         [] Yes [] No  Silver nitrate applied                                     [] Yes [] No  Labs ordered this visit                                  [] Yes [] No   Imaging  "ordered this visit                           [] Yes [] No   Tissue pathology and/or culture taken         MEDICATIONS    Current Outpatient Medications:     LASIX 40 mg tablet, Take 40 mg by mouth., Disp: , Rfl:     metoprolol succinate (TOPROL-XL) 25 MG 24 hr tablet, Take 50 mg by mouth 2 (two) times daily., Disp: , Rfl:     potassium chloride SA (K-DUR,KLOR-CON) 20 MEQ tablet, Take 20 mEq by mouth., Disp: , Rfl:     rosuvastatin (CRESTOR) 20 MG tablet, Take 20 mg by mouth every evening., Disp: , Rfl:     tamsulosin (FLOMAX) 0.4 mg Cap, Take 0.4 mg by mouth., Disp: , Rfl:     XARELTO 20 mg Tab, Take 20 mg by mouth., Disp: , Rfl:     mupirocin (BACTROBAN) 2 % ointment, , Disp: , Rfl:     SSD 1 % cream, , Disp: , Rfl:  Review of patient's allergies indicates:  No Known Allergies    DIAGNOSTICS    Labs/Scans/Micro:    CBC:   Lab Results   Component Value Date    WBC 3.88 (L) 09/11/2023    HGB 12.8 (L) 09/11/2023    HCT 39.7 (L) 09/11/2023    MCV 90.8 09/11/2023     09/11/2023     Sedimentation rate: No results found for: "SEDRATE"     C-reactive protein: No results found for: "CRP"     Chemistry: 3/12/24  Comprehensive Metabolic Panel       Component Ref Range & Units 4 mo ago  (3/12/24)   Sodium 136 - 145 mmol/L 136   Potassium 3.5 - 5.1 mmol/L 4.2   Chloride 98 - 107 mmol/L 100   CO2 23 - 31 mmol/L 27   Glucose 82 - 115 mg/dL 105   Blood Urea Nitrogen 8.4 - 25.7 mg/dL 21.0   Creatinine 0.73 - 1.18 mg/dL 0.82   Calcium 8.8 - 10.0 mg/dL 9.6   Protein Total 5.8 - 7.6 gm/dL 7.4   Albumin 3.4 - 4.8 g/dL 3.8   Globulin 2.4 - 3.5 gm/dL 3.6 High    Albumin/Globulin Ratio 1.1 - 2.0 ratio 1.1   Bilirubin Total <=1.5 mg/dL 0.5   ALP 40 - 150 unit/L 127   ALT 0 - 55 unit/L 13   AST 5 - 34 unit/L 28   eGFR mls/min/1.73/m2 >60   Resulting Agency  [facility]        HBA1C: No components found for: "HBA1C"    Ankle Brachial Index: 7/11/24 -   Right - 1.34  Left - 1.29    Imaging:    Plain film: No results found for this or any " previous visit.    MRI: No results found for this or any previous visit.    Bone Scan: No results found for this or any previous visit.    Vascular studies:    7/11/24 -   Arterial:  FINDINGS:  Ultrasound Doppler and color flow imaging were performed on the arteries of the left lower extremity.  A tri phasic flow wave pattern is evident throughout the visualized arteries of the left lower extremity no significant localized atherosclerotic plaque formation or focal stenosis noted to the visualized arteries of the left lower extremity.  The right ankle brachial index is 1.34 and the left is 1.29.  Impression:  1.  No significant localized atherosclerotic plaque formation or focal stenosis noted to the visualized arteries of theleft lower extremity.    Venous:  FINDINGS:  There is normal compression and flow noted in the  common femoral vein, superficial femoral vein, popliteal vein, and  posterior tibial veinbilaterally.  No evidence of deep venous thrombosis is identified. No significant venous reflux is identified.  The left greater saphenous vein has been surgically harvested.  Impression:  1. No deep venous thrombosis identified to the lower extremities bilaterally.    Microbiology:   7/24/24 - Staph Aureus - Doxy Rx sent        HOME HEALTH AGENCY:  none  WOUND CARE ORDERS:  Left medial lower leg wound: Cleanse with wound cleanser, apply zinc unna boot, kerlix, and coban to be changed on Tuesday and Fridays   Right anterior lower leg wound: Keep clean and dry, wear compression socks daily      Follow up in 3 days (on 10/18/2024) for left leg .  This will be a nurse visit.

## 2024-10-18 ENCOUNTER — HOSPITAL ENCOUNTER (OUTPATIENT)
Dept: WOUND CARE | Facility: HOSPITAL | Age: 84
Discharge: HOME | End: 2024-10-18
Attending: FAMILY MEDICINE
Payer: MEDICARE

## 2024-10-18 VITALS
RESPIRATION RATE: 18 BRPM | BODY MASS INDEX: 33.48 KG/M2 | HEART RATE: 101 BPM | WEIGHT: 274.94 LBS | SYSTOLIC BLOOD PRESSURE: 136 MMHG | HEIGHT: 76 IN | DIASTOLIC BLOOD PRESSURE: 76 MMHG

## 2024-10-18 DIAGNOSIS — I87.2 CHRONIC VENOUS INSUFFICIENCY: Primary | ICD-10-CM

## 2024-10-18 PROCEDURE — 29580 STRAPPING UNNA BOOT: CPT

## 2024-10-18 PROCEDURE — 99213 OFFICE O/P EST LOW 20 MIN: CPT | Mod: 25

## 2024-10-18 PROCEDURE — 27000999 HC MEDICAL RECORD PHOTO DOCUMENTATION

## 2024-10-18 NOTE — PROGRESS NOTES
[facility]: None  Smoker   [] Yes   [x] No   Diabetic  [] Yes  [x] No   Vascular Surgeon: Dr. Carter  Vascular procedures [] Yes [x] No   If so, when and what was done?  Recent Ultrasounds [x] Yes [] No   If so, when were they done? (7/29/24 - venous/arterial)  Compression Pumps  [] Yes [x] No       Right ankle - 26 cm     Right calf - 45 cm     Left ankle - 24.5 cm     Left calf - 42 cm  Doppler done in office? [x] Yes [] No   Date: 7/10/24     Right dorsalis: monophasic     Right posterior: biphasic     Left dorsalis: biphasic     Left posterior: abnormal  Is the patient eligible for a graft, and/or currently grafting? [] Yes [x] No    If so, which one/size?    NOTES:  Has follow up with Dr. Carter on Monday 10/21/24  Subjective:       Patient ID: Piter Farrell is a 84 y.o. male.    Chief Complaint: Venous Ulcer ( (Left medial lower leg))    HPI    October 18, 2024:  84-year-old white male, who is here for follow up of his chronic bilateral venous insufficiency.  His venous stasis ulcers have now healed.  He will be using his compression stockings at home.  The edema has resolved.    10/15/24 - The patient was seen today for a nurse visit only for an Unna Boot change for the left venous stasis ulcer.  No issues or concerns were brought to this providers attention during this visit.     October 11, 2024:    84-year-old white male, who is here for follow up of chronic venous insufficiency with 2 very small left venous stasis ulcers.  He has been using Unna boots for a few weeks, only on the left leg.  He uses a Tubigrip on the right leg.    10/7/24 - Patient here for nurse visit - new blister has developed near original two wounds. Resumed use of original zinc unna boot.    10/4/24 - Mr. Farrell returns today for followup on venous ulcers at his left lower extremity.  The initial wound is considerably smaller today, however, since his last visit has had the development a 2nd larger blister.  A mechanical debridement  was performed and we will re-apply the Unna boot today.    10/1/24 - The patient was seen today for a nurse visit only for an Unna Boot change for the left venous stasis ulcer.  No issues or concerns were brought to this providers attention during this visit.     September 27, 2024:  84-year-old white male, is here for follow up of the left venous stasis ulcer, chronic venous insufficiency.  He has been using a Tubigrip on the right leg, and Unna boot on the left leg.  The venous stasis ulcer on the left calf is measuring the same as last week, but it is very clean.    9/24/24 - Mr. Farrell is seen today for a nurse visit only for an Unna Boot change for the left venous stasis ulcer.  No issues or concerns were brought to this providers attention during this visit    September 20, 2024:  84-year-old white male, who is here for follow up of the left calf venous stasis ulcer.  It is measuring smaller today.  He has been using an Unna boot.  The maceration around the edges is much improved.    9/17/24 - Mr. Farrell is seen today for a nurse visit only for an Unna Boot change for the left venous stasis ulcer.  No issues or concerns were brought to this providers attention during this visit.     September 13, 2024:  84-year-old white male, who is here for follow up of the left calf venous stasis ulcer.  The measurements are improving, but there still maceration around the edges.  He has been using Polymem.  The edema has greatly improved.  He is also using an Unna boot on the left leg at this time.    9/10/24 - Mr. Farrell is seen today for a nurse visit only for an Unna Boot change for the left venous stasis ulcer.  No issues or concerns were brought to this providers attention during this visit.     September 6, 2024:  84-year-old white male, who is here for follow up of his left venous stasis ulcer, chronic venous insufficiency.  The venous stasis ulcer is improving, with regard to measurements.  He has been using Polymem.   There still some maceration around the edges.  He is using boot on the left leg twice a week.    9/3/24 - The patient is seen today for a nurse visit only for an Unna Boot change for the left venous stasis ulcer.  No issues or concerns were brought to this providers attention during this visit.     8/30/24 - The patient followup on the venous stasis ulcers at his left lower extremity.  Today, that wound was quite macerated.  He does attempt to elevate as much as possible, however, his son is in the nursing facility and so he does been a lot of time there with the legs in a dependent position.  Today, we will change from silver alginate to Polymem with the hopes that that product may hold the moisture a little better.  He will continue with the Unna boots and twice weekly visits as well.  Of note, compression stockings were ordered on Tuesday 8/27/24, he has not received them yet, we will check on this order. He has blood work and a follow up with Dr. Carter (cardio) in [facility]    8/27/24 - Mr. Farrell is seen today for a nurse visit only for an Unna Boot change for the left venous stasis ulcer.  No issues or concerns were brought to this providers attention during this visit.     August 23, 2024:  84-year-old white male, who is here for follow up of his chronic venous insufficiency, left calf venous stasis ulcer.  He has no open wounds on the right leg.  He will no longer need an Unna boot on the right leg.  The wound on the left calf is improving each week.    8/21/24 - Patient is seen today for a nurse visit only for Unna boot change.  No issues or complaints were brought to this provider's attention during this visit.     August 16, 2024:  84-year-old white male, who is here for a nurse visit only, to have his Unna boot changed.  There are no new issues.  He has venous stasis ulcers.    August 13, 2024:  84-year-old white male, who is here for follow up of his chronic venous insufficiency, left lower leg venous  stasis ulcer.  These wounds are all improving.  The wound on his right lower leg has just about healed.  The edema is much improved with Unna boots.  He does have compression stockings at home, for future needs.      August 9, 2024:  84-year-old white male, who is here for a nurse visit only, to have his dressings changed.  There are no new issues.    August 7, 2024:    84-year-old white male, she of edema in his legs, and some venous stasis ulcers.  The recent venous ultrasound showed good flow in all of his major veins.  There was no DVT.  His CHF appears to be well controlled.  He will an attempt at compression of both legs.  The venous stasis ulcers in his legs are somewhat stalled at this time.  There is a left lower medial venous stasis ulcer, with slough present.  It is slightly painful today.    7/31/24 - Mr. Farrell returns today for the bilateral lower extremity venous ulcers.  He did complete his cardiovascular U/S on 7/29/24.  Both his arterial and venous studies were normal.  He also had a wound culture done on 7/25/24 which shows 100% Staph aureus.  He was prescribed Doxycycline today x 10 days.  We will also be authorizing Dalvance infusion for him in treatment of Staph aureus.  We would like to begin compression with Unna Boots, however, the patient would first like to clear that with his cardiologist.  He does have an adequate EF.  He does have an appt with Dr. Carter (cardio) this afternoon at 2:30 to have a halter monitor placed due to his a-fib. He was given a copy of his vascular studies to review at that time and will discus compression. Today the wounds on the left leg were selectively debrided.  Santyl has been ordered and we discussed the cost.  If he is unable to obtain he will continue with Mesalt.     7/25/24 - The patient returns today for f/up on his bilateral lower extremity venous ulcers.  The wounds have all improved since his last visit on 7/17/24, however there is some slight  erythema at the left lower extremity.  A culture was obtained of the left leg.  He is scheduled for bilateral venous/arterial US on 7/29/24. However, he is concerned about using compression and wound like to discuss it with his cardiologist, Dr. Carter.   Once the U/S are done, if there is any venous reflux noted these will be sent to Dr. Carter for evaluation and consultation with the patient prior to compression.     7/17/24 - Mr. Farrell is here today for followup on his bilateral lower extremity venous ulcers as well as a small wound to the right 2nd toe.  The right 2nd toe was assessed and remains stable.  The scattered wounds at the lower extremities were selectively debrided.  They are beginning to make progress and there are couple of areas were epithelial tissue is beginning to my great from the edges.  He has completed his p.o. Cefzil.  We are anxious to begin compressing the lower extremities, however, we do not have arterial clearing at this time.  He is scheduled for cardiovascular ultrasounds on 07/29/2024 at 10:00 a.m..  We do have venous ultrasounds on file, however, we do not have arterial.  Once arterial ultrasounds or clear we will begin compressing using Unna boots.    7/10/24 - Mr. Farrell is an 84 year old  male who presents with bilateral lower extremity venous ulcers.  His PCP is Dr. Jesus Hernandez, who referred him to wound clinic.    4/16/24: saw PCP for multiple sores on lower extremities, was given Augmentin 875 mg BID and Bumetanide (no culture was obtained).  4/25/24: saw PCP again for bacterial infection of lower extremities, refilled Augmentin 875 mg BID for 5 days (no culture was obtained)  5/2/24: saw PCP for left leg, was given Silvadine  5/28/24: saw PCP, suggested that he use compression stockings and follow up with his cardiologist (Dr. Carter), he did not do either of these things  7/9/24: saw PCP given Lasix, Cefzil and referred to wound care  He has a history of triple  bypass and CHF.   He states that he had venous US done on his legs about 1 month ago with Dr. Carter.  These results were reviewed.  No arterial U/S were performed and the study shows that it was incomplete due to patient resistance.   He is not a diabetic.   He states that he was seen in our clinic about 3 years ago for the same issue.    Today his bilateral lower extremities have multiple open areas that do appear venous in nature.  Because his venous ultrasounds were in conclusive an incomplete we will be ordering full cardiovascular ultrasounds.  He does have 2+ edema as well as hemosiderin staining.  We would like to compress, however, until we have arterial clearing we are hesitant to do so as this has not been addressed.  We did have a lengthy discussion today regarding his congestive heart failure, the need to compress, the impact that compression may have on his CHF due to moving the fluid from his legs to his chest, as well as the need to verify his arterial status prior to compressing.  He is in agreement with all of this and today we will begin covering the open ulcers and applying a Tubigrip over the Kerlix to hold the dressings in place.  He was instructed that he should alternate short elevation of the legs with dependent hanging of the legs so that blood flow can continue to the feet but at the same time tried to begin offloading some of the legs.  He is ambulatory and therefore would be able to wear an Unna boot wound we are at that point.  He does been the majority of his time at a local skilled nursing facility with his handicapped son.   All 10 toe nails today were quite lengthy and were trimmed for the patient.    Review of Systems   Constitutional: Negative.    Respiratory: Negative.     Cardiovascular: Negative.    Skin:         As documented in the HPI.   All other systems reviewed and are negative.        Objective:        Vitals:    10/18/24 0748   BP: 136/76   Pulse: 101   Resp: 18        Physical Exam  Vitals and nursing note reviewed.   Constitutional:       Appearance: Normal appearance.   Cardiovascular:      Rate and Rhythm: Normal rate.   Pulmonary:      Effort: Pulmonary effort is normal.   Skin:     General: Skin is warm and dry.      Comments: Both his legs reveal no venous stasis ulcers.  He has very minimal edema.   Neurological:      Mental Status: He is alert.            Wound 07/10/24 1154 Venous Ulcer Left lower;medial Leg #2 (Active)   07/10/24 1154   Present on Original Admission: Y   Primary Wound Type: Venous ulcer   Side: Left   Orientation: lower;medial   Location: Leg   Wound Approximate Age at First Assessment (Weeks):    Wound Number: #2   Is this injury device related?:    Incision Type:    Closure Method:    Wound Description (Comments):    Type:    Additional Comments:    Ankle-Brachial Index:    Pulses:    Removal Indication and Assessment:    Wound Outcome:    Dressing Appearance Dry;Intact 10/18/24 0748   Drainage Amount None 10/18/24 0748   Appearance Pink;Dry 10/18/24 0748   Tissue loss description Full thickness 10/18/24 0748   Periwound Area Dry;Edematous 10/18/24 0748   Wound Length (cm) 0.2 cm 10/18/24 0748   Wound Width (cm) 0.2 cm 10/18/24 0748   Wound Depth (cm) 0.2 cm 10/18/24 0748   Wound Volume (cm^3) 0.008 cm^3 10/18/24 0748   Wound Surface Area (cm^2) 0.04 cm^2 10/18/24 0748   Care Cleansed with:;Wound cleanser 10/18/24 0748   Dressing Applied 10/18/24 0748     10/15/24    Left leg  Zinc unna boot, kerlix, coban   CT    10/15/24    Left lower medial leg (pre)  (Tubigrip)        Assessment:         ICD-10-CM ICD-9-CM   1. Chronic venous insufficiency  I87.2 459.81         Procedures:   mechanical      [] Yes [] No   I & D performed  [] Yes [] No   Excisional debridement performed  [] Yes [] No   Selective debridement performed           [] Yes [] No   Mechanical debridement performed         [] Yes [] No  Silver nitrate applied                           "           [] Yes [] No  Labs ordered this visit                                  [] Yes [] No   Imaging ordered this visit                           [] Yes [] No   Tissue pathology and/or culture taken         MEDICATIONS    Current Outpatient Medications:     LASIX 40 mg tablet, Take 40 mg by mouth., Disp: , Rfl:     metoprolol succinate (TOPROL-XL) 25 MG 24 hr tablet, Take 50 mg by mouth 2 (two) times daily., Disp: , Rfl:     potassium chloride SA (K-DUR,KLOR-CON) 20 MEQ tablet, Take 20 mEq by mouth., Disp: , Rfl:     rosuvastatin (CRESTOR) 20 MG tablet, Take 20 mg by mouth every evening., Disp: , Rfl:     tamsulosin (FLOMAX) 0.4 mg Cap, Take 0.4 mg by mouth., Disp: , Rfl:     XARELTO 20 mg Tab, Take 20 mg by mouth., Disp: , Rfl:     mupirocin (BACTROBAN) 2 % ointment, , Disp: , Rfl:     SSD 1 % cream, , Disp: , Rfl:  Review of patient's allergies indicates:  No Known Allergies    DIAGNOSTICS    Labs/Scans/Micro:    CBC:   Lab Results   Component Value Date    WBC 3.88 (L) 09/11/2023    HGB 12.8 (L) 09/11/2023    HCT 39.7 (L) 09/11/2023    MCV 90.8 09/11/2023     09/11/2023     Sedimentation rate: No results found for: "SEDRATE"     C-reactive protein: No results found for: "CRP"     Chemistry: 3/12/24  Comprehensive Metabolic Panel       Component Ref Range & Units 4 mo ago  (3/12/24)   Sodium 136 - 145 mmol/L 136   Potassium 3.5 - 5.1 mmol/L 4.2   Chloride 98 - 107 mmol/L 100   CO2 23 - 31 mmol/L 27   Glucose 82 - 115 mg/dL 105   Blood Urea Nitrogen 8.4 - 25.7 mg/dL 21.0   Creatinine 0.73 - 1.18 mg/dL 0.82   Calcium 8.8 - 10.0 mg/dL 9.6   Protein Total 5.8 - 7.6 gm/dL 7.4   Albumin 3.4 - 4.8 g/dL 3.8   Globulin 2.4 - 3.5 gm/dL 3.6 High    Albumin/Globulin Ratio 1.1 - 2.0 ratio 1.1   Bilirubin Total <=1.5 mg/dL 0.5   ALP 40 - 150 unit/L 127   ALT 0 - 55 unit/L 13   AST 5 - 34 unit/L 28   eGFR mls/min/1.73/m2 >60   Resulting Agency  Counts include 234 beds at the [facility]        HBA1C: No components found for: "HBA1C"    Ankle Brachial " Index: 7/11/24 -   Right - 1.34  Left - 1.29    Imaging:    Plain film: No results found for this or any previous visit.    MRI: No results found for this or any previous visit.    Bone Scan: No results found for this or any previous visit.    Vascular studies:    7/11/24 -   Arterial:  FINDINGS:  Ultrasound Doppler and color flow imaging were performed on the arteries of the left lower extremity.  A tri phasic flow wave pattern is evident throughout the visualized arteries of the left lower extremity no significant localized atherosclerotic plaque formation or focal stenosis noted to the visualized arteries of the left lower extremity.  The right ankle brachial index is 1.34 and the left is 1.29.  Impression:  1.  No significant localized atherosclerotic plaque formation or focal stenosis noted to the visualized arteries of theleft lower extremity.    Venous:  FINDINGS:  There is normal compression and flow noted in the  common femoral vein, superficial femoral vein, popliteal vein, and  posterior tibial veinbilaterally.  No evidence of deep venous thrombosis is identified. No significant venous reflux is identified.  The left greater saphenous vein has been surgically harvested.  Impression:  1. No deep venous thrombosis identified to the lower extremities bilaterally.    Microbiology:   7/24/24 - Staph Aureus - Doxy Rx sent        HOME HEALTH AGENCY:  none  WOUND CARE ORDERS:  Left medial lower leg wound: Keep clean and dry, wear compression socks daily  Right anterior lower leg wound: Keep clean and dry, wear compression socks daily    Returned to the clinic as needed.

## 2024-10-25 ENCOUNTER — HOSPITAL ENCOUNTER (OUTPATIENT)
Dept: WOUND CARE | Facility: HOSPITAL | Age: 84
Discharge: HOME | End: 2024-10-25
Attending: FAMILY MEDICINE
Payer: MEDICARE

## 2024-10-25 VITALS
BODY MASS INDEX: 32.37 KG/M2 | HEIGHT: 76 IN | WEIGHT: 265.81 LBS | DIASTOLIC BLOOD PRESSURE: 66 MMHG | RESPIRATION RATE: 18 BRPM | HEART RATE: 108 BPM | SYSTOLIC BLOOD PRESSURE: 113 MMHG

## 2024-10-25 DIAGNOSIS — L97.221 VENOUS STASIS ULCER OF LEFT CALF LIMITED TO BREAKDOWN OF SKIN WITHOUT VARICOSE VEINS: ICD-10-CM

## 2024-10-25 DIAGNOSIS — I87.2 VENOUS STASIS ULCER OF LEFT CALF LIMITED TO BREAKDOWN OF SKIN WITHOUT VARICOSE VEINS: ICD-10-CM

## 2024-10-25 DIAGNOSIS — I87.2 CHRONIC VENOUS INSUFFICIENCY: Primary | ICD-10-CM

## 2024-10-25 PROCEDURE — 99213 OFFICE O/P EST LOW 20 MIN: CPT

## 2024-10-25 PROCEDURE — 27000999 HC MEDICAL RECORD PHOTO DOCUMENTATION

## 2024-10-25 RX ORDER — FAMOTIDINE 20 MG/1
20 TABLET, FILM COATED ORAL 2 TIMES DAILY
COMMUNITY
Start: 2024-10-21

## 2024-10-25 NOTE — PROGRESS NOTES
[facility]: None  Smoker   [] Yes   [x] No   Diabetic  [] Yes  [x] No   Vascular Surgeon: Dr. Carter  Vascular procedures [] Yes [x] No   If so, when and what was done?  Recent Ultrasounds [x] Yes [] No   If so, when were they done? (7/29/24 - venous/arterial)  Compression Pumps  [] Yes [x] No       Right ankle - 25.2 cm     Right calf - 45.5 cm     Left ankle - 26 cm     Left calf - 45 cm  Doppler done in office? [x] Yes [] No   Date: 7/10/24     Right dorsalis: monophasic     Right posterior: biphasic     Left dorsalis: biphasic     Left posterior: abnormal  Is the patient eligible for a graft, and/or currently grafting? [] Yes [x] No    If so, which one/size?    NOTES:  Had follow up with Dr. Carter on Monday 10/21/24, he started him on Pepcid. He bumped the left lower anterior leg on a wheelchair a couple of weeks ago.     Subjective:       Patient ID: Piter Farrell is a 84 y.o. male.    Chief Complaint: Wound Check (Left lower leg)    Wound Check    October 25, 2024:  84-year-old white male, who is here for follow up of his chronic venous insufficiency, and development of a new lower leg anterior venous stasis ulcer.  There is also a chronic small eschar on the left calf.  He has been using Tubigrip only.  He only has trace edema in the lower legs.  His right leg reveals no ulcers.    October 18, 2024:  84-year-old white male, who is here for follow up of his chronic bilateral venous insufficiency.  His venous stasis ulcers have now healed.  He will be using his compression stockings at home.  The edema has resolved.    10/15/24 - The patient was seen today for a nurse visit only for an Unna Boot change for the left venous stasis ulcer.  No issues or concerns were brought to this providers attention during this visit.     October 11, 2024:    84-year-old white male, who is here for follow up of chronic venous insufficiency with 2 very small left venous stasis ulcers.  He has been using Unna boots for a few  weeks, only on the left leg.  He uses a Tubigrip on the right leg.    10/7/24 - Patient here for nurse visit - new blister has developed near original two wounds. Resumed use of original zinc unna boot.    10/4/24 - Mr. Farrell returns today for followup on venous ulcers at his left lower extremity.  The initial wound is considerably smaller today, however, since his last visit has had the development a 2nd larger blister.  A mechanical debridement was performed and we will re-apply the Unna boot today.    10/1/24 - The patient was seen today for a nurse visit only for an Unna Boot change for the left venous stasis ulcer.  No issues or concerns were brought to this providers attention during this visit.     September 27, 2024:  84-year-old white male, is here for follow up of the left venous stasis ulcer, chronic venous insufficiency.  He has been using a Tubigrip on the right leg, and Unna boot on the left leg.  The venous stasis ulcer on the left calf is measuring the same as last week, but it is very clean.    9/24/24 - Mr. Farrell is seen today for a nurse visit only for an Unna Boot change for the left venous stasis ulcer.  No issues or concerns were brought to this providers attention during this visit    September 20, 2024:  84-year-old white male, who is here for follow up of the left calf venous stasis ulcer.  It is measuring smaller today.  He has been using an Unna boot.  The maceration around the edges is much improved.    9/17/24 - Mr. Farrell is seen today for a nurse visit only for an Unna Boot change for the left venous stasis ulcer.  No issues or concerns were brought to this providers attention during this visit.     September 13, 2024:  84-year-old white male, who is here for follow up of the left calf venous stasis ulcer.  The measurements are improving, but there still maceration around the edges.  He has been using Polymem.  The edema has greatly improved.  He is also using an Unna boot on the left  leg at this time.    9/10/24 - Mr. Farrell is seen today for a nurse visit only for an Unna Boot change for the left venous stasis ulcer.  No issues or concerns were brought to this providers attention during this visit.     September 6, 2024:  84-year-old white male, who is here for follow up of his left venous stasis ulcer, chronic venous insufficiency.  The venous stasis ulcer is improving, with regard to measurements.  He has been using Polymem.  There still some maceration around the edges.  He is using boot on the left leg twice a week.    9/3/24 - The patient is seen today for a nurse visit only for an Unna Boot change for the left venous stasis ulcer.  No issues or concerns were brought to this providers attention during this visit.     8/30/24 - The patient followup on the venous stasis ulcers at his left lower extremity.  Today, that wound was quite macerated.  He does attempt to elevate as much as possible, however, his son is in the nursing facility and so he does been a lot of time there with the legs in a dependent position.  Today, we will change from silver alginate to Polymem with the hopes that that product may hold the moisture a little better.  He will continue with the Unna boots and twice weekly visits as well.  Of note, compression stockings were ordered on Tuesday 8/27/24, he has not received them yet, we will check on this order. He has blood work and a follow up with Dr. Carter (cardio) in [facility]    8/27/24 - Mr. Farrell is seen today for a nurse visit only for an Unna Boot change for the left venous stasis ulcer.  No issues or concerns were brought to this providers attention during this visit.     August 23, 2024:  84-year-old white male, who is here for follow up of his chronic venous insufficiency, left calf venous stasis ulcer.  He has no open wounds on the right leg.  He will no longer need an Unna boot on the right leg.  The wound on the left calf is improving each week.    8/21/24 -  Patient is seen today for a nurse visit only for Unna boot change.  No issues or complaints were brought to this provider's attention during this visit.     August 16, 2024:  84-year-old white male, who is here for a nurse visit only, to have his Unna boot changed.  There are no new issues.  He has venous stasis ulcers.    August 13, 2024:  84-year-old white male, who is here for follow up of his chronic venous insufficiency, left lower leg venous stasis ulcer.  These wounds are all improving.  The wound on his right lower leg has just about healed.  The edema is much improved with Unna boots.  He does have compression stockings at home, for future needs.      August 9, 2024:  84-year-old white male, who is here for a nurse visit only, to have his dressings changed.  There are no new issues.    August 7, 2024:    84-year-old white male, she of edema in his legs, and some venous stasis ulcers.  The recent venous ultrasound showed good flow in all of his major veins.  There was no DVT.  His CHF appears to be well controlled.  He will an attempt at compression of both legs.  The venous stasis ulcers in his legs are somewhat stalled at this time.  There is a left lower medial venous stasis ulcer, with slough present.  It is slightly painful today.    7/31/24 - Mr. Farrell returns today for the bilateral lower extremity venous ulcers.  He did complete his cardiovascular U/S on 7/29/24.  Both his arterial and venous studies were normal.  He also had a wound culture done on 7/25/24 which shows 100% Staph aureus.  He was prescribed Doxycycline today x 10 days.  We will also be authorizing Dalvance infusion for him in treatment of Staph aureus.  We would like to begin compression with Unna Boots, however, the patient would first like to clear that with his cardiologist.  He does have an adequate EF.  He does have an appt with Dr. Carter (cardio) this afternoon at 2:30 to have a halter monitor placed due to his a-fib. He was  given a copy of his vascular studies to review at that time and will discus compression. Today the wounds on the left leg were selectively debrided.  Santyl has been ordered and we discussed the cost.  If he is unable to obtain he will continue with Mesalt.     7/25/24 - The patient returns today for f/up on his bilateral lower extremity venous ulcers.  The wounds have all improved since his last visit on 7/17/24, however there is some slight erythema at the left lower extremity.  A culture was obtained of the left leg.  He is scheduled for bilateral venous/arterial US on 7/29/24. However, he is concerned about using compression and wound like to discuss it with his cardiologist, Dr. Carter.   Once the U/S are done, if there is any venous reflux noted these will be sent to Dr. Carter for evaluation and consultation with the patient prior to compression.     7/17/24 - Mr. Farrell is here today for followup on his bilateral lower extremity venous ulcers as well as a small wound to the right 2nd toe.  The right 2nd toe was assessed and remains stable.  The scattered wounds at the lower extremities were selectively debrided.  They are beginning to make progress and there are couple of areas were epithelial tissue is beginning to my great from the edges.  He has completed his p.o. Cefzil.  We are anxious to begin compressing the lower extremities, however, we do not have arterial clearing at this time.  He is scheduled for cardiovascular ultrasounds on 07/29/2024 at 10:00 a.m..  We do have venous ultrasounds on file, however, we do not have arterial.  Once arterial ultrasounds or clear we will begin compressing using Unna boots.    7/10/24 - Mr. Farrell is an 84 year old  male who presents with bilateral lower extremity venous ulcers.  His PCP is Dr. Jesus Hernandez, who referred him to wound clinic.    4/16/24: saw PCP for multiple sores on lower extremities, was given Augmentin 875 mg BID and Bumetanide (no culture  was obtained).  4/25/24: saw PCP again for bacterial infection of lower extremities, refilled Augmentin 875 mg BID for 5 days (no culture was obtained)  5/2/24: saw PCP for left leg, was given Silvadine  5/28/24: saw PCP, suggested that he use compression stockings and follow up with his cardiologist (Dr. Carter), he did not do either of these things  7/9/24: saw PCP given Lasix, Cefzil and referred to wound care  He has a history of triple bypass and CHF.   He states that he had venous US done on his legs about 1 month ago with Dr. Carter.  These results were reviewed.  No arterial U/S were performed and the study shows that it was incomplete due to patient resistance.   He is not a diabetic.   He states that he was seen in our clinic about 3 years ago for the same issue.    Today his bilateral lower extremities have multiple open areas that do appear venous in nature.  Because his venous ultrasounds were in conclusive an incomplete we will be ordering full cardiovascular ultrasounds.  He does have 2+ edema as well as hemosiderin staining.  We would like to compress, however, until we have arterial clearing we are hesitant to do so as this has not been addressed.  We did have a lengthy discussion today regarding his congestive heart failure, the need to compress, the impact that compression may have on his CHF due to moving the fluid from his legs to his chest, as well as the need to verify his arterial status prior to compressing.  He is in agreement with all of this and today we will begin covering the open ulcers and applying a Tubigrip over the Kerlix to hold the dressings in place.  He was instructed that he should alternate short elevation of the legs with dependent hanging of the legs so that blood flow can continue to the feet but at the same time tried to begin offloading some of the legs.  He is ambulatory and therefore would be able to wear an Unna boot wound we are at that point.  He does been the  majority of his time at a local skilled nursing facility with his handicapped son.   All 10 toe nails today were quite lengthy and were trimmed for the patient.    Review of Systems   Constitutional: Negative.    Respiratory: Negative.     Cardiovascular: Negative.    Skin:         As documented in the HPI.   All other systems reviewed and are negative.        Objective:        Vitals:    10/25/24 0754   BP: 113/66   Pulse: 108   Resp: 18       Physical Exam  Vitals and nursing note reviewed.   Constitutional:       Appearance: Normal appearance.   Cardiovascular:      Rate and Rhythm: Normal rate.   Pulmonary:      Effort: Pulmonary effort is normal.   Skin:     General: Skin is warm and dry.      Comments: There is minimal edema on the left lower leg, and a small well granulated superficial ulcer on the left lower anterior leg.  I did a mechanical debridement only.  There still some trace edema.   Neurological:      Mental Status: He is alert.            Wound 07/10/24 1154 Venous Ulcer Left lower;medial Leg #1 (Active)   07/10/24 1154   Present on Original Admission: Y   Primary Wound Type: Venous ulcer   Side: Left   Orientation: lower;medial   Location: Leg   Wound Approximate Age at First Assessment (Weeks):    Wound Number: #1   Is this injury device related?:    Incision Type:    Closure Method:    Wound Description (Comments):    Type:    Additional Comments:    Ankle-Brachial Index:    Pulses:    Removal Indication and Assessment:    Wound Outcome:    Dressing Appearance Moist drainage 10/25/24 0758   Drainage Amount Moderate 10/25/24 0758   Drainage Characteristics/Odor Serosanguineous 10/25/24 0758   Appearance Red;Dry 10/25/24 0758   Tissue loss description Full thickness 10/25/24 0758   Periwound Area Dry;Edematous 10/25/24 0758   Wound Edges Open 10/25/24 0758   Wound Length (cm) 0.2 cm 10/25/24 0758   Wound Width (cm) 0.2 cm 10/25/24 0758   Wound Depth (cm) 0.2 cm 10/25/24 0758   Wound Volume (cm^3)  0.008 cm^3 10/25/24 0758   Wound Surface Area (cm^2) 0.04 cm^2 10/25/24 0758   Care Cleansed with:;Wound cleanser 10/25/24 0758   Dressing Applied 10/25/24 0758   Dressing Change Due 10/27/24 10/25/24 0758            Wound 10/25/24 0758 Traumatic Left anterior;lower Leg #2 (Active)   10/25/24 0758   Present on Original Admission: Y   Primary Wound Type: Traumatic   Side: Left   Orientation: anterior;lower   Location: Leg   Wound Approximate Age at First Assessment (Weeks):    Wound Number: #2   Is this injury device related?:    Incision Type:    Closure Method:    Wound Description (Comments):    Type:    Additional Comments:    Ankle-Brachial Index:    Pulses:    Removal Indication and Assessment:    Wound Outcome:    Dressing Appearance Moist drainage 10/25/24 0758   Drainage Amount Moderate 10/25/24 0758   Drainage Characteristics/Odor Serosanguineous 10/25/24 0758   Appearance Red;Moist 10/25/24 0758   Tissue loss description Full thickness 10/25/24 0758   Periwound Area Dry;Edematous 10/25/24 0758   Wound Edges Open 10/25/24 0758   Wound Length (cm) 0.6 cm 10/25/24 0758   Wound Width (cm) 0.4 cm 10/25/24 0758   Wound Depth (cm) 0.2 cm 10/25/24 0758   Wound Volume (cm^3) 0.048 cm^3 10/25/24 0758   Wound Surface Area (cm^2) 0.24 cm^2 10/25/24 0758   Care Cleansed with:;Wound cleanser 10/25/24 0758   Dressing Applied 10/25/24 0758     10/25/24        Left lower anterior leg (pre)                            Left lower medial leg (pre)  (Collagen, island dressing, tubigrip)               (tubigrip)          Assessment:         ICD-10-CM ICD-9-CM   1. Chronic venous insufficiency  I87.2 459.81   2. Venous stasis ulcer of left calf limited to breakdown of skin without varicose veins  I87.2 459.81    L97.221 707.12           Procedures:   mechanical      [] Yes [] No   I & D performed  [] Yes [] No   Excisional debridement performed  [] Yes [] No   Selective debridement performed           [x] Yes [] No   Mechanical  "debridement performed         [] Yes [] No  Silver nitrate applied                                     [] Yes [] No  Labs ordered this visit                                  [] Yes [] No   Imaging ordered this visit                           [] Yes [] No   Tissue pathology and/or culture taken         MEDICATIONS    Current Outpatient Medications:     LASIX 40 mg tablet, Take 40 mg by mouth., Disp: , Rfl:     metoprolol succinate (TOPROL-XL) 25 MG 24 hr tablet, Take 50 mg by mouth 2 (two) times daily., Disp: , Rfl:     PEPCID 20 mg tablet, Take 20 mg by mouth 2 (two) times daily., Disp: , Rfl:     potassium chloride SA (K-DUR,KLOR-CON) 20 MEQ tablet, Take 20 mEq by mouth., Disp: , Rfl:     rosuvastatin (CRESTOR) 20 MG tablet, Take 20 mg by mouth every evening., Disp: , Rfl:     tamsulosin (FLOMAX) 0.4 mg Cap, Take 0.4 mg by mouth., Disp: , Rfl:     XARELTO 20 mg Tab, Take 20 mg by mouth., Disp: , Rfl:     mupirocin (BACTROBAN) 2 % ointment, , Disp: , Rfl:     SSD 1 % cream, , Disp: , Rfl:  Review of patient's allergies indicates:  No Known Allergies    DIAGNOSTICS    Labs/Scans/Micro:    CBC:   Lab Results   Component Value Date    WBC 3.88 (L) 09/11/2023    HGB 12.8 (L) 09/11/2023    HCT 39.7 (L) 09/11/2023    MCV 90.8 09/11/2023     09/11/2023     Sedimentation rate: No results found for: "SEDRATE"     C-reactive protein: No results found for: "CRP"     Chemistry: 3/12/24  Comprehensive Metabolic Panel       Component Ref Range & Units 4 mo ago  (3/12/24)   Sodium 136 - 145 mmol/L 136   Potassium 3.5 - 5.1 mmol/L 4.2   Chloride 98 - 107 mmol/L 100   CO2 23 - 31 mmol/L 27   Glucose 82 - 115 mg/dL 105   Blood Urea Nitrogen 8.4 - 25.7 mg/dL 21.0   Creatinine 0.73 - 1.18 mg/dL 0.82   Calcium 8.8 - 10.0 mg/dL 9.6   Protein Total 5.8 - 7.6 gm/dL 7.4   Albumin 3.4 - 4.8 g/dL 3.8   Globulin 2.4 - 3.5 gm/dL 3.6 High    Albumin/Globulin Ratio 1.1 - 2.0 ratio 1.1   Bilirubin Total <=1.5 mg/dL 0.5   ALP 40 - 150 unit/L " "127   ALT 0 - 55 unit/L 13   AST 5 - 34 unit/L 28   eGFR mls/min/1.73/m2 >60   Resulting Agency  [facility]        HBA1C: No components found for: "HBA1C"    Ankle Brachial Index: 7/11/24 -   Right - 1.34  Left - 1.29    Imaging:    Plain film: No results found for this or any previous visit.    MRI: No results found for this or any previous visit.    Bone Scan: No results found for this or any previous visit.    Vascular studies:    7/11/24 -   Arterial:  FINDINGS:  Ultrasound Doppler and color flow imaging were performed on the arteries of the left lower extremity.  A tri phasic flow wave pattern is evident throughout the visualized arteries of the left lower extremity no significant localized atherosclerotic plaque formation or focal stenosis noted to the visualized arteries of the left lower extremity.  The right ankle brachial index is 1.34 and the left is 1.29.  Impression:  1.  No significant localized atherosclerotic plaque formation or focal stenosis noted to the visualized arteries of theleft lower extremity.    Venous:  FINDINGS:  There is normal compression and flow noted in the  common femoral vein, superficial femoral vein, popliteal vein, and  posterior tibial veinbilaterally.  No evidence of deep venous thrombosis is identified. No significant venous reflux is identified.  The left greater saphenous vein has been surgically harvested.  Impression:  1. No deep venous thrombosis identified to the lower extremities bilaterally.    Microbiology:   7/24/24 - Staph Aureus - Doxy Rx sent        HOME HEALTH AGENCY:  none  WOUND CARE ORDERS:  Left anterior lower leg wound: cleanse with soap and water, apply collagen to wound bed, cover with island dressing, wear tubigrip, change every other day  Right anterior lower leg wound: Keep clean and dry, wear compression socks daily    Returned to the clinic in 2 weeks.           "

## 2024-11-01 ENCOUNTER — LAB VISIT (OUTPATIENT)
Dept: LAB | Facility: HOSPITAL | Age: 84
End: 2024-11-01
Attending: INTERNAL MEDICINE
Payer: MEDICARE

## 2024-11-01 DIAGNOSIS — E78.5 HYPERLIPIDEMIA, UNSPECIFIED HYPERLIPIDEMIA TYPE: Primary | ICD-10-CM

## 2024-11-01 DIAGNOSIS — R06.02 SHORTNESS OF BREATH: ICD-10-CM

## 2024-11-01 DIAGNOSIS — R60.9 EDEMA, UNSPECIFIED TYPE: ICD-10-CM

## 2024-11-01 DIAGNOSIS — I10 ESSENTIAL HYPERTENSION, MALIGNANT: ICD-10-CM

## 2024-11-01 LAB
ALBUMIN SERPL-MCNC: 3.5 G/DL (ref 3.4–4.8)
ALBUMIN/GLOB SERPL: 0.9 RATIO (ref 1.1–2)
ALP SERPL-CCNC: 201 UNIT/L (ref 40–150)
ALT SERPL-CCNC: 29 UNIT/L (ref 0–55)
ANION GAP SERPL CALC-SCNC: 9 MEQ/L
AST SERPL-CCNC: 46 UNIT/L (ref 5–34)
BILIRUB SERPL-MCNC: 0.9 MG/DL
BUN SERPL-MCNC: 30 MG/DL (ref 8.4–25.7)
CALCIUM SERPL-MCNC: 10 MG/DL (ref 8.8–10)
CHLORIDE SERPL-SCNC: 101 MMOL/L (ref 98–107)
CHOLEST SERPL-MCNC: 128 MG/DL
CHOLEST/HDLC SERPL: 2 {RATIO} (ref 0–5)
CO2 SERPL-SCNC: 34 MMOL/L (ref 23–31)
CREAT SERPL-MCNC: 0.95 MG/DL (ref 0.72–1.25)
CREAT/UREA NIT SERPL: 32
GFR SERPLBLD CREATININE-BSD FMLA CKD-EPI: >60 ML/MIN/1.73/M2
GLOBULIN SER-MCNC: 3.7 GM/DL (ref 2.4–3.5)
GLUCOSE SERPL-MCNC: 111 MG/DL (ref 82–115)
HDLC SERPL-MCNC: 52 MG/DL (ref 35–60)
LDLC SERPL CALC-MCNC: 69 MG/DL (ref 50–140)
POTASSIUM SERPL-SCNC: 4.8 MMOL/L (ref 3.5–5.1)
PROT SERPL-MCNC: 7.2 GM/DL (ref 5.8–7.6)
SODIUM SERPL-SCNC: 144 MMOL/L (ref 136–145)
T4 SERPL-MCNC: 8.27 UG/DL (ref 4.87–11.72)
TRIGL SERPL-MCNC: 34 MG/DL (ref 34–140)
TSH SERPL-ACNC: 3.84 UIU/ML (ref 0.35–4.94)
VLDLC SERPL CALC-MCNC: 7 MG/DL

## 2024-11-01 PROCEDURE — 80053 COMPREHEN METABOLIC PANEL: CPT

## 2024-11-01 PROCEDURE — 36415 COLL VENOUS BLD VENIPUNCTURE: CPT

## 2024-11-01 PROCEDURE — 84436 ASSAY OF TOTAL THYROXINE: CPT

## 2024-11-01 PROCEDURE — 84443 ASSAY THYROID STIM HORMONE: CPT

## 2024-11-01 PROCEDURE — 80061 LIPID PANEL: CPT

## 2024-11-08 ENCOUNTER — HOSPITAL ENCOUNTER (OUTPATIENT)
Dept: WOUND CARE | Facility: HOSPITAL | Age: 84
Discharge: HOME | End: 2024-11-08
Attending: FAMILY MEDICINE
Payer: MEDICARE

## 2024-11-08 VITALS
SYSTOLIC BLOOD PRESSURE: 121 MMHG | HEART RATE: 107 BPM | WEIGHT: 265.88 LBS | DIASTOLIC BLOOD PRESSURE: 62 MMHG | RESPIRATION RATE: 18 BRPM | BODY MASS INDEX: 32.38 KG/M2 | HEIGHT: 76 IN

## 2024-11-08 DIAGNOSIS — I87.2 VENOUS STASIS ULCER OF LEFT CALF LIMITED TO BREAKDOWN OF SKIN WITHOUT VARICOSE VEINS: ICD-10-CM

## 2024-11-08 DIAGNOSIS — I87.2 CHRONIC VENOUS INSUFFICIENCY: Primary | ICD-10-CM

## 2024-11-08 DIAGNOSIS — L97.221 VENOUS STASIS ULCER OF LEFT CALF LIMITED TO BREAKDOWN OF SKIN WITHOUT VARICOSE VEINS: ICD-10-CM

## 2024-11-08 PROCEDURE — 29580 STRAPPING UNNA BOOT: CPT

## 2024-11-08 PROCEDURE — 11719 TRIM NAIL(S) ANY NUMBER: CPT

## 2024-11-08 PROCEDURE — 99213 OFFICE O/P EST LOW 20 MIN: CPT

## 2024-11-08 PROCEDURE — 27000999 HC MEDICAL RECORD PHOTO DOCUMENTATION

## 2024-11-08 NOTE — PROGRESS NOTES
[facility]: None  Smoker   [] Yes   [x] No   Diabetic  [] Yes  [x] No   Vascular Surgeon: Dr. Carter  Vascular procedures [] Yes [x] No   If so, when and what was done?  Recent Ultrasounds [x] Yes [] No   If so, when were they done? (7/29/24 - venous/arterial)  Compression Pumps  [] Yes [x] No       Right ankle - 25.5 cm     Right calf - 44 cm     Left ankle - 25.7 cm     Left calf - 45 cm  Doppler done in office? [x] Yes [] No   Date: 7/10/24     Right dorsalis: monophasic     Right posterior: biphasic     Left dorsalis: biphasic     Left posterior: abnormal  Is the patient eligible for a graft, and/or currently grafting? [] Yes [x] No    If so, which one/size?    NOTES:  Here today to have toenails trimmed and for help to put on compression socks.     Subjective:       Patient ID: Piter Farrell is a 84 y.o. male.    Chief Complaint: Venous Ulcer ((Left lower leg))    November 8th, 2024:  84-year-old white male, who is here for follow up of his chronic venous stasis.  He also has very small blisters on the left lower leg.  His left leg is more edematous than the right leg.  He has been trying to use compression stockings, but he has difficulty putting them on and taking them off.  I afraid that the left leg is going to get worse, without good compression.    Wound Check    October 25, 2024:  84-year-old white male, who is here for follow up of his chronic venous insufficiency, and development of a new lower leg anterior venous stasis ulcer.  There is also a chronic small eschar on the left calf.  He has been using Tubigrip only.  He only has trace edema in the lower legs.  His right leg reveals no ulcers.    October 18, 2024:  84-year-old white male, who is here for follow up of his chronic bilateral venous insufficiency.  His venous stasis ulcers have now healed.  He will be using his compression stockings at home.  The edema has resolved.    10/15/24 - The patient was seen today for a nurse visit only for an  Unna Boot change for the left venous stasis ulcer.  No issues or concerns were brought to this providers attention during this visit.     October 11, 2024:    84-year-old white male, who is here for follow up of chronic venous insufficiency with 2 very small left venous stasis ulcers.  He has been using Unna boots for a few weeks, only on the left leg.  He uses a Tubigrip on the right leg.    10/7/24 - Patient here for nurse visit - new blister has developed near original two wounds. Resumed use of original zinc unna boot.    10/4/24 - Mr. Farrell returns today for followup on venous ulcers at his left lower extremity.  The initial wound is considerably smaller today, however, since his last visit has had the development a 2nd larger blister.  A mechanical debridement was performed and we will re-apply the Unna boot today.    10/1/24 - The patient was seen today for a nurse visit only for an Unna Boot change for the left venous stasis ulcer.  No issues or concerns were brought to this providers attention during this visit.     September 27, 2024:  84-year-old white male, is here for follow up of the left venous stasis ulcer, chronic venous insufficiency.  He has been using a Tubigrip on the right leg, and Unna boot on the left leg.  The venous stasis ulcer on the left calf is measuring the same as last week, but it is very clean.    9/24/24 - Mr. Farrell is seen today for a nurse visit only for an Unna Boot change for the left venous stasis ulcer.  No issues or concerns were brought to this providers attention during this visit    September 20, 2024:  84-year-old white male, who is here for follow up of the left calf venous stasis ulcer.  It is measuring smaller today.  He has been using an Unna boot.  The maceration around the edges is much improved.    9/17/24 - Mr. Farrell is seen today for a nurse visit only for an Unna Boot change for the left venous stasis ulcer.  No issues or concerns were brought to this providers  attention during this visit.     September 13, 2024:  84-year-old white male, who is here for follow up of the left calf venous stasis ulcer.  The measurements are improving, but there still maceration around the edges.  He has been using Polymem.  The edema has greatly improved.  He is also using an Unna boot on the left leg at this time.    9/10/24 - Mr. Farrell is seen today for a nurse visit only for an Unna Boot change for the left venous stasis ulcer.  No issues or concerns were brought to this providers attention during this visit.     September 6, 2024:  84-year-old white male, who is here for follow up of his left venous stasis ulcer, chronic venous insufficiency.  The venous stasis ulcer is improving, with regard to measurements.  He has been using Polymem.  There still some maceration around the edges.  He is using boot on the left leg twice a week.    9/3/24 - The patient is seen today for a nurse visit only for an Unna Boot change for the left venous stasis ulcer.  No issues or concerns were brought to this providers attention during this visit.     8/30/24 - The patient followup on the venous stasis ulcers at his left lower extremity.  Today, that wound was quite macerated.  He does attempt to elevate as much as possible, however, his son is in the nursing facility and so he does been a lot of time there with the legs in a dependent position.  Today, we will change from silver alginate to Polymem with the hopes that that product may hold the moisture a little better.  He will continue with the Unna boots and twice weekly visits as well.  Of note, compression stockings were ordered on Tuesday 8/27/24, he has not received them yet, we will check on this order. He has blood work and a follow up with Dr. Carter (cardio) in Novemeber 8/27/24 - Mr. Farrell is seen today for a nurse visit only for an Unna Boot change for the left venous stasis ulcer.  No issues or concerns were brought to this providers  attention during this visit.     August 23, 2024:  84-year-old white male, who is here for follow up of his chronic venous insufficiency, left calf venous stasis ulcer.  He has no open wounds on the right leg.  He will no longer need an Unna boot on the right leg.  The wound on the left calf is improving each week.    8/21/24 - Patient is seen today for a nurse visit only for Unna boot change.  No issues or complaints were brought to this provider's attention during this visit.     August 16, 2024:  84-year-old white male, who is here for a nurse visit only, to have his Unna boot changed.  There are no new issues.  He has venous stasis ulcers.    August 13, 2024:  84-year-old white male, who is here for follow up of his chronic venous insufficiency, left lower leg venous stasis ulcer.  These wounds are all improving.  The wound on his right lower leg has just about healed.  The edema is much improved with Unna boots.  He does have compression stockings at home, for future needs.      August 9, 2024:  84-year-old white male, who is here for a nurse visit only, to have his dressings changed.  There are no new issues.    August 7, 2024:    84-year-old white male, she of edema in his legs, and some venous stasis ulcers.  The recent venous ultrasound showed good flow in all of his major veins.  There was no DVT.  His CHF appears to be well controlled.  He will an attempt at compression of both legs.  The venous stasis ulcers in his legs are somewhat stalled at this time.  There is a left lower medial venous stasis ulcer, with slough present.  It is slightly painful today.    7/31/24 - Mr. Farrell returns today for the bilateral lower extremity venous ulcers.  He did complete his cardiovascular U/S on 7/29/24.  Both his arterial and venous studies were normal.  He also had a wound culture done on 7/25/24 which shows 100% Staph aureus.  He was prescribed Doxycycline today x 10 days.  We will also be authorizing Dalvance  infusion for him in treatment of Staph aureus.  We would like to begin compression with Unna Boots, however, the patient would first like to clear that with his cardiologist.  He does have an adequate EF.  He does have an appt with Dr. Carter (cardio) this afternoon at 2:30 to have a halter monitor placed due to his a-fib. He was given a copy of his vascular studies to review at that time and will discus compression. Today the wounds on the left leg were selectively debrided.  Santyl has been ordered and we discussed the cost.  If he is unable to obtain he will continue with Mesalt.     7/25/24 - The patient returns today for f/up on his bilateral lower extremity venous ulcers.  The wounds have all improved since his last visit on 7/17/24, however there is some slight erythema at the left lower extremity.  A culture was obtained of the left leg.  He is scheduled for bilateral venous/arterial US on 7/29/24. However, he is concerned about using compression and wound like to discuss it with his cardiologist, Dr. Carter.   Once the U/S are done, if there is any venous reflux noted these will be sent to Dr. Carter for evaluation and consultation with the patient prior to compression.     7/17/24 - Mr. Farrell is here today for followup on his bilateral lower extremity venous ulcers as well as a small wound to the right 2nd toe.  The right 2nd toe was assessed and remains stable.  The scattered wounds at the lower extremities were selectively debrided.  They are beginning to make progress and there are couple of areas were epithelial tissue is beginning to my great from the edges.  He has completed his p.o. Cefzil.  We are anxious to begin compressing the lower extremities, however, we do not have arterial clearing at this time.  He is scheduled for cardiovascular ultrasounds on 07/29/2024 at 10:00 a.m..  We do have venous ultrasounds on file, however, we do not have arterial.  Once arterial ultrasounds or clear we will  begin compressing using Unna boots.    7/10/24 - Mr. Farrell is an 84 year old  male who presents with bilateral lower extremity venous ulcers.  His PCP is Dr. Jesus Hernandez, who referred him to wound clinic.    4/16/24: saw PCP for multiple sores on lower extremities, was given Augmentin 875 mg BID and Bumetanide (no culture was obtained).  4/25/24: saw PCP again for bacterial infection of lower extremities, refilled Augmentin 875 mg BID for 5 days (no culture was obtained)  5/2/24: saw PCP for left leg, was given Silvadine  5/28/24: saw PCP, suggested that he use compression stockings and follow up with his cardiologist (Dr. Carter), he did not do either of these things  7/9/24: saw PCP given Lasix, Cefzil and referred to wound care  He has a history of triple bypass and CHF.   He states that he had venous US done on his legs about 1 month ago with Dr. Carter.  These results were reviewed.  No arterial U/S were performed and the study shows that it was incomplete due to patient resistance.   He is not a diabetic.   He states that he was seen in our clinic about 3 years ago for the same issue.    Today his bilateral lower extremities have multiple open areas that do appear venous in nature.  Because his venous ultrasounds were in conclusive an incomplete we will be ordering full cardiovascular ultrasounds.  He does have 2+ edema as well as hemosiderin staining.  We would like to compress, however, until we have arterial clearing we are hesitant to do so as this has not been addressed.  We did have a lengthy discussion today regarding his congestive heart failure, the need to compress, the impact that compression may have on his CHF due to moving the fluid from his legs to his chest, as well as the need to verify his arterial status prior to compressing.  He is in agreement with all of this and today we will begin covering the open ulcers and applying a Tubigrip over the Kerlix to hold the dressings in place.   He was instructed that he should alternate short elevation of the legs with dependent hanging of the legs so that blood flow can continue to the feet but at the same time tried to begin offloading some of the legs.  He is ambulatory and therefore would be able to wear an Unna boot wound we are at that point.  He does been the majority of his time at a local skilled nursing facility with his handicapped son.   All 10 toe nails today were quite lengthy and were trimmed for the patient.    Review of Systems   Constitutional: Negative.    Respiratory: Negative.     Cardiovascular: Negative.    Skin:         As documented in the HPI.   All other systems reviewed and are negative.        Objective:        Vitals:    11/08/24 0749   BP: 121/62   Pulse: 107   Resp: 18       Physical Exam  Vitals and nursing note reviewed.   Constitutional:       Appearance: Normal appearance.   Cardiovascular:      Rate and Rhythm: Normal rate.   Pulmonary:      Effort: Pulmonary effort is normal.   Skin:     General: Skin is warm and dry.      Comments: The right lower leg reveals trace edema, without obvious stasis ulcers at this time.  The left lower leg is more swollen, 2+ pretibial edema, with some early blister formation in the posterior calf.  He has a very small superficial ulceration to the left lower anterior leg.  In addition, I trimmed his 10 toenails, without difficulty.   Neurological:      Mental Status: He is alert.            Wound 10/25/24 0758 Traumatic Left anterior;lower Leg #2 (Active)   10/25/24 0758 Leg   Present on Original Admission: Y   Primary Wound Type: Traumatic   Side: Left   Orientation: anterior;lower   Wound Approximate Age at First Assessment (Weeks):    Wound Number: #2   Is this injury device related?:    Incision Type:    Closure Method:    Wound Description (Comments):    Type:    Additional Comments:    Ankle-Brachial Index:    Pulses:    Removal Indication and Assessment:    Wound Outcome:     Dressing Appearance Dry;Intact;Clean 11/08/24 0748   Drainage Amount None 11/08/24 0748   Appearance Red;Dry 11/08/24 0748   Periwound Area Dry;Edematous 11/08/24 0748   Wound Length (cm) 0.1 cm 11/08/24 0748   Wound Width (cm) 0.1 cm 11/08/24 0748   Wound Depth (cm) 0.1 cm 11/08/24 0748   Wound Volume (cm^3) 0.001 cm^3 11/08/24 0748   Wound Surface Area (cm^2) 0.01 cm^2 11/08/24 0748   Care Cleansed with:;Wound cleanser 11/08/24 0748   Dressing Applied 11/08/24 0748   Dressing Change Due 11/12/24 11/08/24 0748            Wound 11/08/24 0801 Venous Ulcer Left lower;posterior Leg #1 (Active)   11/08/24 0801 Leg   Present on Original Admission: Y   Primary Wound Type: Venous ulcer   Side: Left   Orientation: lower;posterior   Wound Approximate Age at First Assessment (Weeks):    Wound Number: #1   Is this injury device related?:    Incision Type:    Closure Method:    Wound Description (Comments):    Type:    Additional Comments:    Ankle-Brachial Index:    Pulses:    Removal Indication and Assessment:    Wound Outcome:    Dressing Appearance Moist drainage 11/08/24 0748   Drainage Amount Moderate 11/08/24 0748   Drainage Characteristics/Odor Sanguineous 11/08/24 0748   Appearance Pink;Blistered;Moist 11/08/24 0748   Tissue loss description Full thickness 11/08/24 0748   Periwound Area Dry;Edematous 11/08/24 0748   Wound Edges Open 11/08/24 0748   Wound Length (cm) 0.5 cm 11/08/24 0748   Wound Width (cm) 0.5 cm 11/08/24 0748   Wound Depth (cm) 0.2 cm 11/08/24 0748   Wound Volume (cm^3) 0.05 cm^3 11/08/24 0748   Wound Surface Area (cm^2) 0.25 cm^2 11/08/24 0748   Care Cleansed with:;Wound cleanser 11/08/24 0748   Dressing Applied 11/08/24 0748   Dressing Change Due 11/12/24 11/08/24 0748       [REMOVED]      Wound 07/10/24 1154 Venous Ulcer Left lower;medial Leg #1 (Removed)   07/10/24 1154 Leg   Present on Original Admission: Y   Primary Wound Type: Venous ulcer   Side: Left   Orientation: lower;medial   Wound  Approximate Age at First Assessment (Weeks):    Wound Number: #1   Is this injury device related?:    Incision Type:    Closure Method:    Wound Description (Comments):    Type:    Additional Comments:    Ankle-Brachial Index:    Pulses:    Removal Indication and Assessment:    Wound Outcome: Healed   Removed 11/08/24 0749   Dressing Appearance Open to air 11/08/24 0748   Drainage Amount None 11/08/24 0748   Appearance Pink;Dry 11/08/24 0748   Periwound Area Dry;Edematous 11/08/24 0748   Wound Length (cm) 0 cm 11/08/24 0748   Wound Width (cm) 0 cm 11/08/24 0748   Wound Depth (cm) 0 cm 11/08/24 0748   Wound Volume (cm^3) 0 cm^3 11/08/24 0748   Wound Surface Area (cm^2) 0 cm^2 11/08/24 0748     10/25/24        Left lower anterior leg (pre)                            Left lower medial leg (pre)  (Collagen, island dressing, tubigrip)               (tubigrip)    11/8/24        Left lower anterior leg (pre)                            Left lower posterior leg (pre)  (Zinc unna boot, kerlix, coban)        Assessment:         ICD-10-CM ICD-9-CM   1. Chronic venous insufficiency  I87.2 459.81   2. Venous stasis ulcer of left calf limited to breakdown of skin without varicose veins  I87.2 459.81    L97.221 707.12             Procedures:   Mechanical  Nail trimming      [] Yes [] No   I & D performed  [] Yes [] No   Excisional debridement performed  [] Yes [] No   Selective debridement performed           [x] Yes [] No   Mechanical debridement performed         [] Yes [] No  Silver nitrate applied                                     [] Yes [] No  Labs ordered this visit                                  [] Yes [] No   Imaging ordered this visit                           [] Yes [] No   Tissue pathology and/or culture taken         MEDICATIONS    Current Outpatient Medications:     LASIX 40 mg tablet, Take 40 mg by mouth., Disp: , Rfl:     metoprolol succinate (TOPROL-XL) 25 MG 24 hr tablet, Take 50 mg by mouth 2 (two) times  "daily., Disp: , Rfl:     PEPCID 20 mg tablet, Take 20 mg by mouth 2 (two) times daily., Disp: , Rfl:     potassium chloride SA (K-DUR,KLOR-CON) 20 MEQ tablet, Take 20 mEq by mouth., Disp: , Rfl:     rosuvastatin (CRESTOR) 20 MG tablet, Take 20 mg by mouth every evening., Disp: , Rfl:     tamsulosin (FLOMAX) 0.4 mg Cap, Take 0.4 mg by mouth., Disp: , Rfl:     XARELTO 20 mg Tab, Take 20 mg by mouth., Disp: , Rfl:     mupirocin (BACTROBAN) 2 % ointment, , Disp: , Rfl:     SSD 1 % cream, , Disp: , Rfl:  Review of patient's allergies indicates:  No Known Allergies    DIAGNOSTICS    Labs/Scans/Micro:    CBC:   Lab Results   Component Value Date    WBC 3.88 (L) 09/11/2023    HGB 12.8 (L) 09/11/2023    HCT 39.7 (L) 09/11/2023    MCV 90.8 09/11/2023     09/11/2023     Sedimentation rate: No results found for: "SEDRATE"     C-reactive protein: No results found for: "CRP"     Chemistry: 3/12/24  Comprehensive Metabolic Panel       Component Ref Range & Units 4 mo ago  (3/12/24)   Sodium 136 - 145 mmol/L 136   Potassium 3.5 - 5.1 mmol/L 4.2   Chloride 98 - 107 mmol/L 100   CO2 23 - 31 mmol/L 27   Glucose 82 - 115 mg/dL 105   Blood Urea Nitrogen 8.4 - 25.7 mg/dL 21.0   Creatinine 0.73 - 1.18 mg/dL 0.82   Calcium 8.8 - 10.0 mg/dL 9.6   Protein Total 5.8 - 7.6 gm/dL 7.4   Albumin 3.4 - 4.8 g/dL 3.8   Globulin 2.4 - 3.5 gm/dL 3.6 High    Albumin/Globulin Ratio 1.1 - 2.0 ratio 1.1   Bilirubin Total <=1.5 mg/dL 0.5   ALP 40 - 150 unit/L 127   ALT 0 - 55 unit/L 13   AST 5 - 34 unit/L 28   eGFR mls/min/1.73/m2 >60   Resulting Agency  [facility]        HBA1C: No components found for: "HBA1C"    Ankle Brachial Index: 7/11/24 -   Right - 1.34  Left - 1.29    Imaging:    Plain film: No results found for this or any previous visit.    MRI: No results found for this or any previous visit.    Bone Scan: No results found for this or any previous visit.    Vascular studies:    7/11/24 -   Arterial:  FINDINGS:  Ultrasound Doppler and color " flow imaging were performed on the arteries of the left lower extremity.  A tri phasic flow wave pattern is evident throughout the visualized arteries of the left lower extremity no significant localized atherosclerotic plaque formation or focal stenosis noted to the visualized arteries of the left lower extremity.  The right ankle brachial index is 1.34 and the left is 1.29.  Impression:  1.  No significant localized atherosclerotic plaque formation or focal stenosis noted to the visualized arteries of theleft lower extremity.    Venous:  FINDINGS:  There is normal compression and flow noted in the  common femoral vein, superficial femoral vein, popliteal vein, and  posterior tibial veinbilaterally.  No evidence of deep venous thrombosis is identified. No significant venous reflux is identified.  The left greater saphenous vein has been surgically harvested.  Impression:  1. No deep venous thrombosis identified to the lower extremities bilaterally.    Microbiology:   7/24/24 - Staph Aureus - Doxy Rx sent        HOME HEALTH AGENCY:  none  WOUND CARE ORDERS:  Left anterior lower leg wound: cleanse with wound cleanser, apply zinc unna boot, kerlix, and coban, change on Tuesdays and Fridays  Right anterior lower leg wound: Keep clean and dry, wear compression socks daily

## 2024-11-12 ENCOUNTER — HOSPITAL ENCOUNTER (OUTPATIENT)
Dept: WOUND CARE | Facility: HOSPITAL | Age: 84
Discharge: HOME | End: 2024-11-12
Attending: FAMILY MEDICINE
Payer: MEDICARE

## 2024-11-12 VITALS
HEIGHT: 76 IN | BODY MASS INDEX: 32.38 KG/M2 | RESPIRATION RATE: 18 BRPM | SYSTOLIC BLOOD PRESSURE: 126 MMHG | DIASTOLIC BLOOD PRESSURE: 75 MMHG | WEIGHT: 265.88 LBS | HEART RATE: 91 BPM

## 2024-11-12 DIAGNOSIS — I87.2 CHRONIC VENOUS INSUFFICIENCY: Primary | ICD-10-CM

## 2024-11-12 DIAGNOSIS — I87.2 VENOUS STASIS ULCER OF LEFT CALF LIMITED TO BREAKDOWN OF SKIN WITHOUT VARICOSE VEINS: ICD-10-CM

## 2024-11-12 DIAGNOSIS — L97.221 VENOUS STASIS ULCER OF LEFT CALF LIMITED TO BREAKDOWN OF SKIN WITHOUT VARICOSE VEINS: ICD-10-CM

## 2024-11-12 PROCEDURE — 99213 OFFICE O/P EST LOW 20 MIN: CPT

## 2024-11-12 PROCEDURE — 29580 STRAPPING UNNA BOOT: CPT

## 2024-11-12 PROCEDURE — 27000999 HC MEDICAL RECORD PHOTO DOCUMENTATION

## 2024-11-12 NOTE — PROGRESS NOTES
[facility]  Smoker   [] Yes   [x] No   Diabetic  [] Yes  [x] No   Vascular Surgeon: Dr. Carter  Vascular procedures [] Yes [x] No   If so, when and what was done?  Recent Ultrasounds [x] Yes [] No   If so, when were they done? (7/29/24 - venous/arterial)  Compression Pumps  [] Yes [x] No       Right ankle - 26.2cm     Right calf - 45.4 cm     Left ankle - 25.4 cm     Left calf - 43.2 cm  Doppler done in office? [x] Yes [] No   Date: 7/10/24     Right dorsalis: monophasic     Right posterior: biphasic     Left dorsalis: biphasic     Left posterior: abnormal  Is the patient eligible for a graft, and/or currently grafting? [] Yes [x] No    If so, which one/size?      Subjective:       Patient ID: Piter Farrell is a 84 y.o. male.    Chief Complaint: Venous Ulcer ((Left lower leg))    11/12/4 - The patient was seen today for a nurse visit only for an Unna Boot change for the left venous stasis ulcer.  No issues or concerns were brought to this providers attention during this visit.     November 8th, 2024:  84-year-old white male, who is here for follow up of his chronic venous stasis.  He also has very small blisters on the left lower leg.  His left leg is more edematous than the right leg.  He has been trying to use compression stockings, but he has difficulty putting them on and taking them off.  I afraid that the left leg is going to get worse, without good compression.    October 25, 2024:  84-year-old white male, who is here for follow up of his chronic venous insufficiency, and development of a new lower leg anterior venous stasis ulcer.  There is also a chronic small eschar on the left calf.  He has been using Tubigrip only.  He only has trace edema in the lower legs.  His right leg reveals no ulcers.    October 18, 2024:  84-year-old white male, who is here for follow up of his chronic bilateral venous insufficiency.  His venous stasis ulcers have now healed.  He will be using his compression stockings at  home.  The edema has resolved.    10/15/24 - The patient was seen today for a nurse visit only for an Unna Boot change for the left venous stasis ulcer.  No issues or concerns were brought to this providers attention during this visit.     October 11, 2024:    84-year-old white male, who is here for follow up of chronic venous insufficiency with 2 very small left venous stasis ulcers.  He has been using Unna boots for a few weeks, only on the left leg.  He uses a Tubigrip on the right leg.    10/7/24 - Patient here for nurse visit - new blister has developed near original two wounds. Resumed use of original zinc unna boot.    10/4/24 - Mr. Farrell returns today for followup on venous ulcers at his left lower extremity.  The initial wound is considerably smaller today, however, since his last visit has had the development a 2nd larger blister.  A mechanical debridement was performed and we will re-apply the Unna boot today.    10/1/24 - The patient was seen today for a nurse visit only for an Unna Boot change for the left venous stasis ulcer.  No issues or concerns were brought to this providers attention during this visit.     September 27, 2024:  84-year-old white male, is here for follow up of the left venous stasis ulcer, chronic venous insufficiency.  He has been using a Tubigrip on the right leg, and Unna boot on the left leg.  The venous stasis ulcer on the left calf is measuring the same as last week, but it is very clean.    9/24/24 - Mr. Farrell is seen today for a nurse visit only for an Unna Boot change for the left venous stasis ulcer.  No issues or concerns were brought to this providers attention during this visit    September 20, 2024:  84-year-old white male, who is here for follow up of the left calf venous stasis ulcer.  It is measuring smaller today.  He has been using an Unna boot.  The maceration around the edges is much improved.    9/17/24 - Mr. Farrell is seen today for a nurse visit only for an  Unna Boot change for the left venous stasis ulcer.  No issues or concerns were brought to this providers attention during this visit.     September 13, 2024:  84-year-old white male, who is here for follow up of the left calf venous stasis ulcer.  The measurements are improving, but there still maceration around the edges.  He has been using Polymem.  The edema has greatly improved.  He is also using an Unna boot on the left leg at this time.    9/10/24 - Mr. Farrell is seen today for a nurse visit only for an Unna Boot change for the left venous stasis ulcer.  No issues or concerns were brought to this providers attention during this visit.     September 6, 2024:  84-year-old white male, who is here for follow up of his left venous stasis ulcer, chronic venous insufficiency.  The venous stasis ulcer is improving, with regard to measurements.  He has been using Polymem.  There still some maceration around the edges.  He is using boot on the left leg twice a week.    9/3/24 - The patient is seen today for a nurse visit only for an Unna Boot change for the left venous stasis ulcer.  No issues or concerns were brought to this providers attention during this visit.     8/30/24 - The patient followup on the venous stasis ulcers at his left lower extremity.  Today, that wound was quite macerated.  He does attempt to elevate as much as possible, however, his son is in the nursing facility and so he does been a lot of time there with the legs in a dependent position.  Today, we will change from silver alginate to Polymem with the hopes that that product may hold the moisture a little better.  He will continue with the Unna boots and twice weekly visits as well.  Of note, compression stockings were ordered on Tuesday 8/27/24, he has not received them yet, we will check on this order. He has blood work and a follow up with Dr. Carter (cardio) in Novemeber 8/27/24 - Mr. Farrell is seen today for a nurse visit only for an Unna  Boot change for the left venous stasis ulcer.  No issues or concerns were brought to this providers attention during this visit.     August 23, 2024:  84-year-old white male, who is here for follow up of his chronic venous insufficiency, left calf venous stasis ulcer.  He has no open wounds on the right leg.  He will no longer need an Unna boot on the right leg.  The wound on the left calf is improving each week.    8/21/24 - Patient is seen today for a nurse visit only for Unna boot change.  No issues or complaints were brought to this provider's attention during this visit.     August 16, 2024:  84-year-old white male, who is here for a nurse visit only, to have his Unna boot changed.  There are no new issues.  He has venous stasis ulcers.    August 13, 2024:  84-year-old white male, who is here for follow up of his chronic venous insufficiency, left lower leg venous stasis ulcer.  These wounds are all improving.  The wound on his right lower leg has just about healed.  The edema is much improved with Unna boots.  He does have compression stockings at home, for future needs.      August 9, 2024:  84-year-old white male, who is here for a nurse visit only, to have his dressings changed.  There are no new issues.    August 7, 2024:    84-year-old white male, she of edema in his legs, and some venous stasis ulcers.  The recent venous ultrasound showed good flow in all of his major veins.  There was no DVT.  His CHF appears to be well controlled.  He will an attempt at compression of both legs.  The venous stasis ulcers in his legs are somewhat stalled at this time.  There is a left lower medial venous stasis ulcer, with slough present.  It is slightly painful today.    7/31/24 - Mr. Farrell returns today for the bilateral lower extremity venous ulcers.  He did complete his cardiovascular U/S on 7/29/24.  Both his arterial and venous studies were normal.  He also had a wound culture done on 7/25/24 which shows 100%  Staph aureus.  He was prescribed Doxycycline today x 10 days.  We will also be authorizing Dalvance infusion for him in treatment of Staph aureus.  We would like to begin compression with Unna Boots, however, the patient would first like to clear that with his cardiologist.  He does have an adequate EF.  He does have an appt with Dr. Carter (cardio) this afternoon at 2:30 to have a halter monitor placed due to his a-fib. He was given a copy of his vascular studies to review at that time and will discus compression. Today the wounds on the left leg were selectively debrided.  Santyl has been ordered and we discussed the cost.  If he is unable to obtain he will continue with Mesalt.     7/25/24 - The patient returns today for f/up on his bilateral lower extremity venous ulcers.  The wounds have all improved since his last visit on 7/17/24, however there is some slight erythema at the left lower extremity.  A culture was obtained of the left leg.  He is scheduled for bilateral venous/arterial US on 7/29/24. However, he is concerned about using compression and wound like to discuss it with his cardiologist, Dr. Carter.   Once the U/S are done, if there is any venous reflux noted these will be sent to Dr. Carter for evaluation and consultation with the patient prior to compression.     7/17/24 - Mr. Farrell is here today for followup on his bilateral lower extremity venous ulcers as well as a small wound to the right 2nd toe.  The right 2nd toe was assessed and remains stable.  The scattered wounds at the lower extremities were selectively debrided.  They are beginning to make progress and there are couple of areas were epithelial tissue is beginning to my great from the edges.  He has completed his p.o. Cefzil.  We are anxious to begin compressing the lower extremities, however, we do not have arterial clearing at this time.  He is scheduled for cardiovascular ultrasounds on 07/29/2024 at 10:00 a.m..  We do have venous  ultrasounds on file, however, we do not have arterial.  Once arterial ultrasounds or clear we will begin compressing using Unna boots.    7/10/24 - Mr. Farrell is an 84 year old  male who presents with bilateral lower extremity venous ulcers.  His PCP is Dr. Jesus Hernandez, who referred him to wound clinic.    4/16/24: saw PCP for multiple sores on lower extremities, was given Augmentin 875 mg BID and Bumetanide (no culture was obtained).  4/25/24: saw PCP again for bacterial infection of lower extremities, refilled Augmentin 875 mg BID for 5 days (no culture was obtained)  5/2/24: saw PCP for left leg, was given Silvadine  5/28/24: saw PCP, suggested that he use compression stockings and follow up with his cardiologist (Dr. Carter), he did not do either of these things  7/9/24: saw PCP given Lasix, Cefzil and referred to wound care  He has a history of triple bypass and CHF.   He states that he had venous US done on his legs about 1 month ago with Dr. Carter.  These results were reviewed.  No arterial U/S were performed and the study shows that it was incomplete due to patient resistance.   He is not a diabetic.   He states that he was seen in our clinic about 3 years ago for the same issue.    Today his bilateral lower extremities have multiple open areas that do appear venous in nature.  Because his venous ultrasounds were in conclusive an incomplete we will be ordering full cardiovascular ultrasounds.  He does have 2+ edema as well as hemosiderin staining.  We would like to compress, however, until we have arterial clearing we are hesitant to do so as this has not been addressed.  We did have a lengthy discussion today regarding his congestive heart failure, the need to compress, the impact that compression may have on his CHF due to moving the fluid from his legs to his chest, as well as the need to verify his arterial status prior to compressing.  He is in agreement with all of this and today we will  begin covering the open ulcers and applying a Tubigrip over the Kerlix to hold the dressings in place.  He was instructed that he should alternate short elevation of the legs with dependent hanging of the legs so that blood flow can continue to the feet but at the same time tried to begin offloading some of the legs.  He is ambulatory and therefore would be able to wear an Unna boot wound we are at that point.  He does been the majority of his time at a local skilled nursing facility with his handicapped son.   All 10 toe nails today were quite lengthy and were trimmed for the patient.    Review of Systems   Constitutional: Negative.    Respiratory: Negative.     Cardiovascular: Negative.    Skin:         As documented in the HPI.   All other systems reviewed and are negative.        Objective:        Vitals:    11/12/24 0753   BP: 126/75   Pulse: 91   Resp: 18       Physical Exam  Vitals and nursing note reviewed.   Constitutional:       Appearance: Normal appearance.   Cardiovascular:      Rate and Rhythm: Normal rate.   Pulmonary:      Effort: Pulmonary effort is normal.   Skin:     General: Skin is warm and dry.      Comments: The right lower leg reveals trace edema, without obvious stasis ulcers at this time.  The left lower leg is more swollen, 2+ pretibial edema, with some early blister formation in the posterior calf.  He has a very small superficial ulceration to the left lower anterior leg.  In addition, I trimmed his 10 toenails, without difficulty.   Neurological:      Mental Status: He is alert.            Wound 10/25/24 0758 Traumatic Left anterior;lower Leg #2 (Active)   10/25/24 0758 Leg   Present on Original Admission: Y   Primary Wound Type: Traumatic   Side: Left   Orientation: anterior;lower   Wound Approximate Age at First Assessment (Weeks):    Wound Number: #2   Is this injury device related?:    Incision Type:    Closure Method:    Wound Description (Comments):    Type:    Additional  Comments:    Ankle-Brachial Index:    Pulses:    Removal Indication and Assessment:    Wound Outcome:    Dressing Appearance Moist drainage 11/12/24 0754   Drainage Amount Moderate 11/12/24 0754   Drainage Characteristics/Odor Serosanguineous 11/12/24 0754   Appearance Red;Dry 11/12/24 0754   Tissue loss description Full thickness 11/12/24 0754   Periwound Area Dry;Edematous 11/12/24 0754   Wound Edges Defined 11/12/24 0754   Wound Length (cm) 0.1 cm 11/12/24 0754   Wound Width (cm) 0.1 cm 11/12/24 0754   Wound Depth (cm) 0.1 cm 11/12/24 0754   Wound Volume (cm^3) 0.001 cm^3 11/12/24 0754   Wound Surface Area (cm^2) 0.01 cm^2 11/12/24 0754   Care Cleansed with:;Wound cleanser 11/12/24 0754   Dressing Applied 11/12/24 0754   Dressing Change Due 11/15/24 11/12/24 0754            Wound 11/08/24 0801 Venous Ulcer Left lower;posterior Leg #1 (Active)   11/08/24 0801 Leg   Present on Original Admission: Y   Primary Wound Type: Venous ulcer   Side: Left   Orientation: lower;posterior   Wound Approximate Age at First Assessment (Weeks):    Wound Number: #1   Is this injury device related?:    Incision Type:    Closure Method:    Wound Description (Comments):    Type:    Additional Comments:    Ankle-Brachial Index:    Pulses:    Removal Indication and Assessment:    Wound Outcome:    Dressing Appearance Moist drainage 11/12/24 0754   Drainage Amount Moderate 11/12/24 0754   Drainage Characteristics/Odor Serosanguineous 11/12/24 0754   Appearance Blistered 11/12/24 0754   Tissue loss description Full thickness 11/12/24 0754   Periwound Area Dry;Edematous 11/12/24 0754   Wound Edges Defined 11/12/24 0754   Wound Length (cm) 6 cm 11/12/24 0754   Wound Width (cm) 6 cm 11/12/24 0754   Wound Depth (cm) 0.1 cm 11/12/24 0754   Wound Volume (cm^3) 3.6 cm^3 11/12/24 0754   Wound Surface Area (cm^2) 36 cm^2 11/12/24 0754   Care Cleansed with:;Wound cleanser 11/12/24 0754   Dressing Applied 11/12/24 0754   Dressing Change Due 11/15/24  "11/12/24 0754     11/8/24        Left lower anterior leg (pre)                            Left lower posterior leg (pre)  (Zinc unna boot, kerlix, coban)    11/12/24        Left lower anterior leg (pre)                           Left lower posterior leg (pre)  (Zinc unna boot, kerlix, coban)        Assessment:       No diagnosis found.            Procedures:   No procedure      [] Yes [] No   I & D performed  [] Yes [] No   Excisional debridement performed  [] Yes [] No   Selective debridement performed           [] Yes [] No   Mechanical debridement performed         [] Yes [] No  Silver nitrate applied                                     [] Yes [] No  Labs ordered this visit                                  [] Yes [] No   Imaging ordered this visit                           [] Yes [] No   Tissue pathology and/or culture taken         MEDICATIONS    Current Outpatient Medications:     LASIX 40 mg tablet, Take 40 mg by mouth., Disp: , Rfl:     metoprolol succinate (TOPROL-XL) 25 MG 24 hr tablet, Take 50 mg by mouth 2 (two) times daily., Disp: , Rfl:     PEPCID 20 mg tablet, Take 20 mg by mouth 2 (two) times daily., Disp: , Rfl:     potassium chloride SA (K-DUR,KLOR-CON) 20 MEQ tablet, Take 20 mEq by mouth., Disp: , Rfl:     rosuvastatin (CRESTOR) 20 MG tablet, Take 20 mg by mouth every evening., Disp: , Rfl:     tamsulosin (FLOMAX) 0.4 mg Cap, Take 0.4 mg by mouth., Disp: , Rfl:     XARELTO 20 mg Tab, Take 20 mg by mouth., Disp: , Rfl:     mupirocin (BACTROBAN) 2 % ointment, , Disp: , Rfl:     SSD 1 % cream, , Disp: , Rfl:  Review of patient's allergies indicates:  No Known Allergies    DIAGNOSTICS    Labs/Scans/Micro:    CBC:   Lab Results   Component Value Date    WBC 3.88 (L) 09/11/2023    HGB 12.8 (L) 09/11/2023    HCT 39.7 (L) 09/11/2023    MCV 90.8 09/11/2023     09/11/2023     Sedimentation rate: No results found for: "SEDRATE"     C-reactive protein: No results found for: "CRP"     Chemistry: " "3/12/24  Comprehensive Metabolic Panel       Component Ref Range & Units 4 mo ago  (3/12/24)   Sodium 136 - 145 mmol/L 136   Potassium 3.5 - 5.1 mmol/L 4.2   Chloride 98 - 107 mmol/L 100   CO2 23 - 31 mmol/L 27   Glucose 82 - 115 mg/dL 105   Blood Urea Nitrogen 8.4 - 25.7 mg/dL 21.0   Creatinine 0.73 - 1.18 mg/dL 0.82   Calcium 8.8 - 10.0 mg/dL 9.6   Protein Total 5.8 - 7.6 gm/dL 7.4   Albumin 3.4 - 4.8 g/dL 3.8   Globulin 2.4 - 3.5 gm/dL 3.6 High    Albumin/Globulin Ratio 1.1 - 2.0 ratio 1.1   Bilirubin Total <=1.5 mg/dL 0.5   ALP 40 - 150 unit/L 127   ALT 0 - 55 unit/L 13   AST 5 - 34 unit/L 28   eGFR mls/min/1.73/m2 >60   Resulting Agency  [facility]        HBA1C: No components found for: "HBA1C"    Ankle Brachial Index: 7/11/24 -   Right - 1.34  Left - 1.29    Imaging:    Plain film: No results found for this or any previous visit.    MRI: No results found for this or any previous visit.    Bone Scan: No results found for this or any previous visit.    Vascular studies:    7/11/24 -   Arterial:  FINDINGS:  Ultrasound Doppler and color flow imaging were performed on the arteries of the left lower extremity.  A tri phasic flow wave pattern is evident throughout the visualized arteries of the left lower extremity no significant localized atherosclerotic plaque formation or focal stenosis noted to the visualized arteries of the left lower extremity.  The right ankle brachial index is 1.34 and the left is 1.29.  Impression:  1.  No significant localized atherosclerotic plaque formation or focal stenosis noted to the visualized arteries of theleft lower extremity.    Venous:  FINDINGS:  There is normal compression and flow noted in the  common femoral vein, superficial femoral vein, popliteal vein, and  posterior tibial veinbilaterally.  No evidence of deep venous thrombosis is identified. No significant venous reflux is identified.  The left greater saphenous vein has been surgically harvested.  Impression:  1. No deep " venous thrombosis identified to the lower extremities bilaterally.    Microbiology:   7/24/24 - Staph Aureus - Doxy Rx sent        HOME HEALTH AGENCY:  none  WOUND CARE ORDERS:  Left anterior lower leg wound: cleanse with wound cleanser, apply zinc unna boot, kerlix, and coban, change on Tuesdays and Fridays  Right anterior lower leg wound: Keep clean and dry, wear compression socks daily

## 2024-11-13 ENCOUNTER — HOSPITAL ENCOUNTER (EMERGENCY)
Facility: HOSPITAL | Age: 84
Discharge: HOME | End: 2024-11-13
Attending: STUDENT IN AN ORGANIZED HEALTH CARE EDUCATION/TRAINING PROGRAM
Payer: MEDICARE

## 2024-11-13 VITALS
BODY MASS INDEX: 33.49 KG/M2 | HEIGHT: 76 IN | WEIGHT: 275 LBS | DIASTOLIC BLOOD PRESSURE: 87 MMHG | SYSTOLIC BLOOD PRESSURE: 137 MMHG | RESPIRATION RATE: 16 BRPM | OXYGEN SATURATION: 95 % | TEMPERATURE: 98 F | HEART RATE: 93 BPM

## 2024-11-13 DIAGNOSIS — R31.9 HEMATURIA, UNSPECIFIED TYPE: ICD-10-CM

## 2024-11-13 DIAGNOSIS — N30.01 ACUTE CYSTITIS WITH HEMATURIA: Primary | ICD-10-CM

## 2024-11-13 LAB
ALBUMIN SERPL-MCNC: 3.5 G/DL (ref 3.4–4.8)
ALBUMIN/GLOB SERPL: 1.2 RATIO (ref 1.1–2)
ALP SERPL-CCNC: 192 UNIT/L (ref 40–150)
ALT SERPL-CCNC: 24 UNIT/L (ref 0–55)
ANION GAP SERPL CALC-SCNC: 9 MEQ/L
AST SERPL-CCNC: 41 UNIT/L (ref 5–34)
BACTERIA #/AREA URNS AUTO: ABNORMAL /HPF
BASOPHILS # BLD AUTO: 0.03 X10(3)/MCL
BASOPHILS NFR BLD AUTO: 0.6 %
BILIRUB SERPL-MCNC: 0.8 MG/DL
BILIRUB UR QL STRIP.AUTO: NEGATIVE
BUN SERPL-MCNC: 18.6 MG/DL (ref 8.4–25.7)
CALCIUM SERPL-MCNC: 9.3 MG/DL (ref 8.8–10)
CHLORIDE SERPL-SCNC: 102 MMOL/L (ref 98–107)
CLARITY UR: ABNORMAL
CO2 SERPL-SCNC: 28 MMOL/L (ref 23–31)
COLOR UR AUTO: ABNORMAL
CREAT SERPL-MCNC: 0.8 MG/DL (ref 0.72–1.25)
CREAT/UREA NIT SERPL: 23
EOSINOPHIL # BLD AUTO: 0.08 X10(3)/MCL (ref 0–0.9)
EOSINOPHIL NFR BLD AUTO: 1.6 %
ERYTHROCYTE [DISTWIDTH] IN BLOOD BY AUTOMATED COUNT: 15.9 % (ref 11.5–17)
GFR SERPLBLD CREATININE-BSD FMLA CKD-EPI: >60 ML/MIN/1.73/M2
GLOBULIN SER-MCNC: 3 GM/DL (ref 2.4–3.5)
GLUCOSE SERPL-MCNC: 97 MG/DL (ref 82–115)
GLUCOSE UR QL STRIP: NORMAL
HCT VFR BLD AUTO: 34.8 % (ref 42–52)
HGB BLD-MCNC: 11.2 G/DL (ref 14–18)
HGB UR QL STRIP: ABNORMAL
IMM GRANULOCYTES # BLD AUTO: 0.01 X10(3)/MCL (ref 0–0.04)
IMM GRANULOCYTES NFR BLD AUTO: 0.2 %
KETONES UR QL STRIP: NEGATIVE
LEUKOCYTE ESTERASE UR QL STRIP: 25
LYMPHOCYTES # BLD AUTO: 1.13 X10(3)/MCL (ref 0.6–4.6)
LYMPHOCYTES NFR BLD AUTO: 22.1 %
MCH RBC QN AUTO: 28.4 PG (ref 27–31)
MCHC RBC AUTO-ENTMCNC: 32.2 G/DL (ref 33–36)
MCV RBC AUTO: 88.1 FL (ref 80–94)
MONOCYTES # BLD AUTO: 0.53 X10(3)/MCL (ref 0.1–1.3)
MONOCYTES NFR BLD AUTO: 10.4 %
NEUTROPHILS # BLD AUTO: 3.33 X10(3)/MCL (ref 2.1–9.2)
NEUTROPHILS NFR BLD AUTO: 65.1 %
NITRITE UR QL STRIP: NEGATIVE
NRBC BLD AUTO-RTO: 0 %
PH UR STRIP: 5.5 [PH]
PLATELET # BLD AUTO: 152 X10(3)/MCL (ref 130–400)
PMV BLD AUTO: 9.7 FL (ref 7.4–10.4)
POTASSIUM SERPL-SCNC: 4.2 MMOL/L (ref 3.5–5.1)
PROT SERPL-MCNC: 6.5 GM/DL (ref 5.8–7.6)
PROT UR QL STRIP: ABNORMAL
RBC # BLD AUTO: 3.95 X10(6)/MCL (ref 4.7–6.1)
RBC #/AREA URNS AUTO: >100 /HPF
SODIUM SERPL-SCNC: 139 MMOL/L (ref 136–145)
SP GR UR STRIP.AUTO: 1.02 (ref 1–1.03)
SQUAMOUS #/AREA URNS LPF: ABNORMAL /HPF
UROBILINOGEN UR STRIP-ACNC: NORMAL
WBC # BLD AUTO: 5.11 X10(3)/MCL (ref 4.5–11.5)
WBC #/AREA URNS AUTO: >100 /HPF
YEAST BUDDING URNS QL: ABNORMAL /HPF

## 2024-11-13 PROCEDURE — 81001 URINALYSIS AUTO W/SCOPE: CPT | Performed by: PHYSICIAN ASSISTANT

## 2024-11-13 PROCEDURE — 99283 EMERGENCY DEPT VISIT LOW MDM: CPT

## 2024-11-13 PROCEDURE — 87086 URINE CULTURE/COLONY COUNT: CPT | Performed by: PHYSICIAN ASSISTANT

## 2024-11-13 PROCEDURE — 80053 COMPREHEN METABOLIC PANEL: CPT | Performed by: PHYSICIAN ASSISTANT

## 2024-11-13 PROCEDURE — 25000003 PHARM REV CODE 250: Performed by: STUDENT IN AN ORGANIZED HEALTH CARE EDUCATION/TRAINING PROGRAM

## 2024-11-13 PROCEDURE — 85025 COMPLETE CBC W/AUTO DIFF WBC: CPT | Performed by: PHYSICIAN ASSISTANT

## 2024-11-13 RX ORDER — FLUCONAZOLE 200 MG/1
200 TABLET ORAL
Status: COMPLETED | OUTPATIENT
Start: 2024-11-13 | End: 2024-11-13

## 2024-11-13 RX ORDER — CEFDINIR 300 MG/1
300 CAPSULE ORAL 2 TIMES DAILY
Qty: 20 CAPSULE | Refills: 0 | Status: SHIPPED | OUTPATIENT
Start: 2024-11-13 | End: 2024-11-23

## 2024-11-13 RX ADMIN — FLUCONAZOLE 200 MG: 200 TABLET ORAL at 08:11

## 2024-11-14 NOTE — FIRST PROVIDER EVALUATION
"Medical screening examination initiated.  I have conducted a focused provider triage encounter, findings are as follows:    Brief history of present illness:  84-year-old male presents to ED for evaluation of hematuria.    Vitals:    11/13/24 1858   BP: (!) 155/80   BP Location: Left arm   Pulse: 98   Resp: 18   Temp: 98.1 °F (36.7 °C)   TempSrc: Oral   SpO2: 96%   Weight: 124.7 kg (275 lb)   Height: 6' 4" (1.93 m)       Pertinent physical exam:  Patient is awake and alert and oriented.       Brief workup plan:  UA, labs     Preliminary workup initiated; this workup will be continued and followed by the physician or advanced practice provider that is assigned to the patient when roomed.  "

## 2024-11-14 NOTE — DISCHARGE INSTRUCTIONS
Thanks for letting use take care of you today! It is our goal to give you courteous care and to keep you comfortable and informed. If you have any questions before you leave I will be happy to try and answer them.     Advice after your visit:  Your visit in the emergency department is NOT definitive care - please follow-up with your primary care doctor and/or specialist within 1-2 days. If you do not have a primary care physician call [phone] to schedule an appointment. Please return if you have any worsening in your condition or if you have any other concerns.    Return to the emergency department if any worsening symptoms including fever, chest pain, difficulty breathing, weakness, numbness, tingling, nausea, vomiting, inability to eat, drink or take your medication, or any other new symptoms or concerns arise.      Please signup for MyChart as noted below in your paperwork to review all labwork, imaging results, and any other incidental findings from today's visit.     If you had radiology exams like an XRAY or CT in the emergency Department the interpreation on them may be preliminary - there may be less time sensitive findings on the reports please obtain these reports within 24 hours from the hospital or by using your out on your mobile phone to access records.  Bring these to your primary care doctor and/or specialist for further review of incidental findings.    Please review any LAB WORK from your visit today with your primary care physician.    If you were prescribed OPIATE PAIN MEDICATION - please understand of these medications can be addictive, you may fill less of the prescription was written for, you do not have to take the full prescription.  You may discard what you do not use.  Please seek help if you feel you are having problems with addiction.  Do not drive or operate heavy machinery if you are taking sedating medications.  Do not mix these medications with alcohol.      If you had a SPLINT  placed in the emergency department if you have severe pain numbness tingling or discoloration of year digits please remove the splint and return to the emergency department for further evaluation as this may represent a sign of compromise to the nerves or blood vessels due to swelling.    If you had SUTURES in the emergency department please have them removed in the prescribed time frame typically within 7-14 days.  You may shower but please do not bathe or swim.  Keep the wounds clean and dry and covered with a clean dressing.  Please return if he have any signs of infection like redness or drainage or pain at the suture site.    Please take the full course of  any ANTIBIOTICS you were prescribed - incomplete courses of antibiotics can cause resistance to antibiotics in the future which will make it difficult to treat any infections you may have.

## 2024-11-14 NOTE — ED PROVIDER NOTES
Encounter Date: 11/13/2024    SCRIBE #1 NOTE: I, Farhat Deshpande, am scribing for, and in the presence of,  Minor Woodruff IV, MD. I have scribed the following portions of the note - Other sections scribed: HPI,ROS,PE.       History     Chief Complaint   Patient presents with    Hematuria     Reports hematuria starting today. Noted dark red with clots no fever. Lower abdominal discomfort. Hx prostate CA in remission urologist dr. tejada     85 y/o male with PMHx of A-fib, CHF, CAD, HLD, and prostate cancer (in remission) presents to ED c/o hematuria onset today. Pt reports when he was urinating, it was dark red blood with clots. He denies any fever, difficulty urinating, dysuria, back pain, or abdominal pain.     Urology:  Edgar Tejada MD    The history is provided by the patient. No  was used.     Review of patient's allergies indicates:  No Known Allergies  Past Medical History:   Diagnosis Date    A-fib     CHF (congestive heart failure)     Coronary artery disease     Hyperlipidemia     Osteoarthritis     Prostate cancer     Sick sinus syndrome      Past Surgical History:   Procedure Laterality Date    CHOLECYSTECTOMY      INSERT / REPLACE / REMOVE PACEMAKER      TONSILLECTOMY      TOTAL SHOULDER ARTHROPLASTY Left     TRIPLE BYPASS       Family History   Problem Relation Name Age of Onset    Heart attack Mother      Heart attack Father      Cancer Brother       Social History     Tobacco Use    Smoking status: Former     Types: Cigarettes    Smokeless tobacco: Never   Substance Use Topics    Alcohol use: Not Currently    Drug use: Never     Review of Systems   Constitutional:  Negative for chills and fever.   HENT:  Negative for congestion, rhinorrhea and sore throat.    Eyes:  Negative for visual disturbance.   Respiratory:  Negative for cough and shortness of breath.    Cardiovascular:  Negative for chest pain.   Gastrointestinal:  Negative for abdominal pain, nausea and vomiting.    Genitourinary:  Positive for hematuria. Negative for difficulty urinating and dysuria.   Musculoskeletal:  Negative for back pain and joint swelling.   Skin:  Negative for rash.   Neurological:  Negative for weakness.   Psychiatric/Behavioral:  Negative for confusion.    All other systems reviewed and are negative.      Physical Exam     Initial Vitals [11/13/24 1858]   BP Pulse Resp Temp SpO2   (!) 155/80 98 18 98.1 °F (36.7 °C) 96 %      MAP       --         Physical Exam    Nursing note and vitals reviewed.  Constitutional: He is not diaphoretic. No distress.   HENT:   Head: Normocephalic and atraumatic.   Neck: Neck supple.   Normal range of motion.  Cardiovascular:  Normal rate, regular rhythm and normal heart sounds.           Pulmonary/Chest: Breath sounds normal. No respiratory distress.   Abdominal: Abdomen is soft. He exhibits no distension. There is no abdominal tenderness.   Musculoskeletal:         General: No edema.      Cervical back: Normal range of motion and neck supple.     Neurological: He is alert and oriented to person, place, and time. He has normal strength. No cranial nerve deficit or sensory deficit.   Skin: Skin is warm. Capillary refill takes less than 2 seconds.   Psychiatric: He has a normal mood and affect.         ED Course   Procedures  Labs Reviewed   URINALYSIS, REFLEX TO URINE CULTURE - Abnormal       Result Value    Color, UA Light-Orange (*)     Appearance, UA Turbid (*)     Specific Gravity, UA 1.017      pH, UA 5.5      Protein, UA 1+ (*)     Glucose, UA Normal      Ketones, UA Negative      Blood, UA 3+ (*)     Bilirubin, UA Negative      Urobilinogen, UA Normal      Nitrites, UA Negative      Leukocyte Esterase, UA 25 (*)     RBC, UA >100 (*)     WBC, UA >100 (*)     Bacteria, UA None Seen      Budding Yeast, UA Few (*)     Squamous Epithelial Cells, UA Trace     COMPREHENSIVE METABOLIC PANEL - Abnormal    Sodium 139      Potassium 4.2      Chloride 102      CO2 28       Glucose 97      Blood Urea Nitrogen 18.6      Creatinine 0.80      Calcium 9.3      Protein Total 6.5      Albumin 3.5      Globulin 3.0      Albumin/Globulin Ratio 1.2      Bilirubin Total 0.8       (*)     ALT 24      AST 41 (*)     eGFR >60      Anion Gap 9.0      BUN/Creatinine Ratio 23     CBC WITH DIFFERENTIAL - Abnormal    WBC 5.11      RBC 3.95 (*)     Hgb 11.2 (*)     Hct 34.8 (*)     MCV 88.1      MCH 28.4      MCHC 32.2 (*)     RDW 15.9      Platelet 152      MPV 9.7      Neut % 65.1      Lymph % 22.1      Mono % 10.4      Eos % 1.6      Basophil % 0.6      Lymph # 1.13      Neut # 3.33      Mono # 0.53      Eos # 0.08      Baso # 0.03      IG# 0.01      IG% 0.2      NRBC% 0.0     CULTURE, URINE   CBC W/ AUTO DIFFERENTIAL    Narrative:     The following orders were created for panel order CBC auto differential.  Procedure                               Abnormality         Status                     ---------                               -----------         ------                     CBC with Differential[[id number]]       Abnormal            Final result                 Please view results for these tests on the individual orders.          Imaging Results    None          Medications   fluconazole tablet 200 mg (200 mg Oral Given 11/13/24 2033)     Medical Decision Making  85 yo presenting with dysuria, hematuria  Exam as above  UA possible infection  Will treat with abx, refer to urology for further workup of malignancy, etc     The differential diagnosis includes, but is not limited to:  Uti, kidney stone, carmelo, infection, bladder cancer      Problems Addressed:  Acute cystitis with hematuria: acute illness or injury that poses a threat to life or bodily functions  Hematuria, unspecified type: acute illness or injury that poses a threat to life or bodily functions    Amount and/or Complexity of Data Reviewed  Labs: ordered.    Risk  OTC drugs.  Prescription drug management.            Scribe  Attestation:   Scribe #1: I performed the above scribed service and the documentation accurately describes the services I performed. I attest to the accuracy of the note.    Attending Attestation:           Physician Attestation for Scribe:  Physician Attestation Statement for Scribe #1: I, Minor Woodruff IV, MD, reviewed documentation, as scribed by Farhat Deshpande in my presence, and it is both accurate and complete.             ED Course as of 11/13/24 2204 Wed Nov 13, 2024 2021 UA possible infection, some yeast. Will give a diflucan here, treat as infectious, refer to urology  [AC]      ED Course User Index  [AC] Minor Woodruff IV, MD                           Clinical Impression:  Final diagnoses:  [R31.9] Hematuria, unspecified type  [N30.01] Acute cystitis with hematuria (Primary)          ED Disposition Condition    Discharge Stable          ED Prescriptions       Medication Sig Dispense Start Date End Date Auth. Provider    cefdinir (OMNICEF) 300 MG capsule Take 1 capsule (300 mg total) by mouth 2 (two) times daily. for 10 days 20 capsule 11/13/2024 11/23/2024 Minor Woodruff IV, MD          Follow-up Information       Follow up With Specialties Details Why Contact Info    [facility] - Emergency Dept Emergency Medicine Go to  If symptoms worsen [address] [facility] [id number] [phone]    Primary care physician  Schedule an appointment as soon as possible for a visit   Follow up with you primary care physician.   If you do not have a primary care physician call [phone] to schedule an appointment.    Jesus Hernandez MD Family Medicine Schedule an appointment as soon as possible for a visit   [address] [facility] [address]  [phone]      Edgar Tejada MD Urology Schedule an appointment as soon as possible for a visit   [address] [facility] [address]  [facility] [address]  [phone]               Minor Woodruff IV, MD  11/13/24 2204

## 2024-11-15 LAB — BACTERIA UR CULT: NO GROWTH

## 2024-11-22 ENCOUNTER — HOSPITAL ENCOUNTER (OUTPATIENT)
Dept: WOUND CARE | Facility: HOSPITAL | Age: 84
Discharge: HOME | End: 2024-11-22
Attending: FAMILY MEDICINE
Payer: MEDICARE

## 2024-11-22 VITALS
DIASTOLIC BLOOD PRESSURE: 64 MMHG | HEART RATE: 107 BPM | SYSTOLIC BLOOD PRESSURE: 133 MMHG | HEIGHT: 76 IN | BODY MASS INDEX: 33.48 KG/M2 | RESPIRATION RATE: 18 BRPM | WEIGHT: 274.94 LBS

## 2024-11-22 DIAGNOSIS — I87.2 CHRONIC VENOUS INSUFFICIENCY: Primary | ICD-10-CM

## 2024-11-22 PROCEDURE — 27000999 HC MEDICAL RECORD PHOTO DOCUMENTATION

## 2024-11-22 PROCEDURE — 99213 OFFICE O/P EST LOW 20 MIN: CPT

## 2024-11-22 NOTE — PROGRESS NOTES
[facility]: None  Smoker   [] Yes   [x] No   Diabetic  [] Yes  [x] No   Vascular Surgeon: Dr. Carter  Vascular procedures [] Yes [x] No   If so, when and what was done?  Recent Ultrasounds [x] Yes [] No   If so, when were they done? (7/29/24 - venous/arterial)  Compression Pumps  [] Yes [x] No       Right ankle - 26.2cm     Right calf - 45.4 cm     Left ankle - 24.4 cm     Left calf - 39.6 cm  Doppler done in office? [x] Yes [] No   Date: 7/10/24     Right dorsalis: monophasic     Right posterior: biphasic     Left dorsalis: biphasic     Left posterior: abnormal  Is the patient eligible for a graft, and/or currently grafting? [] Yes [x] No    If so, which one/size?    NOTES:  Patient was recently in ED for hematuria, he has not been able to get in touch with his urologist yet. His wounds are healed today   Subjective:       Patient ID: Piter Farrell is a 84 y.o. male.    Chief Complaint: Venous Ulcer ((Left lower leg))    November 22nd, 2024:  84-year-old white male, who is here for follow up of the left lower leg venous stasis ulcers, which have now healed.  He was using Unna boot for the past few weeks.  The right leg reveals trace edema, with no ulcers.  The left leg now has trace edema also.  His daughter will help him put on compression stockings in the future.    11/12/4 - The patient was seen today for a nurse visit only for an Unna Boot change for the left venous stasis ulcer.  No issues or concerns were brought to this providers attention during this visit.     November 8th, 2024:  84-year-old white male, who is here for follow up of his chronic venous stasis.  He also has very small blisters on the left lower leg.  His left leg is more edematous than the right leg.  He has been trying to use compression stockings, but he has difficulty putting them on and taking them off.  I afraid that the left leg is going to get worse, without good compression.    October 25, 2024:  84-year-old white male, who is here  for follow up of his chronic venous insufficiency, and development of a new lower leg anterior venous stasis ulcer.  There is also a chronic small eschar on the left calf.  He has been using Tubigrip only.  He only has trace edema in the lower legs.  His right leg reveals no ulcers.    October 18, 2024:  84-year-old white male, who is here for follow up of his chronic bilateral venous insufficiency.  His venous stasis ulcers have now healed.  He will be using his compression stockings at home.  The edema has resolved.    10/15/24 - The patient was seen today for a nurse visit only for an Unna Boot change for the left venous stasis ulcer.  No issues or concerns were brought to this providers attention during this visit.     October 11, 2024:    84-year-old white male, who is here for follow up of chronic venous insufficiency with 2 very small left venous stasis ulcers.  He has been using Unna boots for a few weeks, only on the left leg.  He uses a Tubigrip on the right leg.    10/7/24 - Patient here for nurse visit - new blister has developed near original two wounds. Resumed use of original zinc unna boot.    10/4/24 - Mr. Farrell returns today for followup on venous ulcers at his left lower extremity.  The initial wound is considerably smaller today, however, since his last visit has had the development a 2nd larger blister.  A mechanical debridement was performed and we will re-apply the Unna boot today.    10/1/24 - The patient was seen today for a nurse visit only for an Unna Boot change for the left venous stasis ulcer.  No issues or concerns were brought to this providers attention during this visit.     September 27, 2024:  84-year-old white male, is here for follow up of the left venous stasis ulcer, chronic venous insufficiency.  He has been using a Tubigrip on the right leg, and Unna boot on the left leg.  The venous stasis ulcer on the left calf is measuring the same as last week, but it is very  clean.    9/24/24 - Mr. Farrell is seen today for a nurse visit only for an Unna Boot change for the left venous stasis ulcer.  No issues or concerns were brought to this providers attention during this visit    September 20, 2024:  84-year-old white male, who is here for follow up of the left calf venous stasis ulcer.  It is measuring smaller today.  He has been using an Unna boot.  The maceration around the edges is much improved.    9/17/24 - Mr. Farrell is seen today for a nurse visit only for an Unna Boot change for the left venous stasis ulcer.  No issues or concerns were brought to this providers attention during this visit.     September 13, 2024:  84-year-old white male, who is here for follow up of the left calf venous stasis ulcer.  The measurements are improving, but there still maceration around the edges.  He has been using Polymem.  The edema has greatly improved.  He is also using an Unna boot on the left leg at this time.    9/10/24 - Mr. Farrell is seen today for a nurse visit only for an Unna Boot change for the left venous stasis ulcer.  No issues or concerns were brought to this providers attention during this visit.     September 6, 2024:  84-year-old white male, who is here for follow up of his left venous stasis ulcer, chronic venous insufficiency.  The venous stasis ulcer is improving, with regard to measurements.  He has been using Polymem.  There still some maceration around the edges.  He is using boot on the left leg twice a week.    9/3/24 - The patient is seen today for a nurse visit only for an Unna Boot change for the left venous stasis ulcer.  No issues or concerns were brought to this providers attention during this visit.     8/30/24 - The patient followup on the venous stasis ulcers at his left lower extremity.  Today, that wound was quite macerated.  He does attempt to elevate as much as possible, however, his son is in the nursing facility and so he does been a lot of time there with  the legs in a dependent position.  Today, we will change from silver alginate to Polymem with the hopes that that product may hold the moisture a little better.  He will continue with the Unna boots and twice weekly visits as well.  Of note, compression stockings were ordered on Tuesday 8/27/24, he has not received them yet, we will check on this order. He has blood work and a follow up with Dr. Carter (cardio) in [facility]    8/27/24 - Mr. Farrell is seen today for a nurse visit only for an Unna Boot change for the left venous stasis ulcer.  No issues or concerns were brought to this providers attention during this visit.     August 23, 2024:  84-year-old white male, who is here for follow up of his chronic venous insufficiency, left calf venous stasis ulcer.  He has no open wounds on the right leg.  He will no longer need an Unna boot on the right leg.  The wound on the left calf is improving each week.    8/21/24 - Patient is seen today for a nurse visit only for Unna boot change.  No issues or complaints were brought to this provider's attention during this visit.     August 16, 2024:  84-year-old white male, who is here for a nurse visit only, to have his Unna boot changed.  There are no new issues.  He has venous stasis ulcers.    August 13, 2024:  84-year-old white male, who is here for follow up of his chronic venous insufficiency, left lower leg venous stasis ulcer.  These wounds are all improving.  The wound on his right lower leg has just about healed.  The edema is much improved with Unna boots.  He does have compression stockings at home, for future needs.      August 9, 2024:  84-year-old white male, who is here for a nurse visit only, to have his dressings changed.  There are no new issues.    August 7, 2024:    84-year-old white male, she of edema in his legs, and some venous stasis ulcers.  The recent venous ultrasound showed good flow in all of his major veins.  There was no DVT.  His CHF appears to  be well controlled.  He will an attempt at compression of both legs.  The venous stasis ulcers in his legs are somewhat stalled at this time.  There is a left lower medial venous stasis ulcer, with slough present.  It is slightly painful today.    7/31/24 - Mr. Farrell returns today for the bilateral lower extremity venous ulcers.  He did complete his cardiovascular U/S on 7/29/24.  Both his arterial and venous studies were normal.  He also had a wound culture done on 7/25/24 which shows 100% Staph aureus.  He was prescribed Doxycycline today x 10 days.  We will also be authorizing Dalvance infusion for him in treatment of Staph aureus.  We would like to begin compression with Unna Boots, however, the patient would first like to clear that with his cardiologist.  He does have an adequate EF.  He does have an appt with Dr. Carter (cardio) this afternoon at 2:30 to have a halter monitor placed due to his a-fib. He was given a copy of his vascular studies to review at that time and will discus compression. Today the wounds on the left leg were selectively debrided.  Santyl has been ordered and we discussed the cost.  If he is unable to obtain he will continue with Mesalt.     7/25/24 - The patient returns today for f/up on his bilateral lower extremity venous ulcers.  The wounds have all improved since his last visit on 7/17/24, however there is some slight erythema at the left lower extremity.  A culture was obtained of the left leg.  He is scheduled for bilateral venous/arterial US on 7/29/24. However, he is concerned about using compression and wound like to discuss it with his cardiologist, Dr. Carter.   Once the U/S are done, if there is any venous reflux noted these will be sent to Dr. Carter for evaluation and consultation with the patient prior to compression.     7/17/24 - Mr. Farrell is here today for followup on his bilateral lower extremity venous ulcers as well as a small wound to the right 2nd toe.  The right  2nd toe was assessed and remains stable.  The scattered wounds at the lower extremities were selectively debrided.  They are beginning to make progress and there are couple of areas were epithelial tissue is beginning to my great from the edges.  He has completed his p.o. Cefzil.  We are anxious to begin compressing the lower extremities, however, we do not have arterial clearing at this time.  He is scheduled for cardiovascular ultrasounds on 07/29/2024 at 10:00 a.m..  We do have venous ultrasounds on file, however, we do not have arterial.  Once arterial ultrasounds or clear we will begin compressing using Unna boots.    7/10/24 - Mr. Farrell is an 84 year old  male who presents with bilateral lower extremity venous ulcers.  His PCP is Dr. Jesus Hernandez, who referred him to wound clinic.    4/16/24: saw PCP for multiple sores on lower extremities, was given Augmentin 875 mg BID and Bumetanide (no culture was obtained).  4/25/24: saw PCP again for bacterial infection of lower extremities, refilled Augmentin 875 mg BID for 5 days (no culture was obtained)  5/2/24: saw PCP for left leg, was given Silvadine  5/28/24: saw PCP, suggested that he use compression stockings and follow up with his cardiologist (Dr. Carter), he did not do either of these things  7/9/24: saw PCP given Lasix, Cefzil and referred to wound care  He has a history of triple bypass and CHF.   He states that he had venous US done on his legs about 1 month ago with Dr. Carter.  These results were reviewed.  No arterial U/S were performed and the study shows that it was incomplete due to patient resistance.   He is not a diabetic.   He states that he was seen in our clinic about 3 years ago for the same issue.    Today his bilateral lower extremities have multiple open areas that do appear venous in nature.  Because his venous ultrasounds were in conclusive an incomplete we will be ordering full cardiovascular ultrasounds.  He does have 2+ edema  as well as hemosiderin staining.  We would like to compress, however, until we have arterial clearing we are hesitant to do so as this has not been addressed.  We did have a lengthy discussion today regarding his congestive heart failure, the need to compress, the impact that compression may have on his CHF due to moving the fluid from his legs to his chest, as well as the need to verify his arterial status prior to compressing.  He is in agreement with all of this and today we will begin covering the open ulcers and applying a Tubigrip over the Kerlix to hold the dressings in place.  He was instructed that he should alternate short elevation of the legs with dependent hanging of the legs so that blood flow can continue to the feet but at the same time tried to begin offloading some of the legs.  He is ambulatory and therefore would be able to wear an Unna boot wound we are at that point.  He does been the majority of his time at a local skilled nursing facility with his handicapped son.   All 10 toe nails today were quite lengthy and were trimmed for the patient.    Review of Systems   Constitutional: Negative.    Respiratory: Negative.     Cardiovascular: Negative.    Skin:         As documented in the HPI.   All other systems reviewed and are negative.        Objective:        Vitals:    11/22/24 0750   BP: 133/64   Pulse: 107   Resp: 18       Physical Exam  Vitals and nursing note reviewed.   Constitutional:       Appearance: Normal appearance.   Cardiovascular:      Rate and Rhythm: Normal rate.   Pulmonary:      Effort: Pulmonary effort is normal.   Skin:     General: Skin is warm and dry.      Comments: Both of his lower legs reveal trace edema, without venous stasis ulcers today.  He will stop the Unna boot.   Neurological:      Mental Status: He is alert.       [REMOVED]      Wound 10/25/24 0758 Traumatic Left anterior;lower Leg #2 (Removed)   10/25/24 0758 Leg   Present on Original Admission: Y   Primary  Wound Type: Traumatic   Side: Left   Orientation: anterior;lower   Wound Approximate Age at First Assessment (Weeks):    Wound Number: #2   Is this injury device related?:    Incision Type:    Closure Method:    Wound Description (Comments):    Type:    Additional Comments:    Ankle-Brachial Index:    Pulses:    Removal Indication and Assessment:    Wound Outcome: Healed   Removed 11/22/24 0752   Dressing Appearance Intact;Clean;Dry 11/22/24 0750   Drainage Amount None 11/22/24 0750   Appearance Intact;Pink;Dry 11/22/24 0750   Periwound Area Dry;Pink;Intact 11/22/24 0750   Wound Length (cm) 0 cm 11/22/24 0750   Wound Width (cm) 0 cm 11/22/24 0750   Wound Depth (cm) 0 cm 11/22/24 0750   Wound Volume (cm^3) 0 cm^3 11/22/24 0750   Wound Surface Area (cm^2) 0 cm^2 11/22/24 0750   Care Cleansed with:;Wound cleanser 11/22/24 0750       [REMOVED]      Wound 11/08/24 0801 Venous Ulcer Left lower;posterior Leg #1 (Removed)   11/08/24 0801 Leg   Present on Original Admission: Y   Primary Wound Type: Venous ulcer   Side: Left   Orientation: lower;posterior   Wound Approximate Age at First Assessment (Weeks):    Wound Number: #1   Is this injury device related?:    Incision Type:    Closure Method:    Wound Description (Comments):    Type:    Additional Comments:    Ankle-Brachial Index:    Pulses:    Removal Indication and Assessment:    Wound Outcome: Healed   Removed 11/22/24 0751   Dressing Appearance Clean;Intact;Dry 11/22/24 0750   Drainage Amount None 11/22/24 0750   Appearance Intact;Pink;Dry 11/22/24 0750   Periwound Area Dry;Pink;Intact 11/22/24 0750   Wound Length (cm) 0 cm 11/22/24 0750   Wound Width (cm) 0 cm 11/22/24 0750   Wound Depth (cm) 0 cm 11/22/24 0750   Wound Volume (cm^3) 0 cm^3 11/22/24 0750   Wound Surface Area (cm^2) 0 cm^2 11/22/24 0750   Care Cleansed with:;Wound cleanser 11/22/24 0750   Dressing Applied 11/22/24 0750 11/12/24        Left lower anterior leg (pre)                           Left  "lower posterior leg (pre)  (Zinc unna boot, kerlix, coban)    11/22/24        Left lower anterior leg (pre)                           Left lower posterior leg (pre)            Assessment:         ICD-10-CM ICD-9-CM   1. Chronic venous insufficiency  I87.2 459.81               Procedures:   No procedure      [] Yes [] No   I & D performed  [] Yes [] No   Excisional debridement performed  [] Yes [] No   Selective debridement performed           [] Yes [] No   Mechanical debridement performed         [] Yes [] No  Silver nitrate applied                                     [] Yes [] No  Labs ordered this visit                                  [] Yes [] No   Imaging ordered this visit                           [] Yes [] No   Tissue pathology and/or culture taken         MEDICATIONS    Current Outpatient Medications:     cefdinir (OMNICEF) 300 MG capsule, Take 1 capsule (300 mg total) by mouth 2 (two) times daily. for 10 days, Disp: 20 capsule, Rfl: 0    LASIX 40 mg tablet, Take 40 mg by mouth., Disp: , Rfl:     metoprolol succinate (TOPROL-XL) 25 MG 24 hr tablet, Take 50 mg by mouth 2 (two) times daily., Disp: , Rfl:     PEPCID 20 mg tablet, Take 20 mg by mouth 2 (two) times daily., Disp: , Rfl:     potassium chloride SA (K-DUR,KLOR-CON) 20 MEQ tablet, Take 20 mEq by mouth., Disp: , Rfl:     rosuvastatin (CRESTOR) 20 MG tablet, Take 20 mg by mouth every evening., Disp: , Rfl:     tamsulosin (FLOMAX) 0.4 mg Cap, Take 0.4 mg by mouth., Disp: , Rfl:     XARELTO 20 mg Tab, Take 20 mg by mouth., Disp: , Rfl:     mupirocin (BACTROBAN) 2 % ointment, , Disp: , Rfl:     SSD 1 % cream, , Disp: , Rfl:  Review of patient's allergies indicates:  No Known Allergies    DIAGNOSTICS    Labs/Scans/Micro:    CBC:   Lab Results   Component Value Date    WBC 5.11 11/13/2024    HGB 11.2 (L) 11/13/2024    HCT 34.8 (L) 11/13/2024    MCV 88.1 11/13/2024     11/13/2024     Sedimentation rate: No results found for: "SEDRATE"     C-reactive " "protein: No results found for: "CRP"     Chemistry: 3/12/24  Comprehensive Metabolic Panel       Component Ref Range & Units 4 mo ago  (3/12/24)   Sodium 136 - 145 mmol/L 136   Potassium 3.5 - 5.1 mmol/L 4.2   Chloride 98 - 107 mmol/L 100   CO2 23 - 31 mmol/L 27   Glucose 82 - 115 mg/dL 105   Blood Urea Nitrogen 8.4 - 25.7 mg/dL 21.0   Creatinine 0.73 - 1.18 mg/dL 0.82   Calcium 8.8 - 10.0 mg/dL 9.6   Protein Total 5.8 - 7.6 gm/dL 7.4   Albumin 3.4 - 4.8 g/dL 3.8   Globulin 2.4 - 3.5 gm/dL 3.6 High    Albumin/Globulin Ratio 1.1 - 2.0 ratio 1.1   Bilirubin Total <=1.5 mg/dL 0.5   ALP 40 - 150 unit/L 127   ALT 0 - 55 unit/L 13   AST 5 - 34 unit/L 28   eGFR mls/min/1.73/m2 >60   Resulting Agency  [facility]        HBA1C: No components found for: "HBA1C"    Ankle Brachial Index: 7/11/24 -   Right - 1.34  Left - 1.29    Imaging:    Plain film: No results found for this or any previous visit.    MRI: No results found for this or any previous visit.    Bone Scan: No results found for this or any previous visit.    Vascular studies:    7/11/24 -   Arterial:  FINDINGS:  Ultrasound Doppler and color flow imaging were performed on the arteries of the left lower extremity.  A tri phasic flow wave pattern is evident throughout the visualized arteries of the left lower extremity no significant localized atherosclerotic plaque formation or focal stenosis noted to the visualized arteries of the left lower extremity.  The right ankle brachial index is 1.34 and the left is 1.29.  Impression:  1.  No significant localized atherosclerotic plaque formation or focal stenosis noted to the visualized arteries of theleft lower extremity.    Venous:  FINDINGS:  There is normal compression and flow noted in the  common femoral vein, superficial femoral vein, popliteal vein, and  posterior tibial veinbilaterally.  No evidence of deep venous thrombosis is identified. No significant venous reflux is identified.  The left greater saphenous vein has " been surgically harvested.  Impression:  1. No deep venous thrombosis identified to the lower extremities bilaterally.    Microbiology:   7/24/24 - Staph Aureus - Doxy Rx sent        HOME HEALTH AGENCY:  none  WOUND CARE ORDERS:  Left anterior lower leg wound: Keep clean and dry, wear compression socks daily  Right anterior lower leg wound: Keep clean and dry, wear compression socks daily    Returned to the clinic as needed.

## 2024-12-25 ENCOUNTER — HOSPITAL ENCOUNTER (EMERGENCY)
Facility: HOSPITAL | Age: 84
Discharge: HOME | End: 2024-12-25
Attending: FAMILY MEDICINE
Payer: MEDICARE

## 2024-12-25 VITALS
DIASTOLIC BLOOD PRESSURE: 93 MMHG | HEIGHT: 76 IN | TEMPERATURE: 97 F | RESPIRATION RATE: 16 BRPM | BODY MASS INDEX: 33.49 KG/M2 | OXYGEN SATURATION: 94 % | SYSTOLIC BLOOD PRESSURE: 155 MMHG | HEART RATE: 110 BPM | WEIGHT: 275 LBS

## 2024-12-25 DIAGNOSIS — I49.8 ACCELERATED JUNCTIONAL RHYTHM: Primary | ICD-10-CM

## 2024-12-25 DIAGNOSIS — R06.02 SOB (SHORTNESS OF BREATH): ICD-10-CM

## 2024-12-25 DIAGNOSIS — R79.89 ELEVATED TROPONIN: ICD-10-CM

## 2024-12-25 DIAGNOSIS — R07.9 CHEST PAIN: ICD-10-CM

## 2024-12-25 LAB
ALBUMIN SERPL-MCNC: 3.2 G/DL (ref 3.4–4.8)
ALBUMIN/GLOB SERPL: 0.9 RATIO (ref 1.1–2)
ALP SERPL-CCNC: 228 UNIT/L (ref 40–150)
ALT SERPL-CCNC: 31 UNIT/L (ref 0–55)
ANION GAP SERPL CALC-SCNC: 11 MEQ/L
AST SERPL-CCNC: 50 UNIT/L (ref 5–34)
BASOPHILS # BLD AUTO: 0.02 X10(3)/MCL
BASOPHILS NFR BLD AUTO: 0.3 %
BILIRUB SERPL-MCNC: 0.9 MG/DL
BNP BLD-MCNC: 229.6 PG/ML
BUN SERPL-MCNC: 20 MG/DL (ref 8.4–25.7)
CALCIUM SERPL-MCNC: 9.4 MG/DL (ref 8.8–10)
CHLORIDE SERPL-SCNC: 102 MMOL/L (ref 98–107)
CK SERPL-CCNC: 182 U/L (ref 30–200)
CO2 SERPL-SCNC: 26 MMOL/L (ref 23–31)
CREAT SERPL-MCNC: 0.79 MG/DL (ref 0.72–1.25)
CREAT/UREA NIT SERPL: 25
EOSINOPHIL # BLD AUTO: 0.03 X10(3)/MCL (ref 0–0.9)
EOSINOPHIL NFR BLD AUTO: 0.5 %
ERYTHROCYTE [DISTWIDTH] IN BLOOD BY AUTOMATED COUNT: 15.7 % (ref 11.5–17)
FLUAV AG UPPER RESP QL IA.RAPID: NOT DETECTED
FLUBV AG UPPER RESP QL IA.RAPID: NOT DETECTED
GFR SERPLBLD CREATININE-BSD FMLA CKD-EPI: >60 ML/MIN/1.73/M2
GLOBULIN SER-MCNC: 3.6 GM/DL (ref 2.4–3.5)
GLUCOSE SERPL-MCNC: 113 MG/DL (ref 82–115)
HCT VFR BLD AUTO: 36.7 % (ref 42–52)
HGB BLD-MCNC: 11.1 G/DL (ref 14–18)
IMM GRANULOCYTES # BLD AUTO: 0.04 X10(3)/MCL (ref 0–0.04)
IMM GRANULOCYTES NFR BLD AUTO: 0.6 %
LYMPHOCYTES # BLD AUTO: 0.34 X10(3)/MCL (ref 0.6–4.6)
LYMPHOCYTES NFR BLD AUTO: 5.4 %
MCH RBC QN AUTO: 28.2 PG (ref 27–31)
MCHC RBC AUTO-ENTMCNC: 30.2 G/DL (ref 33–36)
MCV RBC AUTO: 93.4 FL (ref 80–94)
MONOCYTES # BLD AUTO: 0.05 X10(3)/MCL (ref 0.1–1.3)
MONOCYTES NFR BLD AUTO: 0.8 %
NEUTROPHILS # BLD AUTO: 5.84 X10(3)/MCL (ref 2.1–9.2)
NEUTROPHILS NFR BLD AUTO: 92.4 %
NRBC BLD AUTO-RTO: 0 %
PLATELET # BLD AUTO: 166 X10(3)/MCL (ref 130–400)
PMV BLD AUTO: 10 FL (ref 7.4–10.4)
POTASSIUM SERPL-SCNC: 4.6 MMOL/L (ref 3.5–5.1)
PROT SERPL-MCNC: 6.8 GM/DL (ref 5.8–7.6)
RBC # BLD AUTO: 3.93 X10(6)/MCL (ref 4.7–6.1)
SARS-COV-2 RNA RESP QL NAA+PROBE: NOT DETECTED
SODIUM SERPL-SCNC: 139 MMOL/L (ref 136–145)
TROPONIN I SERPL-MCNC: 0.05 NG/ML (ref 0–0.04)
TROPONIN I SERPL-MCNC: 0.06 NG/ML (ref 0–0.04)
WBC # BLD AUTO: 6.32 X10(3)/MCL (ref 4.5–11.5)

## 2024-12-25 PROCEDURE — 96375 TX/PRO/DX INJ NEW DRUG ADDON: CPT

## 2024-12-25 PROCEDURE — 63600175 PHARM REV CODE 636 W HCPCS: Performed by: FAMILY MEDICINE

## 2024-12-25 PROCEDURE — 85025 COMPLETE CBC W/AUTO DIFF WBC: CPT | Performed by: FAMILY MEDICINE

## 2024-12-25 PROCEDURE — 82550 ASSAY OF CK (CPK): CPT | Performed by: FAMILY MEDICINE

## 2024-12-25 PROCEDURE — 80053 COMPREHEN METABOLIC PANEL: CPT | Performed by: FAMILY MEDICINE

## 2024-12-25 PROCEDURE — 25000003 PHARM REV CODE 250: Performed by: FAMILY MEDICINE

## 2024-12-25 PROCEDURE — 96374 THER/PROPH/DIAG INJ IV PUSH: CPT

## 2024-12-25 PROCEDURE — 0240U COVID/FLU A&B PCR: CPT | Performed by: FAMILY MEDICINE

## 2024-12-25 PROCEDURE — 99285 EMERGENCY DEPT VISIT HI MDM: CPT | Mod: 25

## 2024-12-25 PROCEDURE — 84484 ASSAY OF TROPONIN QUANT: CPT | Performed by: FAMILY MEDICINE

## 2024-12-25 PROCEDURE — 93005 ELECTROCARDIOGRAM TRACING: CPT

## 2024-12-25 PROCEDURE — 83880 ASSAY OF NATRIURETIC PEPTIDE: CPT | Performed by: FAMILY MEDICINE

## 2024-12-25 RX ORDER — METOPROLOL TARTRATE 1 MG/ML
5 INJECTION, SOLUTION INTRAVENOUS
Status: COMPLETED | OUTPATIENT
Start: 2024-12-25 | End: 2024-12-25

## 2024-12-25 RX ORDER — LISINOPRIL 5 MG/1
10 TABLET ORAL
Status: COMPLETED | OUTPATIENT
Start: 2024-12-25 | End: 2024-12-25

## 2024-12-25 RX ORDER — LISINOPRIL 10 MG/1
10 TABLET ORAL DAILY
Qty: 30 TABLET | Refills: 0 | Status: SHIPPED | OUTPATIENT
Start: 2024-12-25 | End: 2025-01-24

## 2024-12-25 RX ORDER — DAPAGLIFLOZIN 10 MG/1
10 TABLET, FILM COATED ORAL DAILY
Qty: 30 TABLET | Refills: 0 | Status: SHIPPED | OUTPATIENT
Start: 2024-12-25 | End: 2025-01-24

## 2024-12-25 RX ORDER — FUROSEMIDE 10 MG/ML
20 INJECTION INTRAMUSCULAR; INTRAVENOUS
Status: COMPLETED | OUTPATIENT
Start: 2024-12-25 | End: 2024-12-25

## 2024-12-25 RX ADMIN — LISINOPRIL 10 MG: 5 TABLET ORAL at 03:12

## 2024-12-25 RX ADMIN — FUROSEMIDE 20 MG: 10 INJECTION, SOLUTION INTRAMUSCULAR; INTRAVENOUS at 12:12

## 2024-12-25 RX ADMIN — METOPROLOL TARTRATE 5 MG: 1 INJECTION, SOLUTION INTRAVENOUS at 11:12

## 2024-12-25 NOTE — TELEMEDICINE CONSULT
[facility]    Cardiology  Consult Note    Patient Name: Piter Farrell  MRN: [id number]  Admission Date: 12/25/2024  Hospital Length of Stay: 0 days  Code Status: No Order   Attending Provider: Emily Cage *   Consulting Provider: Ariel Atkinson MD  Primary Care Physician: Jesus Hernandez MD  Principal Problem:<principal problem not specified>    Patient information was obtained from patient and ER records.     Subjective:     Chief Complaint/Reason for Consult: SOB HTN  mild increase in troponin    HPI:   atient presents with    Shortness of Breath       SOB and wheezing started prior to arrival but has had cough for last 3-4 days.       This patient is an 84-year-old male was visiting a family member at the nursing home across the street.  He states that his head felt very tight suddenly and he was slightly dizzy and then that feeling went to his whole body.  States he became short of breath and that is why he came to the emergency room.  This happened 1 other time before but kind of resolved on its own. Pt hypertensive had accelerated junctional rhythm mild increase in troponin. No CP mild CHF on CXR        Previous Cardiac Diagnostics:   CABG      Past Medical History:   Diagnosis Date    A-fib     CHF (congestive heart failure)     Coronary artery disease     Hyperlipidemia     Osteoarthritis     Prostate cancer     Sick sinus syndrome      Past Surgical History:   Procedure Laterality Date    CHOLECYSTECTOMY      INSERT / REPLACE / REMOVE PACEMAKER      TONSILLECTOMY      TOTAL SHOULDER ARTHROPLASTY Left     TRIPLE BYPASS       Review of patient's allergies indicates:  No Known Allergies  No current facility-administered medications on file prior to encounter.     Current Outpatient Medications on File Prior to Encounter   Medication Sig    apixaban (ELIQUIS) 5 mg Tab Take 5 mg by mouth.    LASIX 40 mg tablet Take 40 mg by mouth.    metoprolol succinate (TOPROL-XL) 25 MG 24 hr  tablet Take 50 mg by mouth 2 (two) times daily.    mupirocin (BACTROBAN) 2 % ointment  (Patient not taking: Reported on 11/22/2024)    PEPCID 20 mg tablet Take 20 mg by mouth 2 (two) times daily.    potassium chloride SA (K-DUR,KLOR-CON) 20 MEQ tablet Take 20 mEq by mouth.    rosuvastatin (CRESTOR) 20 MG tablet Take 20 mg by mouth every evening.    SSD 1 % cream  (Patient not taking: Reported on 11/22/2024)    tamsulosin (FLOMAX) 0.4 mg Cap Take 0.4 mg by mouth.    [DISCONTINUED] XARELTO 20 mg Tab Take 20 mg by mouth.     Family History       Problem Relation (Age of Onset)    Cancer Brother    Heart attack Mother, Father          Tobacco Use    Smoking status: Former     Types: Cigarettes    Smokeless tobacco: Never   Substance and Sexual Activity    Alcohol use: Not Currently    Drug use: Never    Sexual activity: Not on file       Review of Systems   Respiratory:  Positive for shortness of breath. Negative for chest tightness.    Cardiovascular:  Negative for chest pain.   All other systems reviewed and are negative.        Objective:     Vital Signs (Most Recent):  Temp: 96.8 °F (36 °C) (12/25/24 1124)  Pulse: 106 (12/25/24 1231)  Resp: (!) 21 (12/25/24 1231)  BP: 132/74 (12/25/24 1231)  SpO2: (!) 94 % (12/25/24 1231) Vital Signs (24h Range):  Temp:  [96.8 °F (36 °C)] 96.8 °F (36 °C)  Pulse:  [106-115] 106  Resp:  [21-28] 21  SpO2:  [92 %-100 %] 94 %  BP: (132-183)/() 132/74   Weight: 124.7 kg (275 lb)  Body mass index is 33.47 kg/m².  SpO2: (!) 94 %     No intake or output data in the 24 hours ending 12/25/24 1428  Lines/Drains/Airways       Peripheral Intravenous Line  Duration                  Peripheral IV - Single Lumen 12/25/24 1158 22 G Posterior;Right Hand <1 day                  Significant Labs:   Chemistries:   Recent Labs   Lab 12/25/24  1140      K 4.6      CO2 26   BUN 20.0   CREATININE 0.79   CALCIUM 9.4   BILITOT 0.9   ALKPHOS 228*   ALT 31   AST 50*   GLUCOSE 113   TROPONINI  "0.050*        CBC/Anemia Labs: Coags:    Recent Labs   Lab 12/25/24  1140   WBC 6.32   HGB 11.1*   HCT 36.7*      MCV 93.4   RDW 15.7    No results for input(s): "PT", "INR", "APTT" in the last 168 hours.     Significant Imaging:  Imaging Results              X-Ray Chest AP Portable (Final result)  Result time 12/25/24 11:31:25      Final result by Iam Cisneros MD (12/25/24 11:31:25)                   Impression:      Suspect mild congestive changes.      Electronically signed by: Iam Cisneros  Date:    12/25/2024  Time:    11:31               Narrative:    EXAMINATION:  XR CHEST AP PORTABLE    CLINICAL HISTORY:  Chest Pain;    TECHNIQUE:  One view    COMPARISON:  January 2, 2020.    FINDINGS:  Cardiopericardial silhouette appearance is similar.  Sternotomy changes.  Cardiac device electrodes terminate within the right atrium and the right ventricle.  Left lung substantially is of lesser volume with diaphragm elevation and displacement of the hollow viscus within left lower chest.  Mild pulmonary congestion is suspected.  No focally dense consolidation, pleural effusion or pneumothorax.                                    EKG: NSR PACs    Telemetry:  NSR    Physical Exam  Constitutional:       General: He is not in acute distress.     Appearance: Normal appearance. He is obese. He is not ill-appearing.   HENT:      Head: Normocephalic and atraumatic.   Eyes:      Pupils: Pupils are equal, round, and reactive to light.   Cardiovascular:      Rate and Rhythm: Normal rate and regular rhythm.      Pulses: Normal pulses.      Heart sounds: Normal heart sounds.   Pulmonary:      Effort: Pulmonary effort is normal.      Breath sounds: Normal breath sounds.   Abdominal:      General: Abdomen is flat.   Musculoskeletal:      Cervical back: Normal range of motion and neck supple.   Neurological:      General: No focal deficit present.      Mental Status: He is alert and oriented to person, place, and time. "   Psychiatric:         Mood and Affect: Mood normal.         Thought Content: Thought content normal.           Home Medications:   No current facility-administered medications on file prior to encounter.     Current Outpatient Medications on File Prior to Encounter   Medication Sig Dispense Refill    apixaban (ELIQUIS) 5 mg Tab Take 5 mg by mouth.      LASIX 40 mg tablet Take 40 mg by mouth.      metoprolol succinate (TOPROL-XL) 25 MG 24 hr tablet Take 50 mg by mouth 2 (two) times daily.      mupirocin (BACTROBAN) 2 % ointment  (Patient not taking: Reported on 11/22/2024)      PEPCID 20 mg tablet Take 20 mg by mouth 2 (two) times daily.      potassium chloride SA (K-DUR,KLOR-CON) 20 MEQ tablet Take 20 mEq by mouth.      rosuvastatin (CRESTOR) 20 MG tablet Take 20 mg by mouth every evening.      SSD 1 % cream  (Patient not taking: Reported on 11/22/2024)      tamsulosin (FLOMAX) 0.4 mg Cap Take 0.4 mg by mouth.      [DISCONTINUED] XARELTO 20 mg Tab Take 20 mg by mouth.       Current Schedule Inpatient Medications:    Continuous Infusions:    Assessment:   Pt with hypertension mild increase in trop SOB closed grafts to cir and RCA patent LIMA to LAD  EF 40%-50%      Plan:     Rec lisinopril 10 mg Farxiga  10 mg daily  Trop flat   Given dose of Lasix breathing better  OK for D/C followup with primary cardiologist                               A minimum of two characteristics is recommended for diagnosis of either severe or non-severe malnutrition.    Thank you for your consult.     Ariel Atkinson MD  Cardiology  [facility]

## 2024-12-25 NOTE — ED PROVIDER NOTES
Encounter Date: 12/25/2024       History     Chief Complaint   Patient presents with    Shortness of Breath     SOB and wheezing started prior to arrival but has had cough for last 3-4 days.      This patient is an 84-year-old male was visiting a family member at the nursing home across the street.  He states that his head felt very tight suddenly and he was slightly dizzy and then that feeling went to his whole body.  States he became short of breath and that is why he came to the emergency room.  This happened 1 other time before but kind of resolved on its own.    The history is provided by the patient.     Review of patient's allergies indicates:  No Known Allergies  Past Medical History:   Diagnosis Date    A-fib     CHF (congestive heart failure)     Coronary artery disease     Hyperlipidemia     Osteoarthritis     Prostate cancer     Sick sinus syndrome      Past Surgical History:   Procedure Laterality Date    CHOLECYSTECTOMY      INSERT / REPLACE / REMOVE PACEMAKER      TONSILLECTOMY      TOTAL SHOULDER ARTHROPLASTY Left     TRIPLE BYPASS       Family History   Problem Relation Name Age of Onset    Heart attack Mother      Heart attack Father      Cancer Brother       Social History     Tobacco Use    Smoking status: Former     Types: Cigarettes    Smokeless tobacco: Never   Substance Use Topics    Alcohol use: Not Currently    Drug use: Never     Review of Systems   Constitutional:  Positive for fatigue.   HENT: Negative.     Eyes: Negative.    Respiratory:  Positive for shortness of breath.    Cardiovascular: Negative.    Gastrointestinal: Negative.    Endocrine: Negative.    Genitourinary: Negative.    Musculoskeletal: Negative.    Skin: Negative.    Neurological:  Positive for light-headedness.   Psychiatric/Behavioral: Negative.     All other systems reviewed and are negative.      Physical Exam     Initial Vitals [12/25/24 1124]   BP Pulse Resp Temp SpO2   (!) 183/103 (!) 115 (!) 28 96.8 °F (36 °C)  100 %      MAP       --         Physical Exam    Nursing note and vitals reviewed.  Constitutional: He appears well-developed and well-nourished.   HENT:   Head: Normocephalic.   Eyes: Pupils are equal, round, and reactive to light.   Neck:   Normal range of motion.  Cardiovascular:  Regular rhythm.   Tachycardia present.         Pulmonary/Chest: Effort normal. He has decreased breath sounds in the right middle field, the right lower field, the left middle field and the left lower field.   Abdominal: Abdomen is soft. Bowel sounds are normal.   Musculoskeletal:         General: Normal range of motion.      Cervical back: Normal range of motion.     Neurological: He is alert and oriented to person, place, and time.   Skin: Skin is warm and dry.   Psychiatric: He has a normal mood and affect.         ED Course   Critical Care    Date/Time: 12/25/2024 12:42 PM    Performed by: Emily Cage MD  Authorized by: Emily Cage MD  Direct patient critical care time: 20 minutes  Additional history critical care time: 10 minutes  Ordering / reviewing critical care time: 10 minutes  Documentation critical care time: 10 minutes  Consulting other physicians critical care time: 10 minutes  Consult with family critical care time: 10 minutes  Total critical care time (exclusive of procedural time) : 70 minutes  Critical care was necessary to treat or prevent imminent or life-threatening deterioration of the following conditions: cardiac failure.  Critical care was time spent personally by me on the following activities: evaluation of patient's response to treatment, examination of patient, obtaining history from patient or surrogate, ordering and performing treatments and interventions and ordering and review of laboratory studies.        Labs Reviewed   COMPREHENSIVE METABOLIC PANEL - Abnormal       Result Value    Sodium 139      Potassium 4.6      Chloride 102      CO2 26      Glucose 113      Blood Urea  Nitrogen 20.0      Creatinine 0.79      Calcium 9.4      Protein Total 6.8      Albumin 3.2 (*)     Globulin 3.6 (*)     Albumin/Globulin Ratio 0.9 (*)     Bilirubin Total 0.9       (*)     ALT 31      AST 50 (*)     eGFR >60      Anion Gap 11.0      BUN/Creatinine Ratio 25     TROPONIN I - Abnormal    Troponin-I 0.050 (*)    B-TYPE NATRIURETIC PEPTIDE - Abnormal    Natriuretic Peptide 229.6 (*)    CBC WITH DIFFERENTIAL - Abnormal    WBC 6.32      RBC 3.93 (*)     Hgb 11.1 (*)     Hct 36.7 (*)     MCV 93.4      MCH 28.2      MCHC 30.2 (*)     RDW 15.7      Platelet 166      MPV 10.0      Neut % 92.4      Lymph % 5.4      Mono % 0.8      Eos % 0.5      Basophil % 0.3      Lymph # 0.34 (*)     Neut # 5.84      Mono # 0.05 (*)     Eos # 0.03      Baso # 0.02      IG# 0.04      IG% 0.6      NRBC% 0.0     TROPONIN I - Abnormal    Troponin-I 0.059 (*)    CK - Normal    Creatine Kinase 182     COVID/FLU A&B PCR - Normal    Influenza A PCR Not Detected      Influenza B PCR Not Detected      SARS-CoV-2 PCR Not Detected      Narrative:     The Xpert Xpress SARS-CoV-2/FLU/RSV plus is a rapid, multiplexed real-time PCR test intended for the simultaneous qualitative detection and differentiation of SARS-CoV-2, Influenza A, Influenza B, and respiratory syncytial virus (RSV) viral RNA in either nasopharyngeal swab or nasal swab specimens.         CBC W/ AUTO DIFFERENTIAL    Narrative:     The following orders were created for panel order CBC auto differential.  Procedure                               Abnormality         Status                     ---------                               -----------         ------                     CBC with Differential[[id number]]       Abnormal            Final result                 Please view results for these tests on the individual orders.     EKG Readings: (Independently Interpreted)   Initial Reading: No STEMI. Rhythm: Junctional Rhythm. Heart Rate: 115. Ectopy: No Ectopy. ST  Segments: Non-Specific ST Segment Depression. T Waves: Normal. Axis: Normal. Clinical Impression: Accelerated Junctional Rhythm     ECG Results              EKG 12-lead (In process)        Collection Time Result Time QRS Duration [facility] QTC Calculation    12/25/24 13:00:35 12/25/24 13:57:23 84 419                     In process by Interface, Lab In [facility] (12/25/24 13:57:28)                   Narrative:    Test Reason : R06.02,    Vent. Rate :  78 BPM     Atrial Rate :  78 BPM     P-R Int : 160 ms          QRS Dur :  84 ms      QT Int : 368 ms       P-R-T Axes :  47  49 224 degrees    QTcB Int : 419 ms    Sinus rhythm with Premature atrial complexes  ST and T wave abnormality, consider inferior ischemia  ST and T wave abnormality, consider anterolateral ischemia  Abnormal ECG  When compared with ECG of 25-Dec-2024 11:05,  Sinus rhythm has replaced Junctional rhythm  ST no longer elevated in Inferior leads  T wave inversion more evident in Anterior leads    Referred By: AAAREFERRAL SELF           Confirmed By:                                      EKG 12-lead (In process)        Collection Time Result Time QRS Duration [facility] QTC Calculation    12/25/24 11:05:55 12/25/24 13:57:15 78 481                     In process by Interface, Lab In [facility] (12/25/24 13:57:18)                   Narrative:    Test Reason : R07.9,    Vent. Rate : 115 BPM     Atrial Rate : 115 BPM     P-R Int :    ms          QRS Dur :  78 ms      QT Int : 348 ms       P-R-T Axes :     50 227 degrees    QTcB Int : 481 ms    Accelerated Junctional rhythm  ST and T wave abnormality, consider inferior ischemia  ST and T wave abnormality, consider anterolateral ischemia  Abnormal ECG  No previous ECGs available    Referred By: AAAREFERRAL SELF           Confirmed By:                                   Imaging Results              X-Ray Chest AP Portable (Final result)  Result time 12/25/24 11:31:25      Final result by Iam Cisneros MD (12/25/24 11:31:25)                    Impression:      Suspect mild congestive changes.      Electronically signed by: Iam Cisneros  Date:    12/25/2024  Time:    11:31               Narrative:    EXAMINATION:  XR CHEST AP PORTABLE    CLINICAL HISTORY:  Chest Pain;    TECHNIQUE:  One view    COMPARISON:  January 2, 2020.    FINDINGS:  Cardiopericardial silhouette appearance is similar.  Sternotomy changes.  Cardiac device electrodes terminate within the right atrium and the right ventricle.  Left lung substantially is of lesser volume with diaphragm elevation and displacement of the hollow viscus within left lower chest.  Mild pulmonary congestion is suspected.  No focally dense consolidation, pleural effusion or pneumothorax.                                       Medications   metoprolol injection 5 mg (5 mg Intravenous Given 12/25/24 1158)   furosemide injection 20 mg (20 mg Intravenous Given 12/25/24 1235)     Medical Decision Making  This patient is a 84-year-old male who comes in with a strange feeling in his head that went to his body and then he became short of breath.  Upon arrival here to this ER patient has a heart rate of 115  Differential diagnosis: SVT, atrial fibrillation, MI    Amount and/or Complexity of Data Reviewed  Labs: ordered.     Details: Patient's lab work shows that he has a BNP of 229.6, troponin 0.050, CK is normal at 182, CMP shows an AL P of 228 and an AST of 50, hemoglobin of 11.1 and hematocrit of 36.7, flu and COVID are negative.  Second troponin after 2 hours is 0.059, CIS considered unchanged.  Radiology: ordered.     Details: Chest x-ray shows mild congestive changes  Discussion of management or test interpretation with external provider(s): Spoke with  from [facility] and states that patient can follow-up with his own cardiologist.  He states that she put the patient on an ACE inhibitor, lisinopril 10 daily and Farxiga    Risk  Prescription drug management.                                       Clinical Impression:  Final diagnoses:  [R07.9] Chest pain  [I49.8] Accelerated junctional rhythm (Primary)  [R79.89] Elevated troponin  [R06.02] SOB (shortness of breath)          ED Disposition Condition    Discharge Stable          ED Prescriptions       Medication Sig Dispense Start Date End Date Auth. Provider    dapagliflozin propanediol (FARXIGA) 10 mg tablet Take 1 tablet (10 mg total) by mouth once daily. 30 tablet 12/25/2024 1/24/2025 Emily Cage MD    lisinopriL 10 MG tablet Take 1 tablet (10 mg total) by mouth once daily. 30 tablet 12/25/2024 1/24/2025 Emily Cage MD          Follow-up Information       Follow up With Specialties Details Why Contact Info    Jesus Hernandez MD Family Medicine Schedule an appointment as soon as possible for a visit in 2 days  [address] [facility] [place] [address]  [phone]               Emily Cage MD  12/25/24 5565

## 2024-12-26 LAB
OHS QRS DURATION: 78 MS
OHS QRS DURATION: 84 MS
OHS QTC CALCULATION: 419 MS
OHS QTC CALCULATION: 481 MS

## 2025-01-31 ENCOUNTER — LAB VISIT (OUTPATIENT)
Dept: LAB | Facility: HOSPITAL | Age: 85
End: 2025-01-31
Attending: INTERNAL MEDICINE
Payer: MEDICARE

## 2025-01-31 DIAGNOSIS — I51.9 HEART DISEASE, UNSPECIFIED: Primary | ICD-10-CM

## 2025-01-31 LAB
ANION GAP SERPL CALC-SCNC: 9 MEQ/L
BUN SERPL-MCNC: 30 MG/DL (ref 8.4–25.7)
CALCIUM SERPL-MCNC: 9.3 MG/DL (ref 8.8–10)
CHLORIDE SERPL-SCNC: 103 MMOL/L (ref 98–107)
CO2 SERPL-SCNC: 29 MMOL/L (ref 23–31)
CREAT SERPL-MCNC: 0.92 MG/DL (ref 0.72–1.25)
CREAT/UREA NIT SERPL: 33
GFR SERPLBLD CREATININE-BSD FMLA CKD-EPI: >60 ML/MIN/1.73/M2
GLUCOSE SERPL-MCNC: 100 MG/DL (ref 82–115)
POTASSIUM SERPL-SCNC: 4.4 MMOL/L (ref 3.5–5.1)
SODIUM SERPL-SCNC: 141 MMOL/L (ref 136–145)

## 2025-01-31 PROCEDURE — 80048 BASIC METABOLIC PNL TOTAL CA: CPT

## 2025-01-31 PROCEDURE — 36415 COLL VENOUS BLD VENIPUNCTURE: CPT

## 2025-02-14 ENCOUNTER — HOSPITAL ENCOUNTER (OUTPATIENT)
Dept: RADIOLOGY | Facility: HOSPITAL | Age: 85
Discharge: HOME | End: 2025-02-14
Attending: FAMILY MEDICINE
Payer: MEDICARE

## 2025-02-14 DIAGNOSIS — R04.2 HEMOPTYSIS: ICD-10-CM

## 2025-02-14 PROCEDURE — 71046 X-RAY EXAM CHEST 2 VIEWS: CPT | Mod: TC

## 2025-03-13 PROCEDURE — 87077 CULTURE AEROBIC IDENTIFY: CPT | Performed by: NURSE PRACTITIONER

## 2025-03-15 ENCOUNTER — LAB REQUISITION (OUTPATIENT)
Dept: LAB | Facility: HOSPITAL | Age: 85
End: 2025-03-15
Payer: MEDICARE

## 2025-03-15 DIAGNOSIS — N39.0 URINARY TRACT INFECTION, SITE NOT SPECIFIED: ICD-10-CM

## 2025-03-18 LAB — BACTERIA UR CULT: ABNORMAL

## 2025-05-06 ENCOUNTER — APPOINTMENT (OUTPATIENT)
Dept: LAB | Facility: HOSPITAL | Age: 85
End: 2025-05-06
Attending: NURSE PRACTITIONER
Payer: MEDICARE

## 2025-05-06 DIAGNOSIS — R31.0 GROSS HEMATURIA: Primary | ICD-10-CM

## 2025-05-06 LAB
BACTERIA #/AREA URNS AUTO: ABNORMAL /HPF
BILIRUB UR QL STRIP.AUTO: NEGATIVE
CLARITY UR: CLEAR
COLOR UR AUTO: YELLOW
GLUCOSE UR QL STRIP: NEGATIVE
HGB UR QL STRIP: ABNORMAL
KETONES UR QL STRIP: NEGATIVE
LEUKOCYTE ESTERASE UR QL STRIP: ABNORMAL
NITRITE UR QL STRIP: NEGATIVE
PH UR STRIP: 6 [PH]
PROT UR QL STRIP: NEGATIVE
RBC #/AREA URNS AUTO: ABNORMAL /HPF
SP GR UR STRIP.AUTO: 1.01 (ref 1–1.03)
SQUAMOUS #/AREA URNS AUTO: ABNORMAL /HPF
UROBILINOGEN UR STRIP-ACNC: 0.2
WBC #/AREA URNS AUTO: ABNORMAL /HPF

## 2025-05-06 PROCEDURE — 87077 CULTURE AEROBIC IDENTIFY: CPT

## 2025-05-06 PROCEDURE — 81003 URINALYSIS AUTO W/O SCOPE: CPT

## 2025-05-08 LAB — BACTERIA UR CULT: ABNORMAL

## 2025-05-14 ENCOUNTER — LAB VISIT (OUTPATIENT)
Dept: LAB | Facility: HOSPITAL | Age: 85
End: 2025-05-14
Attending: NURSE PRACTITIONER
Payer: MEDICARE

## 2025-05-14 DIAGNOSIS — R31.0 GROSS HEMATURIA: Primary | ICD-10-CM

## 2025-05-14 LAB
ANION GAP SERPL CALC-SCNC: 8 MEQ/L
BUN SERPL-MCNC: 32 MG/DL (ref 8.4–25.7)
CALCIUM SERPL-MCNC: 9.5 MG/DL (ref 8.8–10)
CHLORIDE SERPL-SCNC: 103 MMOL/L (ref 98–107)
CO2 SERPL-SCNC: 27 MMOL/L (ref 23–31)
CREAT SERPL-MCNC: 1.27 MG/DL (ref 0.72–1.25)
CREAT/UREA NIT SERPL: 25
GFR SERPLBLD CREATININE-BSD FMLA CKD-EPI: 55 ML/MIN/1.73/M2
GLUCOSE SERPL-MCNC: 75 MG/DL (ref 82–115)
POTASSIUM SERPL-SCNC: 5.2 MMOL/L (ref 3.5–5.1)
SODIUM SERPL-SCNC: 138 MMOL/L (ref 136–145)

## 2025-05-14 PROCEDURE — 80048 BASIC METABOLIC PNL TOTAL CA: CPT

## 2025-05-14 PROCEDURE — 36415 COLL VENOUS BLD VENIPUNCTURE: CPT
